# Patient Record
Sex: FEMALE | Race: WHITE | NOT HISPANIC OR LATINO
[De-identification: names, ages, dates, MRNs, and addresses within clinical notes are randomized per-mention and may not be internally consistent; named-entity substitution may affect disease eponyms.]

---

## 2020-12-13 ENCOUNTER — TRANSCRIPTION ENCOUNTER (OUTPATIENT)
Age: 65
End: 2020-12-13

## 2020-12-14 ENCOUNTER — INPATIENT (INPATIENT)
Facility: HOSPITAL | Age: 65
LOS: 1 days | Discharge: HOME | End: 2020-12-16
Attending: FAMILY MEDICINE | Admitting: FAMILY MEDICINE
Payer: MEDICARE

## 2020-12-14 VITALS
RESPIRATION RATE: 17 BRPM | HEART RATE: 105 BPM | HEIGHT: 60 IN | OXYGEN SATURATION: 98 % | TEMPERATURE: 98 F | DIASTOLIC BLOOD PRESSURE: 76 MMHG | SYSTOLIC BLOOD PRESSURE: 137 MMHG | WEIGHT: 199.96 LBS

## 2020-12-14 DIAGNOSIS — S62.609B FRACTURE OF UNSPECIFIED PHALANX OF UNSPECIFIED FINGER, INITIAL ENCOUNTER FOR OPEN FRACTURE: ICD-10-CM

## 2020-12-14 DIAGNOSIS — Z98.89 OTHER SPECIFIED POSTPROCEDURAL STATES: Chronic | ICD-10-CM

## 2020-12-14 DIAGNOSIS — H26.9 UNSPECIFIED CATARACT: Chronic | ICD-10-CM
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
## 2020-12-14 DIAGNOSIS — E78.5 HYPERLIPIDEMIA, UNSPECIFIED: ICD-10-CM

## 2020-12-14 DIAGNOSIS — E03.9 HYPOTHYROIDISM, UNSPECIFIED: ICD-10-CM

## 2020-12-14 LAB
ALBUMIN SERPL ELPH-MCNC: 3.4 G/DL — SIGNIFICANT CHANGE UP (ref 3.3–5)
ALP SERPL-CCNC: 290 U/L — HIGH (ref 40–120)
ALT FLD-CCNC: 101 U/L — HIGH (ref 12–78)
ANION GAP SERPL CALC-SCNC: 8 MMOL/L — SIGNIFICANT CHANGE UP (ref 5–17)
APTT BLD: 29.9 SEC — SIGNIFICANT CHANGE UP (ref 27.5–35.5)
AST SERPL-CCNC: 26 U/L — SIGNIFICANT CHANGE UP (ref 15–37)
BASOPHILS # BLD AUTO: 0.05 K/UL — SIGNIFICANT CHANGE UP (ref 0–0.2)
BASOPHILS NFR BLD AUTO: 0.4 % — SIGNIFICANT CHANGE UP (ref 0–2)
BILIRUB SERPL-MCNC: 0.3 MG/DL — SIGNIFICANT CHANGE UP (ref 0.2–1.2)
BUN SERPL-MCNC: 23 MG/DL — SIGNIFICANT CHANGE UP (ref 7–23)
CALCIUM SERPL-MCNC: 9.3 MG/DL — SIGNIFICANT CHANGE UP (ref 8.5–10.1)
CHLORIDE SERPL-SCNC: 107 MMOL/L — SIGNIFICANT CHANGE UP (ref 96–108)
CO2 SERPL-SCNC: 26 MMOL/L — SIGNIFICANT CHANGE UP (ref 22–31)
CREAT SERPL-MCNC: 1.01 MG/DL — SIGNIFICANT CHANGE UP (ref 0.5–1.3)
EOSINOPHIL # BLD AUTO: 0.23 K/UL — SIGNIFICANT CHANGE UP (ref 0–0.5)
EOSINOPHIL NFR BLD AUTO: 2 % — SIGNIFICANT CHANGE UP (ref 0–6)
FLUAV AG NPH QL: SIGNIFICANT CHANGE UP
FLUBV AG NPH QL: SIGNIFICANT CHANGE UP
GLUCOSE SERPL-MCNC: 144 MG/DL — HIGH (ref 70–99)
HCT VFR BLD CALC: 39.4 % — SIGNIFICANT CHANGE UP (ref 34.5–45)
HGB BLD-MCNC: 12.2 G/DL — SIGNIFICANT CHANGE UP (ref 11.5–15.5)
IMM GRANULOCYTES NFR BLD AUTO: 0.4 % — SIGNIFICANT CHANGE UP (ref 0–1.5)
INR BLD: 0.97 RATIO — SIGNIFICANT CHANGE UP (ref 0.88–1.16)
LYMPHOCYTES # BLD AUTO: 18 % — SIGNIFICANT CHANGE UP (ref 13–44)
LYMPHOCYTES # BLD AUTO: 2.05 K/UL — SIGNIFICANT CHANGE UP (ref 1–3.3)
MCHC RBC-ENTMCNC: 28.4 PG — SIGNIFICANT CHANGE UP (ref 27–34)
MCHC RBC-ENTMCNC: 31 GM/DL — LOW (ref 32–36)
MCV RBC AUTO: 91.8 FL — SIGNIFICANT CHANGE UP (ref 80–100)
MONOCYTES # BLD AUTO: 0.81 K/UL — SIGNIFICANT CHANGE UP (ref 0–0.9)
MONOCYTES NFR BLD AUTO: 7.1 % — SIGNIFICANT CHANGE UP (ref 2–14)
NEUTROPHILS # BLD AUTO: 8.23 K/UL — HIGH (ref 1.8–7.4)
NEUTROPHILS NFR BLD AUTO: 72.1 % — SIGNIFICANT CHANGE UP (ref 43–77)
NRBC # BLD: 0 /100 WBCS — SIGNIFICANT CHANGE UP (ref 0–0)
PLATELET # BLD AUTO: 225 K/UL — SIGNIFICANT CHANGE UP (ref 150–400)
POTASSIUM SERPL-MCNC: 4.2 MMOL/L — SIGNIFICANT CHANGE UP (ref 3.5–5.3)
POTASSIUM SERPL-SCNC: 4.2 MMOL/L — SIGNIFICANT CHANGE UP (ref 3.5–5.3)
PROT SERPL-MCNC: 7.1 GM/DL — SIGNIFICANT CHANGE UP (ref 6–8.3)
PROTHROM AB SERPL-ACNC: 11.3 SEC — SIGNIFICANT CHANGE UP (ref 10.6–13.6)
RBC # BLD: 4.29 M/UL — SIGNIFICANT CHANGE UP (ref 3.8–5.2)
RBC # FLD: 14.5 % — SIGNIFICANT CHANGE UP (ref 10.3–14.5)
RSV RNA NPH QL NAA+NON-PROBE: SIGNIFICANT CHANGE UP
SARS-COV-2 RNA SPEC QL NAA+PROBE: SIGNIFICANT CHANGE UP
SODIUM SERPL-SCNC: 141 MMOL/L — SIGNIFICANT CHANGE UP (ref 135–145)
WBC # BLD: 11.41 K/UL — HIGH (ref 3.8–10.5)
WBC # FLD AUTO: 11.41 K/UL — HIGH (ref 3.8–10.5)

## 2020-12-14 PROCEDURE — 99223 1ST HOSP IP/OBS HIGH 75: CPT

## 2020-12-14 PROCEDURE — 99285 EMERGENCY DEPT VISIT HI MDM: CPT

## 2020-12-14 PROCEDURE — 99223 1ST HOSP IP/OBS HIGH 75: CPT | Mod: AI

## 2020-12-14 PROCEDURE — 73140 X-RAY EXAM OF FINGER(S): CPT | Mod: 26,RT

## 2020-12-14 RX ORDER — TRAMADOL HYDROCHLORIDE 50 MG/1
50 TABLET ORAL EVERY 6 HOURS
Refills: 0 | Status: DISCONTINUED | OUTPATIENT
Start: 2020-12-14 | End: 2020-12-16

## 2020-12-14 RX ORDER — TETANUS TOXOID, REDUCED DIPHTHERIA TOXOID AND ACELLULAR PERTUSSIS VACCINE, ADSORBED 5; 2.5; 8; 8; 2.5 [IU]/.5ML; [IU]/.5ML; UG/.5ML; UG/.5ML; UG/.5ML
0.5 SUSPENSION INTRAMUSCULAR ONCE
Refills: 0 | Status: COMPLETED | OUTPATIENT
Start: 2020-12-14 | End: 2020-12-14

## 2020-12-14 RX ORDER — OXYCODONE AND ACETAMINOPHEN 5; 325 MG/1; MG/1
1 TABLET ORAL ONCE
Refills: 0 | Status: DISCONTINUED | OUTPATIENT
Start: 2020-12-14 | End: 2020-12-14

## 2020-12-14 RX ORDER — CHOLECALCIFEROL (VITAMIN D3) 125 MCG
1000 CAPSULE ORAL DAILY
Refills: 0 | Status: DISCONTINUED | OUTPATIENT
Start: 2020-12-14 | End: 2020-12-16

## 2020-12-14 RX ORDER — ACETAMINOPHEN 500 MG
650 TABLET ORAL EVERY 6 HOURS
Refills: 0 | Status: DISCONTINUED | OUTPATIENT
Start: 2020-12-14 | End: 2020-12-16

## 2020-12-14 RX ORDER — POLYETHYLENE GLYCOL 3350 17 G/17G
17 POWDER, FOR SOLUTION ORAL DAILY
Refills: 0 | Status: DISCONTINUED | OUTPATIENT
Start: 2020-12-14 | End: 2020-12-16

## 2020-12-14 RX ORDER — ALBUTEROL 90 UG/1
2 AEROSOL, METERED ORAL EVERY 6 HOURS
Refills: 0 | Status: DISCONTINUED | OUTPATIENT
Start: 2020-12-14 | End: 2020-12-16

## 2020-12-14 RX ORDER — BUDESONIDE AND FORMOTEROL FUMARATE DIHYDRATE 160; 4.5 UG/1; UG/1
2 AEROSOL RESPIRATORY (INHALATION)
Refills: 0 | Status: DISCONTINUED | OUTPATIENT
Start: 2020-12-14 | End: 2020-12-16

## 2020-12-14 RX ORDER — ASPIRIN/CALCIUM CARB/MAGNESIUM 324 MG
81 TABLET ORAL DAILY
Refills: 0 | Status: DISCONTINUED | OUTPATIENT
Start: 2020-12-14 | End: 2020-12-16

## 2020-12-14 RX ORDER — PANTOPRAZOLE SODIUM 20 MG/1
40 TABLET, DELAYED RELEASE ORAL
Refills: 0 | Status: DISCONTINUED | OUTPATIENT
Start: 2020-12-14 | End: 2020-12-16

## 2020-12-14 RX ORDER — LEVOTHYROXINE SODIUM 125 MCG
175 TABLET ORAL DAILY
Refills: 0 | Status: DISCONTINUED | OUTPATIENT
Start: 2020-12-14 | End: 2020-12-16

## 2020-12-14 RX ORDER — AMPICILLIN SODIUM AND SULBACTAM SODIUM 250; 125 MG/ML; MG/ML
3 INJECTION, POWDER, FOR SUSPENSION INTRAMUSCULAR; INTRAVENOUS ONCE
Refills: 0 | Status: COMPLETED | OUTPATIENT
Start: 2020-12-14 | End: 2020-12-14

## 2020-12-14 RX ORDER — MULTIVIT-MIN/FERROUS GLUCONATE 9 MG/15 ML
1 LIQUID (ML) ORAL DAILY
Refills: 0 | Status: DISCONTINUED | OUTPATIENT
Start: 2020-12-14 | End: 2020-12-16

## 2020-12-14 RX ORDER — AMPICILLIN SODIUM AND SULBACTAM SODIUM 250; 125 MG/ML; MG/ML
3 INJECTION, POWDER, FOR SUSPENSION INTRAMUSCULAR; INTRAVENOUS EVERY 6 HOURS
Refills: 0 | Status: DISCONTINUED | OUTPATIENT
Start: 2020-12-14 | End: 2020-12-16

## 2020-12-14 RX ORDER — VANCOMYCIN HCL 1 G
1000 VIAL (EA) INTRAVENOUS ONCE
Refills: 0 | Status: COMPLETED | OUTPATIENT
Start: 2020-12-14 | End: 2020-12-14

## 2020-12-14 RX ORDER — SIMVASTATIN 20 MG/1
20 TABLET, FILM COATED ORAL AT BEDTIME
Refills: 0 | Status: DISCONTINUED | OUTPATIENT
Start: 2020-12-14 | End: 2020-12-16

## 2020-12-14 RX ADMIN — TETANUS TOXOID, REDUCED DIPHTHERIA TOXOID AND ACELLULAR PERTUSSIS VACCINE, ADSORBED 0.5 MILLILITER(S): 5; 2.5; 8; 8; 2.5 SUSPENSION INTRAMUSCULAR at 10:14

## 2020-12-14 RX ADMIN — AMPICILLIN SODIUM AND SULBACTAM SODIUM 200 GRAM(S): 250; 125 INJECTION, POWDER, FOR SUSPENSION INTRAMUSCULAR; INTRAVENOUS at 12:00

## 2020-12-14 RX ADMIN — OXYCODONE AND ACETAMINOPHEN 1 TABLET(S): 5; 325 TABLET ORAL at 10:21

## 2020-12-14 RX ADMIN — OXYCODONE AND ACETAMINOPHEN 1 TABLET(S): 5; 325 TABLET ORAL at 09:23

## 2020-12-14 RX ADMIN — Medication 250 MILLIGRAM(S): at 10:56

## 2020-12-14 RX ADMIN — TRAMADOL HYDROCHLORIDE 50 MILLIGRAM(S): 50 TABLET ORAL at 22:16

## 2020-12-14 RX ADMIN — AMPICILLIN SODIUM AND SULBACTAM SODIUM 3 GRAM(S): 250; 125 INJECTION, POWDER, FOR SUSPENSION INTRAMUSCULAR; INTRAVENOUS at 10:51

## 2020-12-14 RX ADMIN — SIMVASTATIN 20 MILLIGRAM(S): 20 TABLET, FILM COATED ORAL at 22:16

## 2020-12-14 RX ADMIN — TRAMADOL HYDROCHLORIDE 50 MILLIGRAM(S): 50 TABLET ORAL at 15:30

## 2020-12-14 RX ADMIN — Medication 1000 MILLIGRAM(S): at 11:56

## 2020-12-14 RX ADMIN — AMPICILLIN SODIUM AND SULBACTAM SODIUM 200 GRAM(S): 250; 125 INJECTION, POWDER, FOR SUSPENSION INTRAMUSCULAR; INTRAVENOUS at 10:21

## 2020-12-14 RX ADMIN — AMPICILLIN SODIUM AND SULBACTAM SODIUM 200 GRAM(S): 250; 125 INJECTION, POWDER, FOR SUSPENSION INTRAMUSCULAR; INTRAVENOUS at 23:14

## 2020-12-14 RX ADMIN — TRAMADOL HYDROCHLORIDE 50 MILLIGRAM(S): 50 TABLET ORAL at 22:58

## 2020-12-14 RX ADMIN — AMPICILLIN SODIUM AND SULBACTAM SODIUM 200 GRAM(S): 250; 125 INJECTION, POWDER, FOR SUSPENSION INTRAMUSCULAR; INTRAVENOUS at 17:59

## 2020-12-14 NOTE — ED ADULT TRIAGE NOTE - CHIEF COMPLAINT QUOTE
patient c/o her pitbull dog biting off her right 2 nd finger, bleeding moderately with partial amputation.

## 2020-12-14 NOTE — ED PROVIDER NOTE - PHYSICAL EXAMINATION
right hand: wound to distal 2nd digit, partial amputation just distal to DIP; sensation intact to distal tip  mild bleeding  no injury proximal to DIP  FROM proximal finger but unable to range at DIP

## 2020-12-14 NOTE — ED PROVIDER NOTE - PROGRESS NOTE DETAILS
d/w Dr Rollins who requests unasyn, vanco, and he will come to evaluate patient  -md tiburcio Dr Jones repairing wound; requests admission for IV abx  d/w Dr Lawson who will admit

## 2020-12-14 NOTE — ED PROVIDER NOTE - CARE PLAN
Principal Discharge DX:	Dog bite  Secondary Diagnosis:	Open finger fracture   Principal Discharge DX:	Dog bite  Secondary Diagnosis:	Finger fracture, right   Principal Discharge DX:	Dog bite  Assessment and plan of treatment:	open fracture, right distal phalanx  Secondary Diagnosis:	Finger fracture, right   Principal Discharge DX:	Dog bite  Assessment and plan of treatment:	open fracture, right 2nd digt, distal phalanx  Secondary Diagnosis:	Finger fracture, right

## 2020-12-14 NOTE — H&P ADULT - NSHPPHYSICALEXAM_GEN_ALL_CORE
ICU Vital Signs Last 24 Hrs  T(C): 36.5 (14 Dec 2020 11:51), Max: 36.9 (14 Dec 2020 08:57)  T(F): 97.7 (14 Dec 2020 11:51), Max: 98.5 (14 Dec 2020 08:57)  HR: 73 (14 Dec 2020 11:51) (73 - 105)  BP: 145/83 (14 Dec 2020 11:51) (137/76 - 145/83)  BP(mean): --  ABP: --  ABP(mean): --  RR: 18 (14 Dec 2020 11:51) (17 - 18)  SpO2: 99% (14 Dec 2020 11:51) (98% - 99%)  GENERAL: NAD well-developed  HEAD:  Atraumatic, Normocephalic  EYES: EOMI, PERRLA, conjunctiva and sclera clear  ENMT: No tonsillar erythema, exudates, or enlargement; Moist mucous membranes, Good dentition, No lesions  NECK: Supple, No JVD, Normal thyroid  NERVOUS SYSTEM:  Alert & Oriented X3, Good concentration; Motor Strength 5/5 B/L upper and lower extremities; DTRs 2+ intact and symmetric  CHEST/LUNG: Clear to percussion bilaterally; No rales, rhonchi, wheezing, or rubs  HEART: Regular rate and rhythm; No murmurs, rubs, or gallops  ABDOMEN: Soft, Nontender, Nondistended; Bowel sounds present  EXTREMITIES:  2+ Peripheral Pulses, No clubbing, cyanosis, or edema  LYMPH: No lymphadenopathy   SKIN: No rashes or lesions ICU Vital Signs Last 24 Hrs  T(C): 36.5 (14 Dec 2020 11:51), Max: 36.9 (14 Dec 2020 08:57)  T(F): 97.7 (14 Dec 2020 11:51), Max: 98.5 (14 Dec 2020 08:57)  HR: 73 (14 Dec 2020 11:51) (73 - 105)  BP: 145/83 (14 Dec 2020 11:51) (137/76 - 145/83)  BP(mean): --  ABP: --  ABP(mean): --  RR: 18 (14 Dec 2020 11:51) (17 - 18)  SpO2: 99% (14 Dec 2020 11:51) (98% - 99%)  GENERAL: NAD well-developed  HEAD:  Atraumatic, Normocephalic  EYES: EOMI, PERRLA, conjunctiva and sclera clear  ENMT: No tonsillar erythema, exudates, or enlargement; Moist mucous membranes, Good dentition, No lesions  NECK: Supple, No JVD, Normal thyroid  NERVOUS SYSTEM:  Alert & Oriented X3, Good concentration; Motor Strength 5/5 B/L upper and lower extremities; DTRs 2+ intact and symmetric  CHEST/LUNG: Clear to percussion bilaterally; No rales, rhonchi, wheezing, or rubs  HEART: Regular rate and rhythm; No murmurs, rubs, or gallops  ABDOMEN: Soft, Nontender, Nondistended; Bowel sounds present  EXTREMITIES:  2+ Peripheral Pulses, No clubbing, cyanosis, or edema POSITIVE finger dressing to finger   LYMPH: No lymphadenopathy   SKIN: No rashes or lesions

## 2020-12-14 NOTE — ED ADULT NURSE NOTE - OBJECTIVE STATEMENT
Pt with rt 2nd finger partial amputation s/p dog bit finger.  Pt reports she was giving her dog a pill covered in peanut butter when the dog accidently bit her finger along with the pill.  Bleeding is controlled, pt to xr.  MD to call hand surgeon.

## 2020-12-14 NOTE — CONSULT NOTE ADULT - ASSESSMENT
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 The pt presented to ED s/p dog bite, the pt owned the dog for 15 years. The pt was administering a medication to the dog and the dog bit her, dog's vaccines are up to date. The pt received a tetanus shot in ED. On admission, the pt is afebrile, WBC 11.41, Right hand Xray  revealed fracture of 2nd distal phalanx. The pt was seen by the plastic surgeon earlier, the pt tolerated repair of right index finger open fx from dog bite, splinted.  Antimicrobial therapy was initiated, received one time dose of Vancomycin IV, Unasyn IV is in progress.     1. Dog bite right 2nd phalanx  2. 2nd distal phalanx fracture    Plan  - continue Unasyn 3 grams IV q 6 hours  - monitor pt's temperatures  - monitor pt's WBC  - obtain blood cultures if spikes a fever  - pain control  - obtain Hgb A1c     64 years old female, from home, lives alone, retired, used to work in school, with PMH of borderline DM, Hypothyroidism, HLD, recent intentional 40 lbs weight loss, the pt is s/p left knee replacement in July, 2020, then right knee replacement one month ago, s/p shingles - treated, a couple weeks ago. The pt presented to ED s/p dog bite, the pt owned the dog for 15 years. The pt was administering a medication to the dog and the dog bit her, dog's vaccines are up to date. The pt received a tetanus shot in ED. On admission, the pt is afebrile, WBC 11.41, Right hand Xray  revealed fracture of 2nd distal phalanx (I personally reviewed). The pt was seen by the plastic surgeon earlier, the pt tolerated repair of right index finger open fx from dog bite, splinted.  Antimicrobial therapy was initiated, received one time dose of Vancomycin IV, Unasyn IV is in progress.     1. Dog bite right 2nd phalanx  2. 2nd distal phalanx fracture    Plan  - continue Unasyn 3 grams IV q 6 hours  - monitor pt's temperatures  - monitor pt's WBC  - obtain blood cultures if spikes a fever  - pain control  - obtain Hgb A1c  - received Tdap vaccine in ED

## 2020-12-14 NOTE — CONSULT NOTE ADULT - SUBJECTIVE AND OBJECTIVE BOX
See dictated note  tolerated repair of right index finger open fx from dog bite, splinted.  Rec: Admit/iv abx, eg Unasyn+Vanco/ID consult to determine the extent and route of abx         Discharge home on abx as per ID when cleared for discharge         Follow up in my office as out-pt next wk.  Prognosis: Guarded.  Cell [phone]

## 2020-12-14 NOTE — H&P ADULT - NSHPREVIEWOFSYSTEMS_GEN_ALL_CORE

## 2020-12-14 NOTE — CONSULT NOTE ADULT - SUBJECTIVE AND OBJECTIVE BOX
Patient is a 64y old  Female who presents with a chief complaint of     HPI:  63 yo female with hx dm, copd, c/o dog bite to right 2nd digit. Patient was giving her dog (smita) a pill this am, and dog bit finger. Denies other injury. dogs vaccines are utd. 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 The pt presented to ED s/p dog bite, the pt owned the dog for 15 years. The pt was administering a medication to the dog and the dog bit her, dog's vaccines are up to date. The pt received a tetanus shot in ED. On admission, the pt is afebrile, WBC 11.41, Right hand Xray  revealed fracture of 2nd distal phalanx. The pt was seen by the plastic surgeon earlier, the pt tolerated repair of right index finger open fx from dog bite, splinted.  Antimicrobial therapy was initiated, received one time dose of Vancomycin IV, Unasyn IV is in progress.   ID consulted for workup and antibiotic management     PAST MEDICAL & SURGICAL HISTORY:  Asthma    Dyslipidemia    Insomnia    Hypothyroid  history of recent shingles, treated     Cataract  RIGHT  EYE 2010    S/P lumbar laminectomy    S/P  section    Bilateral knees replacement, right 2020, left 2020        Allergies  No Known Allergies        ANTIMICROBIALS:  ampicillin/sulbactam  IVPB 3 every 6 hours      MEDICATIONS  (STANDING):  ampicillin/sulbactam  IVPB   200 mL/Hr IV Intermittent (20 @ 12:00)    ampicillin/sulbactam  IVPB   200 mL/Hr IV Intermittent (20 @ 10:21)    vancomycin  IVPB   250 mL/Hr IV Intermittent (20 @ 10:56)        OTHER MEDS: MEDICATIONS  (STANDING):  acetaminophen   Tablet .. 650 every 6 hours PRN  ALBUTerol    90 MICROgram(s) HFA Inhaler 2 every 6 hours PRN  aspirin enteric coated 81 daily  budesonide 160 MICROgram(s)/formoterol 4.5 MICROgram(s) Inhaler 2 two times a day  levothyroxine 175 daily  pantoprazole    Tablet 40 before breakfast  polyethylene glycol 3350 17 daily  pregabalin 100 two times a day  simvastatin 20 at bedtime  traMADol 50 every 6 hours PRN      SOCIAL HISTORY:     Non- smoker  denies ETOH abuse  denies drug abuse    FAMILY HISTORY:  Father - emphysema, used to be a smoker,  at age 82  Mother -  of old age, 86 yo        REVIEW OF SYSTEMS  [  ] ROS unobtainable because:    [  ] All other systems negative except as noted below:	    Constitutional:  [ ] fever [ ] chills  [ x] weight loss - intentional  [ ] weakness  Skin:  [ ] rash [ ] phlebitis	  Eyes: [ ] icterus [ ] pain  [ ] discharge	  ENMT: [ ] sore throat  [ ] thrush [ ] ulcers [ ] exudates  Respiratory: [ ] dyspnea [ ] hemoptysis [ ] cough [ ] sputum	  Cardiovascular:  [ ] chest pain [ ] palpitations [ ] edema	  Gastrointestinal:  [ ] nausea [ ] vomiting [ ] diarrhea [ ] constipation [ ] pain	  Genitourinary:  [ ] dysuria [ ] frequency [ ] hematuria [ ] discharge [ ] flank pain  [ ] incontinence  Musculoskeletal:  [ ] myalgias [ ] arthralgias [ ] arthritis  [ ] back pain  Neurological:  [ ] headache [ ] seizures  [ ] confusion/altered mental status  Psychiatric:  [ ] anxiety [ ] depression	  Hematology/Lymphatics:  [ ] lymphadenopathy  Endocrine:  [ ] adrenal [ ] thyroid  Allergic/Immunologic:	 [ ] transplant [ ] seasonal    Vital Signs Last 24 Hrs  T(F): 97.7 (20 @ 11:51), Max: 98.5 (20 @ 08:57)    Vital Signs Last 24 Hrs  HR: 73 (20 @ 11:51) (73 - 105)  BP: 145/83 (20 @ 11:51) (137/76 - 145/83)  RR: 18 (20 @ 11:51)  SpO2: 99% (20 @ 11:51) (98% - 99%)  Wt(kg): --    PHYSICAL EXAM:  Constitutional: non-toxic, no distress, overweight   HEAD/EYES: anicteric, no conjunctival injection  ENT:  supple, no thrush  Cardiovascular:   normal S1, S2, faint audible murmur, no edema  Respiratory:  clear BS bilaterally, no wheezes, no rales  GI:  soft, non-tender, normal bowel sounds  :  no baez, no CVA tenderness  Musculoskeletal:  no synovitis, right hand s/p dog bite wrapped - tender at 2nd phalanx, the rest of the fingers - no ROM impairment   Neurologic: awake and alert, normal strength, no focal findings  Skin:  no rash, no erythema, no phlebitis  Heme/Onc: no lymphadenopathy   Psychiatric:  awake, alert, appropriate mood          WBC Count: 11.41 K/uL ( @ 09:56)                            12.2   11.41 )-----------( 225      ( 14 Dec 2020 09:56 )             39.4           141  |  107  |  23  ----------------------------<  144<H>  4.2   |  26  |  1.01    Ca    9.3      14 Dec 2020 09:56    TPro  7.1  /  Alb  3.4  /  TBili  0.3  /  DBili  x   /  AST  26  /  ALT  101<H>  /  AlkPhos  290<H>        Creatinine Trend: 1.01<--        MICROBIOLOGY:      v            RADIOLOGY:  < from: Xray Finger, Right Hand (20 @ 09:59) >    EXAM:  FINGER(S) RT                            PROCEDURE DATE:  2020          INTERPRETATION:  CLINICAL STATEMENT: Pain.    TECHNIQUE: AP, oblique and lateral views of right third digit    COMPARISON: None.    FINDINGS:  There is displaced fracture at the tip of second distal phalanx. Adjacent soft tissue laceration. No dislocation. Degenerative changes noted.    IMPRESSION:  Fracture second distal phalanx            MISSY MORAN MD; Attending Radiologist  This document has been electronically signed. Dec 14 2020 10:28AM    < end of copied text >                 Patient is a 64y old  Female who presents with a chief complaint of     HPI:  65 yo female with hx dm, copd, c/o dog bite to right 2nd digit. Patient was giving her dog (smita) a pill this am, and dog bit finger. Denies other injury. dogs vaccines are utd. 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 The pt presented to ED s/p dog bite, the pt owned the dog for 15 years. The pt was administering a medication to the dog and the dog bit her, dog's vaccines are up to date. The pt received a tetanus shot in ED. On admission, the pt is afebrile, WBC 11.41, Right hand Xray  revealed fracture of 2nd distal phalanx. The pt was seen by the plastic surgeon earlier, the pt tolerated repair of right index finger open fx from dog bite, splinted.  Antimicrobial therapy was initiated, received one time dose of Vancomycin IV, Unasyn IV is in progress.   ID consulted for workup and antibiotic management     PAST MEDICAL & SURGICAL HISTORY:  Asthma    Dyslipidemia    Insomnia    Hypothyroid  history of recent shingles, treated     Cataract  RIGHT  EYE 2010    S/P lumbar laminectomy    S/P  section    Bilateral knees replacement, right 2020, left 2020        Allergies  No Known Allergies        ANTIMICROBIALS:  ampicillin/sulbactam  IVPB 3 every 6 hours      MEDICATIONS  (STANDING):  ampicillin/sulbactam  IVPB   200 mL/Hr IV Intermittent (20 @ 12:00)    ampicillin/sulbactam  IVPB   200 mL/Hr IV Intermittent (20 @ 10:21)    vancomycin  IVPB   250 mL/Hr IV Intermittent (20 @ 10:56)        OTHER MEDS: MEDICATIONS  (STANDING):  acetaminophen   Tablet .. 650 every 6 hours PRN  ALBUTerol    90 MICROgram(s) HFA Inhaler 2 every 6 hours PRN  aspirin enteric coated 81 daily  budesonide 160 MICROgram(s)/formoterol 4.5 MICROgram(s) Inhaler 2 two times a day  levothyroxine 175 daily  pantoprazole    Tablet 40 before breakfast  polyethylene glycol 3350 17 daily  pregabalin 100 two times a day  simvastatin 20 at bedtime  traMADol 50 every 6 hours PRN      SOCIAL HISTORY:     Non- smoker  denies ETOH abuse  denies drug abuse    FAMILY HISTORY:  Father - emphysema, used to be a smoker,  at age 82  Mother -  of old age, 88 yo        REVIEW OF SYSTEMS  [  ] ROS unobtainable because:    [  ] All other systems negative except as noted below:	    Constitutional:  [ ] fever [ ] chills  [ x] weight loss - intentional  [ ] weakness  Skin:  [ ] rash [ ] phlebitis	  Eyes: [ ] icterus [ ] pain  [ ] discharge	  ENMT: [ ] sore throat  [ ] thrush [ ] ulcers [ ] exudates  Respiratory: [ ] dyspnea [ ] hemoptysis [ ] cough [ ] sputum	  Cardiovascular:  [ ] chest pain [ ] palpitations [ ] edema	  Gastrointestinal:  [ ] nausea [ ] vomiting [ ] diarrhea [ ] constipation [ ] pain	  Genitourinary:  [ ] dysuria [ ] frequency [ ] hematuria [ ] discharge [ ] flank pain  [ ] incontinence  Musculoskeletal:  [ ] myalgias [ ] arthralgias [ ] arthritis  [ ] back pain  Neurological:  [ ] headache [ ] seizures  [ ] confusion/altered mental status  Psychiatric:  [ ] anxiety [ ] depression	  Hematology/Lymphatics:  [ ] lymphadenopathy  Endocrine:  [ ] adrenal [ ] thyroid  Allergic/Immunologic:	 [ ] transplant [ ] seasonal    Vital Signs Last 24 Hrs  T(F): 97.7 (20 @ 11:51), Max: 98.5 (20 @ 08:57)    Vital Signs Last 24 Hrs  HR: 73 (20 @ 11:51) (73 - 105)  BP: 145/83 (20 @ 11:51) (137/76 - 145/83)  RR: 18 (20 @ 11:51)  SpO2: 99% (20 @ 11:51) (98% - 99%)  Wt(kg): --    PHYSICAL EXAM:  Constitutional: non-toxic, no distress, overweight   HEAD/EYES: anicteric, no conjunctival injection  ENT:  supple, no thrush  Cardiovascular:   normal S1, S2, faint audible murmur, + edema of bilateral lower extremities   Respiratory:  clear BS bilaterally, no wheezes, no rales  GI:  soft, non-tender, normal bowel sounds  :  no baez, no CVA tenderness  Musculoskeletal:  no synovitis, right hand s/p dog bite wrapped - tender at 2nd phalanx, the rest of the fingers - no ROM impairment   Neurologic: awake and alert, normal strength, no focal findings  Skin:  no rash, no erythema, no phlebitis, + noted marks on left chest, shoulder and back post shingles - healed   Heme/Onc: no lymphadenopathy   Psychiatric:  awake, alert, appropriate mood          WBC Count: 11.41 K/uL ( @ 09:56)                            12.2   11.41 )-----------( 225      ( 14 Dec 2020 09:56 )             39.4           141  |  107  |  23  ----------------------------<  144<H>  4.2   |  26  |  1.01    Ca    9.3      14 Dec 2020 09:56    TPro  7.1  /  Alb  3.4  /  TBili  0.3  /  DBili  x   /  AST  26  /  ALT  101<H>  /  AlkPhos  290<H>        Creatinine Trend: 1.01<--        MICROBIOLOGY:      v            RADIOLOGY:  < from: Xray Finger, Right Hand (.20 @ 09:59) >    EXAM:  FINGER(S) RT                            PROCEDURE DATE:  2020          INTERPRETATION:  CLINICAL STATEMENT: Pain.    TECHNIQUE: AP, oblique and lateral views of right third digit    COMPARISON: None.    FINDINGS:  There is displaced fracture at the tip of second distal phalanx. Adjacent soft tissue laceration. No dislocation. Degenerative changes noted.    IMPRESSION:  Fracture second distal phalanx            MISSY MORAN MD; Attending Radiologist  This document has been electronically signed. Dec 14 2020 10:28AM    < end of copied text >                 Patient is a 64y old  Female who presents with a chief complaint of     HPI:  63 yo female with hx dm, copd, c/o dog bite to right 2nd digit. Patient was giving her dog (smita) a pill this am, and dog bit finger. Denies other injury. dogs vaccines are utd. 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 The pt presented to ED s/p dog bite, the pt owned the dog for 15 years. The pt was administering a medication to the dog and the dog bit her, dog's vaccines are up to date. The pt received a tetanus shot in ED. On admission, the pt is afebrile, WBC 11.41, Right hand Xray  revealed fracture of 2nd distal phalanx. The pt was seen by the plastic surgeon earlier, the pt tolerated repair of right index finger open fx from dog bite, splinted.  Antimicrobial therapy was initiated, received one time dose of Vancomycin IV, Unasyn IV is in progress.   ID consulted for workup and antibiotic management     PAST MEDICAL & SURGICAL HISTORY:  Asthma    Dyslipidemia    Insomnia    Hypothyroid  history of recent shingles, treated     Cataract  RIGHT  EYE 2010    S/P lumbar laminectomy    S/P  section    Bilateral knees replacement, right 2020, left 2020        Allergies  No Known Allergies        ANTIMICROBIALS:  ampicillin/sulbactam  IVPB 3 every 6 hours      MEDICATIONS  (STANDING):  ampicillin/sulbactam  IVPB   200 mL/Hr IV Intermittent (20 @ 12:00)    ampicillin/sulbactam  IVPB   200 mL/Hr IV Intermittent (20 @ 10:21)    vancomycin  IVPB   250 mL/Hr IV Intermittent (20 @ 10:56)        OTHER MEDS: MEDICATIONS  (STANDING):  acetaminophen   Tablet .. 650 every 6 hours PRN  ALBUTerol    90 MICROgram(s) HFA Inhaler 2 every 6 hours PRN  aspirin enteric coated 81 daily  budesonide 160 MICROgram(s)/formoterol 4.5 MICROgram(s) Inhaler 2 two times a day  levothyroxine 175 daily  pantoprazole    Tablet 40 before breakfast  polyethylene glycol 3350 17 daily  pregabalin 100 two times a day  simvastatin 20 at bedtime  traMADol 50 every 6 hours PRN      SOCIAL HISTORY:     Non- smoker  denies ETOH abuse  denies drug abuse    FAMILY HISTORY:  Father - emphysema, used to be a smoker,  at age 82  Mother -  of old age, 88 yo        REVIEW OF SYSTEMS  [  ] ROS unobtainable because:    [X  ] All other systems negative except as noted below:	    Constitutional:  [ ] fever [ ] chills  [ x] weight loss - intentional  [ ] weakness  Skin:  [ ] rash [ ] phlebitis	  Eyes: [ ] icterus [ ] pain  [ ] discharge	  ENMT: [ ] sore throat  [ ] thrush [ ] ulcers [ ] exudates  Respiratory: [ ] dyspnea [ ] hemoptysis [ ] cough [ ] sputum	  Cardiovascular:  [ ] chest pain [ ] palpitations [ ] edema	  Gastrointestinal:  [ ] nausea [ ] vomiting [ ] diarrhea [ ] constipation [ ] pain	  Genitourinary:  [ ] dysuria [ ] frequency [ ] hematuria [ ] discharge [ ] flank pain  [ ] incontinence  Musculoskeletal:  [ ] myalgias [ ] arthralgias [ ] arthritis  [ ] back pain  Neurological:  [ ] headache [ ] seizures  [ ] confusion/altered mental status  Psychiatric:  [ ] anxiety [ ] depression	  Hematology/Lymphatics:  [ ] lymphadenopathy  Endocrine:  [ ] adrenal [ ] thyroid  Allergic/Immunologic:	 [ ] transplant [ ] seasonal    Vital Signs Last 24 Hrs  T(F): 97.7 (20 @ 11:51), Max: 98.5 (20 @ 08:57)    Vital Signs Last 24 Hrs  HR: 73 (20 @ 11:51) (73 - 105)  BP: 145/83 (20 @ 11:51) (137/76 - 145/83)  RR: 18 (20 @ 11:51)  SpO2: 99% (20 @ 11:51) (98% - 99%)  Wt(kg): --    PHYSICAL EXAM:  Constitutional: non-toxic, no distress, overweight   HEAD/EYES: anicteric, no conjunctival injection  ENT:  supple, no thrush  Cardiovascular:   normal S1, S2, faint audible murmur, + edema of bilateral lower extremities   Respiratory:  clear BS bilaterally, no wheezes, no rales  GI:  soft, non-tender, normal bowel sounds  :  no baez, no CVA tenderness  Musculoskeletal:  no synovitis, right hand s/p dog bite wrapped - tender at 2nd phalanx, the rest of the fingers - no ROM impairment   Neurologic: awake and alert, normal strength, no focal findings  Skin:  no erythema, no phlebitis, + noted rash which is fully crusted on left chest, shoulder and back post shingles - healing   Heme/Onc: no lymphadenopathy   Psychiatric:  awake, alert, appropriate mood          WBC Count: 11.41 K/uL ( @ 09:56)                            12.2   11.41 )-----------( 225      ( 14 Dec 2020 09:56 )             39.4           141  |  107  |  23  ----------------------------<  144<H>  4.2   |  26  |  1.01    Ca    9.3      14 Dec 2020 09:56    TPro  7.1  /  Alb  3.4  /  TBili  0.3  /  DBili  x   /  AST  26  /  ALT  101<H>  /  AlkPhos  290<H>        Creatinine Trend: 1.01<--        MICROBIOLOGY:    RADIOLOGY:  < from: Xray Finger, Right Hand (.20 @ 09:59) >    EXAM:  FINGER(S) RT                            PROCEDURE DATE:  2020          INTERPRETATION:  CLINICAL STATEMENT: Pain.    TECHNIQUE: AP, oblique and lateral views of right third digit    COMPARISON: None.    FINDINGS:  There is displaced fracture at the tip of second distal phalanx. Adjacent soft tissue laceration. No dislocation. Degenerative changes noted.    IMPRESSION:  Fracture second distal phalanx            MISSY MORAN MD; Attending Radiologist  This document has been electronically signed. Dec 14 2020 10:28AM    < end of copied text >

## 2020-12-14 NOTE — H&P ADULT - HISTORY OF PRESENT ILLNESS
63 yo female with hx dm, copd, c/o dog bite to right 2nd digit. Patient was giving her dog (pitbull) a pill this am, and dog bit finger. Denies other injury. dogs vaccines are utd.   	unknown last tetanus   63 yo female with hx dm, copd, c/o dog bite to right 2nd digit. Patient was giving her dog (pitbull) a pill this am, and dog bit finger. Denies other injury. dogs vaccines are utd. patient denies any other medical problems

## 2020-12-14 NOTE — ED PROVIDER NOTE - CROS ED CARDIOVAS ALL NEG
3/6/2019    Jennifer Sue   [facility] [address]        Dear Jennifer Sue,    This is a reminder that it is time for you to schedule an appointment with Dale Perez M.D. for one of the following:     [] Colonoscopy   [x] EGD (Upper Endoscopy)   [] ERCP   [] Imaging Study   [] Lab Work   [] Office Visit    Please call the office listed below to schedule a date and time that is convenient for you.     Sincerely,    Dale Perez M.D.     Department of Gastroenterology    [address] [id number] [phone]      Dale Perez M.D.  
negative...

## 2020-12-14 NOTE — H&P ADULT - ASSESSMENT
64 years old male with history of diabetes status post dog bite         IMPROVE VTE Individual Risk Assessment          RISK                                                          Points  [  ] Previous VTE                                                3  [  ] Thrombophilia                                             2  [  ] Lower limb paralysis                                   2        (unable to hold up >15 seconds)    [  ] Current Cancer                                             2         (within 6 months)  [  ] Immobilization > 24 hrs                              1  [  ] ICU/CCU stay > 24 hours                             1  [ x ] Age > 60                                                         1    IMPROVE VTE Score: 1

## 2020-12-14 NOTE — CONSULT NOTE ADULT - ATTENDING COMMENTS
Discussed with Siria Gee NP. I have personally seen, examined and participated in the care of this patient. I have reviewed all pertinent clinical information, including history, physical exam, plan and the NP's note and agree. Note has been reviewed and made any necessary modifications.     given the site of the puncture wound it is imperative to give antibiotics with good coverage for mouth ro of a dog   high risk of bone infection, joint infection and/or bacteremia which would be quite dangerous for her given that she has a new knee prosthesis only one month old.  Will continue Unasyn and may need further irrigation by plastics   if spikes a fever please draw 2 sets of blood cultures drawn from 2 different sites   She received the Tdap vaccine today   hand elevation   pain control     Saturnino Padilla DO  Cell: [phone]  Pager [phone]  Infectious Disease Attending  After 5pm/weekends please call [phone] for all inquiries and new consults

## 2020-12-14 NOTE — ED PROVIDER NOTE - OBJECTIVE STATEMENT
63 yo female with hx dm, copd, c/o dog bite to right 2nd digit. Patient was giving her dog (pitbull) a pill this am, and dog bit finger. Denies other injury. dogs vaccines are utd.   unknown last tetanus  no allergies  c/o pain to finger

## 2020-12-15 LAB
ERYTHROCYTE [SEDIMENTATION RATE] IN BLOOD: 30 MM/HR — HIGH (ref 0–20)
HCV AB S/CO SERPL IA: 0.05 S/CO — SIGNIFICANT CHANGE UP (ref 0–0.99)
HCV AB SERPL-IMP: SIGNIFICANT CHANGE UP
SARS-COV-2 IGG SERPL QL IA: NEGATIVE — SIGNIFICANT CHANGE UP
SARS-COV-2 IGM SERPL IA-ACNC: <0.1 INDEX — SIGNIFICANT CHANGE UP

## 2020-12-15 PROCEDURE — 99232 SBSQ HOSP IP/OBS MODERATE 35: CPT

## 2020-12-15 PROCEDURE — 99233 SBSQ HOSP IP/OBS HIGH 50: CPT

## 2020-12-15 RX ADMIN — Medication 175 MICROGRAM(S): at 05:22

## 2020-12-15 RX ADMIN — AMPICILLIN SODIUM AND SULBACTAM SODIUM 200 GRAM(S): 250; 125 INJECTION, POWDER, FOR SUSPENSION INTRAMUSCULAR; INTRAVENOUS at 11:44

## 2020-12-15 RX ADMIN — AMPICILLIN SODIUM AND SULBACTAM SODIUM 200 GRAM(S): 250; 125 INJECTION, POWDER, FOR SUSPENSION INTRAMUSCULAR; INTRAVENOUS at 23:11

## 2020-12-15 RX ADMIN — PANTOPRAZOLE SODIUM 40 MILLIGRAM(S): 20 TABLET, DELAYED RELEASE ORAL at 08:24

## 2020-12-15 RX ADMIN — Medication 650 MILLIGRAM(S): at 14:05

## 2020-12-15 RX ADMIN — Medication 81 MILLIGRAM(S): at 11:42

## 2020-12-15 RX ADMIN — TRAMADOL HYDROCHLORIDE 50 MILLIGRAM(S): 50 TABLET ORAL at 18:32

## 2020-12-15 RX ADMIN — TRAMADOL HYDROCHLORIDE 50 MILLIGRAM(S): 50 TABLET ORAL at 10:05

## 2020-12-15 RX ADMIN — AMPICILLIN SODIUM AND SULBACTAM SODIUM 200 GRAM(S): 250; 125 INJECTION, POWDER, FOR SUSPENSION INTRAMUSCULAR; INTRAVENOUS at 05:21

## 2020-12-15 RX ADMIN — TRAMADOL HYDROCHLORIDE 50 MILLIGRAM(S): 50 TABLET ORAL at 17:32

## 2020-12-15 RX ADMIN — Medication 1 TABLET(S): at 11:42

## 2020-12-15 RX ADMIN — SIMVASTATIN 20 MILLIGRAM(S): 20 TABLET, FILM COATED ORAL at 23:11

## 2020-12-15 RX ADMIN — Medication 650 MILLIGRAM(S): at 13:05

## 2020-12-15 RX ADMIN — Medication 1000 UNIT(S): at 11:42

## 2020-12-15 RX ADMIN — TRAMADOL HYDROCHLORIDE 50 MILLIGRAM(S): 50 TABLET ORAL at 23:33

## 2020-12-15 RX ADMIN — AMPICILLIN SODIUM AND SULBACTAM SODIUM 200 GRAM(S): 250; 125 INJECTION, POWDER, FOR SUSPENSION INTRAMUSCULAR; INTRAVENOUS at 17:32

## 2020-12-15 RX ADMIN — TRAMADOL HYDROCHLORIDE 50 MILLIGRAM(S): 50 TABLET ORAL at 09:04

## 2020-12-15 NOTE — PROGRESS NOTE ADULT - SUBJECTIVE AND OBJECTIVE BOX
Patient is a 64y old  Female who presents with a chief complaint of   HPI:  65 yo female with hx dm, copd, c/o dog bite to right 2nd digit. Patient was giving her dog (pitbull) a pill this am, and dog bit finger. Denies other injury. dogs vaccines are utd. patient denies any other medical problems    	   (14 Dec 2020 12:45)    SUBJECTIVE & OBJECTIVE: Pt seen and examined at bedside.   PHYSICAL EXAM:  ICU Vital Signs Last 24 Hrs  T(C): 36.7 (15 Dec 2020 11:05), Max: 37.2 (14 Dec 2020 20:54)  T(F): 98 (15 Dec 2020 11:05), Max: 98.9 (14 Dec 2020 20:54)  HR: 75 (15 Dec 2020 11:05) (70 - 81)  BP: 130/62 (15 Dec 2020 11:05) (106/55 - 136/80)  BP(mean): --  ABP: --  ABP(mean): --  RR: 18 (15 Dec 2020 11:05) (16 - 18)  SpO2: 97% (15 Dec 2020 11:05) (95% - 97%)  Daily     Daily Weight in k.2 (15 Dec 2020 05:16)I&O's Detail    14 Dec 2020 07:01  -  15 Dec 2020 07:00  --------------------------------------------------------  IN:    Oral Fluid: 50 mL  Total IN: 50 mL    OUT:  Total OUT: 0 mL    Total NET: 50 mL      15 Dec 2020 07:01  -  15 Dec 2020 17:39  --------------------------------------------------------  IN:    Oral Fluid: 720 mL  Total IN: 720 mL    OUT:  Total OUT: 0 mL    Total NET: 720 mL        GENERAL: NAD, well-groomed, well-developed  HEAD:  Atraumatic, Normocephalic  EYES: EOMI, PERRLA, conjunctiva and sclera clear  ENMT: Moist mucous membranes  NECK: Supple, No JVD  NERVOUS SYSTEM:  Alert & Oriented X3, Motor Strength 5/5 B/L upper and lower extremities; DTRs 2+ intact and symmetric  CHEST/LUNG: Clear to auscultation bilaterally; No rales, rhonchi, wheezing, or rubs  HEART: Regular rate and rhythm; No murmurs, rubs, or gallops  ABDOMEN: Soft, Nontender, Nondistended; Bowel sounds present  EXTREMITIES:  2+ Peripheral Pulses, No clubbing, cyanosis, or edema  LABS:                        12.2   11.41 )-----------( 225      ( 14 Dec 2020 09:56 )             39.4   PT/INR - ( 14 Dec 2020 09:56 )   PT: 11.3 sec;   INR: 0.97 ratio         PTT - ( 14 Dec 2020 09:56 )  PTT:29.9 secCAPILLARY BLOOD GLUCOSE        RECENT CULTURES:  RADIOLOGY & ADDITIONAL TESTS:

## 2020-12-15 NOTE — PROGRESS NOTE ADULT - ASSESSMENT
64 years old female with history of diabetes status post dog bite     Problem/Plan - 1:  ·  Problem: Dog bite, initial encounter.  Plan: will need addtional day of abx  - follow up with Dr. Cuevas in 1 week      Problem/Plan - 2:  ·  Problem: Open finger fracture.  Plan: follow up in 1 week with [facility]  Problem/Plan - 3:  ·  Problem: Dyslipidemia.  Plan: continue home meds.     Problem/Plan - 4:  ·  Problem: Acquired hypothyroidism.  Plan: continue home meds.

## 2020-12-15 NOTE — PROGRESS NOTE ADULT - SUBJECTIVE AND OBJECTIVE BOX
DOMITILA SHAHID  MRN-[id number]    Follow Up:  dog bite    Interval History: The pt is in bed, no distress, in a good mood. The pt is afebrile, no new cbc, yesterday's WBC 11.41, complains of mild pain at the bite site. Pt's left arm IV infiltrated, infusion stopped, RN notified.     ROS:    [ ] Unobtainable because:  [ ] All other systems negative    Constitutional: no fever, no chills  Head: no trauma  Eyes: no vision changes, no eye pain  ENT:  no sore throat, no rhinorrhea  Cardiovascular:  no chest pain, no palpitation  Respiratory:  no SOB, no cough  GI:  no abd pain, no vomiting, no diarrhea  urinary: no dysuria, no hematuria, no flank pain  musculoskeletal:  right 2nd finger mild pain   skin:  no rash  neurology:  no headache, no seizure, no change in mental status  psych: no anxiety, no depression         Allergies  No Known Allergies        ANTIMICROBIALS:  ampicillin/sulbactam  IVPB 3 every 6 hours      OTHER MEDS:  acetaminophen   Tablet .. 650 milliGRAM(s) Oral every 6 hours PRN  ALBUTerol    90 MICROgram(s) HFA Inhaler 2 Puff(s) Inhalation every 6 hours PRN  aspirin enteric coated 81 milliGRAM(s) Oral daily  budesonide 160 MICROgram(s)/formoterol 4.5 MICROgram(s) Inhaler 2 Puff(s) Inhalation two times a day  cholecalciferol 1000 Unit(s) Oral daily  levothyroxine 175 MICROGram(s) Oral daily  multivitamin/minerals 1 Tablet(s) Oral daily  pantoprazole    Tablet 40 milliGRAM(s) Oral before breakfast  polyethylene glycol 3350 17 Gram(s) Oral daily  pregabalin 100 milliGRAM(s) Oral two times a day  simvastatin 20 milliGRAM(s) Oral at bedtime  traMADol 50 milliGRAM(s) Oral every 6 hours PRN      Vital Signs Last 24 Hrs  T(C): 36.7 (15 Dec 2020 11:05), Max: 37.2 (14 Dec 2020 20:54)  T(F): 98 (15 Dec 2020 11:05), Max: 98.9 (14 Dec 2020 20:54)  HR: 75 (15 Dec 2020 11:05) (70 - 81)  BP: 130/62 (15 Dec 2020 11:05) (91/58 - 136/80)  BP(mean): --  RR: 18 (15 Dec 2020 11:05) (16 - 19)  SpO2: 97% (15 Dec 2020 11:05) (95% - 98%)    Physical Exam:    Constitutional: non-toxic, no distress, overweight   HEAD/EYES: anicteric, no conjunctival injection  ENT:  supple, no thrush  Cardiovascular:   normal S1, S2, faint audible murmur, 2 + edema of bilateral lower extremities   Respiratory:  clear BS bilaterally, no wheezes, no rales  GI:  soft, non-tender, normal bowel sounds  :  no baez, no CVA tenderness  Musculoskeletal:  no synovitis, right hand s/p dog bite wrapped - tender at 2nd phalanx, the rest of the fingers - no ROM impairment   Neurologic: awake and alert, normal strength, no focal findings  Skin:  no erythema, + noted rash which is fully crusted on left chest, shoulder and back post shingles - healing; left arm IV infiltrated - no phlebitis   Heme/Onc: no lymphadenopathy   Psychiatric:  awake, alert, appropriate mood, affect    WBC Count: 11.41 K/uL (12-14 @ 09:56)                            12.2   11.41 )-----------( 225      ( 14 Dec 2020 09:56 )             39.4       12-14    141  |  107  |  23  ----------------------------<  144<H>  4.2   |  26  |  1.01    Ca    9.3      14 Dec 2020 09:56    TPro  7.1  /  Alb  3.4  /  TBili  0.3  /  DBili  x   /  AST  26  /  ALT  101<H>  /  AlkPhos  290<H>  12-14          Creatinine Trend: 1.01<--      MICROBIOLOGY:  v    Flu With COVID-19 By LYNDSAY (12.14.20 @ 14:46)    SARS-CoV-2 Result: NotDetec    Influenza A Result: NotDetec    Influenza B Result: NotDetec    Resp Syn Virus Result: NotDetec        RADIOLOGY:     DOMITILA SHAHID  MRN-[id number]    Follow Up:  dog bite    Interval History: The pt is in bed, no distress, in a good mood. The pt is afebrile, no new cbc, yesterday's WBC 11.41, complains of mild pain at the bite site. Pt's left arm IV infiltrated, infusion stopped, RN notified.     ROS:    [ ] Unobtainable because:  [ X] All other systems negative    Constitutional: no fever, no chills  Head: no trauma  Eyes: no vision changes, no eye pain  ENT:  no sore throat, no rhinorrhea  Cardiovascular:  no chest pain, no palpitation  Respiratory:  no SOB, no cough  GI:  no abd pain, no vomiting, no diarrhea  urinary: no dysuria, no hematuria, no flank pain  musculoskeletal:  right 2nd finger mild pain   skin:  no rash  neurology:  no headache, no seizure, no change in mental status  psych: no anxiety, no depression         Allergies  No Known Allergies        ANTIMICROBIALS:  ampicillin/sulbactam  IVPB 3 every 6 hours      OTHER MEDS:  acetaminophen   Tablet .. 650 milliGRAM(s) Oral every 6 hours PRN  ALBUTerol    90 MICROgram(s) HFA Inhaler 2 Puff(s) Inhalation every 6 hours PRN  aspirin enteric coated 81 milliGRAM(s) Oral daily  budesonide 160 MICROgram(s)/formoterol 4.5 MICROgram(s) Inhaler 2 Puff(s) Inhalation two times a day  cholecalciferol 1000 Unit(s) Oral daily  levothyroxine 175 MICROGram(s) Oral daily  multivitamin/minerals 1 Tablet(s) Oral daily  pantoprazole    Tablet 40 milliGRAM(s) Oral before breakfast  polyethylene glycol 3350 17 Gram(s) Oral daily  pregabalin 100 milliGRAM(s) Oral two times a day  simvastatin 20 milliGRAM(s) Oral at bedtime  traMADol 50 milliGRAM(s) Oral every 6 hours PRN      Vital Signs Last 24 Hrs  T(C): 36.7 (15 Dec 2020 11:05), Max: 37.2 (14 Dec 2020 20:54)  T(F): 98 (15 Dec 2020 11:05), Max: 98.9 (14 Dec 2020 20:54)  HR: 75 (15 Dec 2020 11:05) (70 - 81)  BP: 130/62 (15 Dec 2020 11:05) (91/58 - 136/80)  BP(mean): --  RR: 18 (15 Dec 2020 11:05) (16 - 19)  SpO2: 97% (15 Dec 2020 11:05) (95% - 98%)    Physical Exam:    Constitutional: non-toxic, no distress, overweight   HEAD/EYES: anicteric, no conjunctival injection  ENT:  supple, no thrush  Cardiovascular:   normal S1, S2, faint audible murmur, 2 + edema of bilateral lower extremities   Respiratory:  clear BS bilaterally, no wheezes, no rales  GI:  soft, non-tender, normal bowel sounds  :  no baez, no CVA tenderness  Musculoskeletal:  no synovitis, right hand s/p dog bite wrapped - tender at 2nd phalanx, the rest of the fingers - no ROM impairment   Neurologic: awake and alert, normal strength, no focal findings  Skin:  no erythema, + noted rash which is fully crusted on left chest, shoulder and back post shingles - healing; left arm IV infiltrated - no phlebitis   Heme/Onc: no lymphadenopathy   Psychiatric:  awake, alert, appropriate mood, affect    WBC Count: 11.41 K/uL (12-14 @ 09:56)                            12.2   11.41 )-----------( 225      ( 14 Dec 2020 09:56 )             39.4       12-14    141  |  107  |  23  ----------------------------<  144<H>  4.2   |  26  |  1.01    Ca    9.3      14 Dec 2020 09:56    TPro  7.1  /  Alb  3.4  /  TBili  0.3  /  DBili  x   /  AST  26  /  ALT  101<H>  /  AlkPhos  290<H>  12-14          Creatinine Trend: 1.01<--      MICROBIOLOGY:  Flu With COVID-19 By LYNDSAY (12.14.20 @ 14:46)    SARS-CoV-2 Result: NotDetec    Influenza A Result: NotDetec    Influenza B Result: NotDetec    Resp Syn Virus Result: NotDetec        RADIOLOGY:

## 2020-12-15 NOTE — PROGRESS NOTE ADULT - ASSESSMENT
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 The pt presented to ED s/p dog bite, the pt owned the dog for 15 years. The pt was administering a medication to the dog and the dog bit her, dog's vaccines are up to date. The pt received a tetanus shot in ED. On admission, the pt is afebrile, WBC 11.41, Right hand Xray  revealed fracture of 2nd distal phalanx (I personally reviewed). The pt was seen by the plastic surgeon earlier, the pt tolerated repair of right index finger open fx from dog bite, splinted.  Antimicrobial therapy was initiated, received one time dose of Vancomycin IV, Unasyn IV is in progress.     12/15: afebrile, no new lab work, left arm IV infiltrated - no phlebitis, RN notified     1. Dog bite right 2nd phalanx  2. 2nd distal phalanx fracture    Plan  - continue Unasyn 3 grams IV q 6 hours, given the site of the puncture wound it is imperative to give antibiotics with good coverage for mouth ro of a dog, high risk of bone infection, joint infection and/or bacteremia which would be quite dangerous for her given that she has a new knee prosthesis only one month old.  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 The pt presented to ED s/p dog bite, the pt owned the dog for 15 years. The pt was administering a medication to the dog and the dog bit her, dog's vaccines are up to date. The pt received a tetanus shot in ED. On admission, the pt is afebrile, WBC 11.41, Right hand Xray  revealed fracture of 2nd distal phalanx (I personally reviewed). The pt was seen by the plastic surgeon earlier, the pt tolerated repair of right index finger open fx from dog bite, splinted.  Antimicrobial therapy was initiated, received one time dose of Vancomycin IV, Unasyn IV is in progress.     12/15: afebrile, no new lab work, left arm IV infiltrated - no phlebitis, RN notified     1. Dog bite right 2nd phalanx  2. 2nd distal phalanx fracture    Plan  - continue Unasyn 3 grams IV q 6 hours, given the site of the puncture wound it is imperative to give antibiotics with good coverage for mouth ro of a dog, high risk of bone infection, joint infection and/or bacteremia which would be quite dangerous for her given that she has a mg BID new knee prosthesis only one month old.  - would like to keep on IV antibiotics for another day and if everything is stable (VSS, no fever, no leukocytosis, no worsening arm swelling or erythema) can likely be discharged on PO Augmentin 875mg BID and follow up with plastic surgery next week   - monitor pt's temperatures  - monitor pt's WBC  - obtain blood cultures if spikes a fever  - pain control  - obtain Hgb A1c  - received Tdap vaccine in ED  - plastic surgery f/up

## 2020-12-15 NOTE — PROGRESS NOTE ADULT - ATTENDING COMMENTS
Discussed with Siria Gee NP. I have personally seen, examined and participated in the care of this patient. I have reviewed all pertinent clinical information, including history, physical exam, plan and the NP's note and agree. Note has been reviewed and made any necessary modifications.     Saturnino Padilla DO  Cell: [phone]  Pager [phone]  Infectious Disease Attending  After 5pm/weekends please call [phone] for all inquiries and new consults

## 2020-12-16 ENCOUNTER — TRANSCRIPTION ENCOUNTER (OUTPATIENT)
Age: 65
End: 2020-12-16

## 2020-12-16 VITALS
HEART RATE: 69 BPM | SYSTOLIC BLOOD PRESSURE: 118 MMHG | DIASTOLIC BLOOD PRESSURE: 70 MMHG | OXYGEN SATURATION: 95 % | RESPIRATION RATE: 18 BRPM | TEMPERATURE: 98 F

## 2020-12-16 LAB — CRP SERPL-MCNC: 1.23 MG/DL — HIGH (ref 0–0.4)

## 2020-12-16 PROCEDURE — 99232 SBSQ HOSP IP/OBS MODERATE 35: CPT

## 2020-12-16 PROCEDURE — 99239 HOSP IP/OBS DSCHRG MGMT >30: CPT

## 2020-12-16 RX ADMIN — Medication 650 MILLIGRAM(S): at 04:06

## 2020-12-16 RX ADMIN — AMPICILLIN SODIUM AND SULBACTAM SODIUM 200 GRAM(S): 250; 125 INJECTION, POWDER, FOR SUSPENSION INTRAMUSCULAR; INTRAVENOUS at 11:59

## 2020-12-16 RX ADMIN — Medication 1000 UNIT(S): at 11:56

## 2020-12-16 RX ADMIN — Medication 1 TABLET(S): at 11:56

## 2020-12-16 RX ADMIN — AMPICILLIN SODIUM AND SULBACTAM SODIUM 200 GRAM(S): 250; 125 INJECTION, POWDER, FOR SUSPENSION INTRAMUSCULAR; INTRAVENOUS at 06:43

## 2020-12-16 RX ADMIN — TRAMADOL HYDROCHLORIDE 50 MILLIGRAM(S): 50 TABLET ORAL at 08:47

## 2020-12-16 RX ADMIN — Medication 175 MICROGRAM(S): at 06:43

## 2020-12-16 RX ADMIN — PANTOPRAZOLE SODIUM 40 MILLIGRAM(S): 20 TABLET, DELAYED RELEASE ORAL at 08:00

## 2020-12-16 RX ADMIN — TRAMADOL HYDROCHLORIDE 50 MILLIGRAM(S): 50 TABLET ORAL at 09:30

## 2020-12-16 RX ADMIN — TRAMADOL HYDROCHLORIDE 50 MILLIGRAM(S): 50 TABLET ORAL at 00:09

## 2020-12-16 RX ADMIN — Medication 81 MILLIGRAM(S): at 11:56

## 2020-12-16 RX ADMIN — Medication 650 MILLIGRAM(S): at 04:41

## 2020-12-16 RX ADMIN — POLYETHYLENE GLYCOL 3350 17 GRAM(S): 17 POWDER, FOR SOLUTION ORAL at 11:56

## 2020-12-16 NOTE — PROGRESS NOTE ADULT - SUBJECTIVE AND OBJECTIVE BOX
Patient is a 65y old  Female who presents with a chief complaint of   HPI:  65 yo female with hx dm, copd, c/o dog bite to right 2nd digit. Patient was giving her dog (pitbull) a pill this am, and dog bit finger. Denies other injury. dogs vaccines are utd. patient denies any other medical problems .	   (14 Dec 2020 12:45)    SUBJECTIVE & OBJECTIVE: Pt seen and examined at bedside. Doing well.  Pain well controlled  PHYSICAL EXAM:  Vital Signs Last 24 Hrs  T(C): 36.6 (16 Dec 2020 05:23), Max: 37.1 (15 Dec 2020 17:24)  T(F): 97.8 (16 Dec 2020 05:23), Max: 98.7 (15 Dec 2020 17:24)  HR: 66 (16 Dec 2020 05:23) (66 - 88)  BP: 124/59 (16 Dec 2020 05:23) (108/65 - 139/68)  RR: 18 (16 Dec 2020 05:23) (16 - 18)  SpO2: 97% (16 Dec 2020 05:23) (95% - 97%)  Daily     Daily Weight in k.2 (16 Dec 2020 05:23)I&O's Detail    15 Dec 2020 07:01  -  16 Dec 2020 07:00  --------------------------------------------------------  IN:  IV PiggyBack: 400 mL   Oral Fluid: 720 mL  Total IN: 1120 mL    OUT:Total OUT: 0 mL  Total NET: 1120 mL        GENERAL: NAD, well-groomed, well-developed  HEAD:  Atraumatic, Normocephalic  EYES: EOMI, PERRLA, conjunctiva and sclera clear  ENMT: Moist mucous membranes  NECK: Supple, No JVD  NERVOUS SYSTEM:  Alert & Oriented X3, Motor Strength 5/5 B/L upper and lower extremities; DTRs 2+ intact and symmetric  CHEST/LUNG: Clear to auscultation bilaterally; No rales, rhonchi, wheezing, or rubs  HEART: Regular rate and rhythm; No murmurs, rubs, or gallops  ABDOMEN: Soft, Nontender, Nondistended; Bowel sounds present  EXTREMITIES:  2+ Peripheral Pulses, No clubbing, cyanosis, or edema  right hand with surgical splint in place.   LABS:                        12.2   11.41 )-----------( 225      ( 14 Dec 2020 09:56 )             39.4   PT/INR - ( 14 Dec 2020 09:56 )   PT: 11.3 sec;   INR: 0.97 ratio         PTT - ( 14 Dec 2020 09:56 )  PTT:29.9 sec  CAPILLARY BLOOD GLUCOSE        RECENT CULTURES:  RADIOLOGY & ADDITIONAL TESTS:

## 2020-12-16 NOTE — DISCHARGE NOTE PROVIDER - PROVIDER TOKENS
PROVIDER:[TOKEN:[[id number]],FOLLOWUP:[2 weeks]],PROVIDER:[TOKEN:[[id number]],SCHEDULEDAPPT:[12/31/2020]]

## 2020-12-16 NOTE — PROGRESS NOTE ADULT - SUBJECTIVE AND OBJECTIVE BOX
DOMITILA SHAHID  MRN-[id number]    Follow Up:  dog bite    Interval History: The pt is in bed, no distress, in a good mood. The pt is afebrile. Today [birth year] her birthday    ROS:    [ ] Unobtainable because:  [ X] All other systems negative    Constitutional: no fever, no chills  Head: no trauma  Eyes: no vision changes, no eye pain  ENT:  no sore throat, no rhinorrhea  Cardiovascular:  no chest pain, no palpitation  Respiratory:  no SOB, no cough  GI:  no abd pain, no vomiting, no diarrhea  urinary: no dysuria, no hematuria, no flank pain  musculoskeletal:  right 2nd finger mild pain   skin:  no rash  neurology:  no headache, no seizure, no change in mental status  psych: no anxiety, no depression         Allergies  No Known Allergies        ANTIMICROBIALS:    ampicillin/sulbactam  IVPB 3 every 6 hours      MEDICATIONS  (STANDING):  aspirin enteric coated 81 daily  budesonide 160 MICROgram(s)/formoterol 4.5 MICROgram(s) Inhaler 2 two times a day  levothyroxine 175 daily  pantoprazole    Tablet 40 before breakfast  polyethylene glycol 3350 17 daily  simvastatin 20 at bedtime      PRN  acetaminophen   Tablet .. 650 milliGRAM(s) Oral every 6 hours PRN  ALBUTerol    90 MICROgram(s) HFA Inhaler 2 Puff(s) Inhalation every 6 hours PRN  traMADol 50 milliGRAM(s) Oral every 6 hours PRN      Physical Exam:  Vital Signs Last 24 Hrs  T(F): 98 (12-16-20 @ 11:19), Max: 98.7 (12-15-20 @ 17:24)  HR: 69 (12-16-20 @ 11:19)  BP: 118/70 (12-16-20 @ 11:19)  RR: 18 (12-16-20 @ 11:19)  SpO2: 95% (12-16-20 @ 11:19) (95% - 97%)  Wt(kg): --    Physical Exam:    Constitutional: non-toxic, no distress, overweight   HEAD/EYES: anicteric, no conjunctival injection  ENT:  supple, no thrush  Cardiovascular:   normal S1, S2, faint audible murmur, 1 + edema of bilateral lower extremities   Respiratory:  clear BS bilaterally, no wheezes, no rales  GI:  soft, non-tender, normal bowel sounds  :  no baez, no CVA tenderness  Musculoskeletal:  no synovitis, right hand s/p dog bite wrapped - tender at 2nd phalanx, the rest of the fingers - no ROM impairment   Neurologic: awake and alert, normal strength, no focal findings  Skin:  no erythema, + noted rash which is fully crusted on left chest, shoulder and back post shingles - healing  Heme/Onc: no lymphadenopathy   Psychiatric:  awake, alert, appropriate mood, affect  WBC Count: 11.41 K/uL (12-14 @ 09:56)      Creatinine Trend: 1.01<--  Sedimentation Rate, Erythrocyte (12.15.20 @ 20:39)    Sedimentation Rate, Erythrocyte: 30 mm/hr    C-Reactive Protein, Serum (12.16.20 @ 01:21)    C-Reactive Protein, Serum: 1.23 mg/dL      MICROBIOLOGY:  Flu With COVID-19 By LYNDSAY (12.14.20 @ 14:46)    SARS-CoV-2 Result: NotDetec    Influenza A Result: NotDetec    Influenza B Result: NotDetec    Resp Syn Virus Result: NotDetec        RADIOLOGY:

## 2020-12-16 NOTE — DISCHARGE NOTE NURSING/CASE MANAGEMENT/SOCIAL WORK - PATIENT PORTAL LINK FT
You can access the FollowMyHealth Patient Portal offered by [facility] by registering at the following website: [email or web address]. By joining [facility]’s FollowMyHealth portal, you will also be able to view your health information using other applications (apps) compatible with our system.

## 2020-12-16 NOTE — PROGRESS NOTE ADULT - ASSESSMENT
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 The pt presented to ED s/p dog bite, the pt owned the dog for 15 years. The pt was administering a medication to the dog and the dog bit her, dog's vaccines are up to date. The pt received a tetanus shot in ED. On admission, the pt is afebrile, WBC 11.41, Right hand Xray  revealed fracture of 2nd distal phalanx (I personally reviewed). The pt was seen by the plastic surgeon earlier, the pt tolerated repair of right index finger open fx from dog bite, splinted.  Antimicrobial therapy was initiated, received one time dose of Vancomycin IV, Unasyn IV is in progress.     12/15: afebrile, no new lab work, left arm IV infiltrated - no phlebitis, RN notified   12/16: doing well, ready for d/c discussed in detail with patient varius providers option for treatment where choose long course of IV abx and others give a short course. She presented immediately after the event and had the wound irrigated and cleaned by plastic surgery and was placed on antibiotics     1. Dog bite right 2nd phalanx  2. 2nd distal phalanx fracture    Plan  - continue Unasyn 3 grams IV q 6 hours, while inpatient  - OK to be discharged on PO Augmentin 875mg BID x14 more days and follow up with plastic surgery next week as well as with me in 2-3 weeks  - pain control  - received Tdap vaccine in ED  - plastic surgery f/up outpatient     d/w Dr. Evgeny Padilla, DO  Cell: [phone]  Pager [phone]  Infectious Disease Attending  After 5pm/weekends please call [phone] for all inquiries and new consults

## 2020-12-16 NOTE — DISCHARGE NOTE PROVIDER - CARE PROVIDER_API CALL
Nav Cuevas  PLASTIC SURGERY  [address] [facility], [address]  [place], [place] [id number]  Phone: [phone]  Fax: [phone]  Follow Up Time: 2 weeks    Saturnino Padilla (DO)  Internal Medicine  [address] [place] [id number]  Phone: [phone]  Fax: [phone]  Scheduled Appointment: 12/31/2020

## 2020-12-16 NOTE — DISCHARGE NOTE PROVIDER - NSDCCPCAREPLAN_GEN_ALL_CORE_FT
PRINCIPAL DISCHARGE DIAGNOSIS  Diagnosis: Dog bite  Assessment and Plan of Treatment: - keep wound clean and dry  - follow up with Dr. Padilla as outpatinet on 12/31      SECONDARY DISCHARGE DIAGNOSES  Diagnosis: Acquired hypothyroidism  Assessment and Plan of Treatment: - take all home medications as prescribed    Diagnosis: Dyslipidemia  Assessment and Plan of Treatment: - take all home medications as prescribed    Diagnosis: Finger fracture, right  Assessment and Plan of Treatment: - follow up with Dr. Cuevas on 01/07 2020     PRINCIPAL DISCHARGE DIAGNOSIS  Diagnosis: Dog bite  Assessment and Plan of Treatment: - keep wound clean and dry  - follow up with Dr. Padilla in office on 12/31/2020      SECONDARY DISCHARGE DIAGNOSES  Diagnosis: Acquired hypothyroidism  Assessment and Plan of Treatment: - take all home medications as prescribed    Diagnosis: Dyslipidemia  Assessment and Plan of Treatment: - take all home medications as prescribed    Diagnosis: Finger fracture, right  Assessment and Plan of Treatment: - follow up with Dr. Cuevas on 01/07 2020

## 2020-12-16 NOTE — DISCHARGE NOTE PROVIDER - NSDCMRMEDTOKEN_GEN_ALL_CORE_FT
Advair Diskus 100 mcg-50 mcg inhalation powder: 1 puff(s) inhaled 2 times a day  albuterol 90 mcg/inh inhalation aerosol: 1 puff(s) inhaled every 6 hours  amoxicillin-clavulanate 875 mg-125 mg oral tablet: 1 tab(s) orally 2 times a day   aspirin 81 mg oral delayed release tablet: 1 tab(s) orally once a day  Centrum Women&#x27;s oral tablet: 1 tab(s) orally once a day  meloxicam 7.5 mg oral tablet: 1 tab(s) orally once a day  MiraLax oral powder for reconstitution:   Protonix 40 mg oral delayed release tablet: 1 tab(s) orally once a day  simvastatin 20 mg oral tablet: 1 tab(s) orally once a day (at bedtime)  Synthroid 175 mcg (0.175 mg) oral tablet: 1 tab(s) orally once a day  traMADol 50 mg oral tablet: 1 tab(s) orally every 6 hours  Valtrex 1 g oral tablet: 1 tab(s) orally 2 times a day  Vitamin D3 1000 intl units (25 mcg) oral tablet: 1 tab(s) orally once a day

## 2020-12-16 NOTE — PROGRESS NOTE ADULT - ASSESSMENT
64 years old female with history of diabetes status post dog bite     Problem/Plan - 1:  ·  Problem: Dog bite, initial encounter.  Plan: plan to transition to PO  - outpatient follow up with ID  - follow up with Dr. Cuevas in 1 week      Problem/Plan - 2:  ·  Problem: Open finger fracture.  Plan: follow up in 1 week with [facility]    Problem/Plan - 3:  ·  Problem: Dyslipidemia.  Plan: continue home meds.     Problem/Plan - 4:  ·  Problem: Acquired hypothyroidism.  Plan: continue home meds.

## 2020-12-16 NOTE — DISCHARGE NOTE PROVIDER - HOSPITAL COURSE
Patient is a 65y old  Female who presents with a chief complaint of   HPI:  65 yo female with hx dm, copd, c/o dog bite to right 2nd digit. Patient was giving her dog (pitbull) a pill this am, and dog bit finger. Denies other injury. dogs vaccines are utd. patient denies any other medical problems    	   (14 Dec 2020 12:45)    SUBJECTIVE & OBJECTIVE: Pt seen and examined at bedside. She is doing well.  Seen and evaluated by ID and Plastic Surgery,  Started on abx and will follow up with each discipline as an outpatient.  Doing well at time of discharge   PHYSICAL EXAM:  Vital Signs Last 24 Hrs  T(C): 36.7 (16 Dec 2020 11:19), Max: 37.1 (15 Dec 2020 17:24)  T(F): 98 (16 Dec 2020 11:19), Max: 98.7 (15 Dec 2020 17:24)  HR: 69 (16 Dec 2020 11:19) (66 - 88)  BP: 118/70 (16 Dec 2020 11:19) (108/65 - 139/68)  RR: 18 (16 Dec 2020 11:19) (16 - 18)  SpO2: 95% (16 Dec 2020 11:19) (95% - 97%)  Daily     Daily Weight in k.2 (16 Dec 2020 05:23)I&O's Detail    15 Dec 2020 07:01  -  16 Dec 2020 07:00  --------------------------------------------------------  IN:    IV PiggyBack: 400 mL    Oral Fluid: 720 mL  Total IN: 1120 mL    OUT:  Total OUT: 0 mL    Total NET: 1120 mL      16 Dec 2020 07:01  -  16 Dec 2020 12:02  --------------------------------------------------------  IN:    IV PiggyBack: 100 mL    Oral Fluid: 360 mL  Total IN: 460 mL    OUT:  Total OUT: 0 mL    Total NET: 460 mL        GENERAL: NAD, well-groomed, well-developed  HEAD:  Atraumatic, Normocephalic  EYES: EOMI, PERRLA, conjunctiva and sclera clear  ENMT: Moist mucous membranes  NECK: Supple, No JVD  NERVOUS SYSTEM:  Alert & Oriented X3, Motor Strength 5/5 B/L upper and lower extremities; DTRs 2+ intact and symmetric  CHEST/LUNG: Clear to auscultation bilaterally; No rales, rhonchi, wheezing, or rubs  HEART: Regular rate and rhythm; No murmurs, rubs, or gallops  ABDOMEN: Soft, Nontender, Nondistended; Bowel sounds present  EXTREMITIES:  2+ Peripheral Pulses, No clubbing, cyanosis, or edema  LABS:  CAPILLARY BLOOD GLUCOSE        RECENT CULTURES:  RADIOLOGY & ADDITIONAL TESTS: < from: Xray Finger, Right Hand (20 @ 09:59) >    IMPRESSION:  Fracture second distal phalanx    < end of copied text >

## 2020-12-22 DIAGNOSIS — S67.190A CRUSHING INJURY OF RIGHT INDEX FINGER, INITIAL ENCOUNTER: ICD-10-CM

## 2020-12-22 DIAGNOSIS — S62.630B DISPLACED FRACTURE OF DISTAL PHALANX OF RIGHT INDEX FINGER, INITIAL ENCOUNTER FOR OPEN FRACTURE: ICD-10-CM

## 2020-12-22 DIAGNOSIS — E03.9 HYPOTHYROIDISM, UNSPECIFIED: ICD-10-CM

## 2020-12-22 DIAGNOSIS — G47.00 INSOMNIA, UNSPECIFIED: ICD-10-CM

## 2020-12-22 DIAGNOSIS — Z87.891 PERSONAL HISTORY OF NICOTINE DEPENDENCE: ICD-10-CM
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
## 2020-12-22 DIAGNOSIS — E11.9 TYPE 2 DIABETES MELLITUS WITHOUT COMPLICATIONS: ICD-10-CM

## 2020-12-22 DIAGNOSIS — J45.909 UNSPECIFIED ASTHMA, UNCOMPLICATED: ICD-10-CM
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
## 2020-12-22 DIAGNOSIS — E78.5 HYPERLIPIDEMIA, UNSPECIFIED: ICD-10-CM

## 2020-12-22 DIAGNOSIS — S68.120A PARTIAL TRAUMATIC METACARPOPHALANGEAL AMPUTATION OF RIGHT INDEX FINGER, INITIAL ENCOUNTER: ICD-10-CM

## 2021-01-07 ENCOUNTER — APPOINTMENT (OUTPATIENT)
Dept: INFECTIOUS DISEASE | Facility: CLINIC | Age: 66
End: 2021-01-07
Payer: MEDICARE

## 2021-01-07 VITALS
RESPIRATION RATE: 16 BRPM | SYSTOLIC BLOOD PRESSURE: 156 MMHG | BODY MASS INDEX: 40.25 KG/M2 | DIASTOLIC BLOOD PRESSURE: 84 MMHG | OXYGEN SATURATION: 94 % | HEIGHT: 60 IN | TEMPERATURE: 98.3 F | HEART RATE: 86 BPM | WEIGHT: 205 LBS

## 2021-01-07 DIAGNOSIS — R60.0 LOCALIZED EDEMA: ICD-10-CM

## 2021-01-07 DIAGNOSIS — S61.451D OPEN BITE OF RIGHT HAND, SUBSEQUENT ENCOUNTER: ICD-10-CM
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
## 2021-01-07 DIAGNOSIS — Z82.5 FAMILY HISTORY OF ASTHMA AND OTHER CHRONIC LOWER RESPIRATORY DISEASES: ICD-10-CM

## 2021-01-07 PROCEDURE — 99214 OFFICE O/P EST MOD 30 MIN: CPT

## 2021-01-09 ENCOUNTER — APPOINTMENT (OUTPATIENT)
Dept: ULTRASOUND IMAGING | Facility: CLINIC | Age: 66
End: 2021-01-09
Payer: MEDICARE

## 2021-01-09 ENCOUNTER — OUTPATIENT (OUTPATIENT)
Dept: OUTPATIENT SERVICES | Facility: HOSPITAL | Age: 66
LOS: 1 days | End: 2021-01-09
Payer: MEDICARE

## 2021-01-09 DIAGNOSIS — H26.9 UNSPECIFIED CATARACT: Chronic | ICD-10-CM

## 2021-01-09 DIAGNOSIS — Z98.89 OTHER SPECIFIED POSTPROCEDURAL STATES: Chronic | ICD-10-CM

## 2021-01-09 DIAGNOSIS — Z00.8 ENCOUNTER FOR OTHER GENERAL EXAMINATION: ICD-10-CM

## 2021-01-09 PROCEDURE — 93970 EXTREMITY STUDY: CPT | Mod: 26

## 2021-01-09 PROCEDURE — 93970 EXTREMITY STUDY: CPT

## 2021-01-11 LAB
ALBUMIN SERPL ELPH-MCNC: 3.9 G/DL
ALP BLD-CCNC: 145 U/L
ALT SERPL-CCNC: 12 U/L
ANION GAP SERPL CALC-SCNC: 11 MMOL/L
AST SERPL-CCNC: 16 U/L
BASOPHILS # BLD AUTO: 0.04 K/UL
BASOPHILS NFR BLD AUTO: 0.4 %
BILIRUB SERPL-MCNC: 0.2 MG/DL
BUN SERPL-MCNC: 23 MG/DL
CALCIUM SERPL-MCNC: 9.3 MG/DL
CHLORIDE SERPL-SCNC: 105 MMOL/L
CO2 SERPL-SCNC: 22 MMOL/L
CREAT SERPL-MCNC: 0.98 MG/DL
CRP SERPL-MCNC: 0.52 MG/DL
EOSINOPHIL # BLD AUTO: 0.22 K/UL
EOSINOPHIL NFR BLD AUTO: 2.1 %
ERYTHROCYTE [SEDIMENTATION RATE] IN BLOOD BY WESTERGREN METHOD: 37 MM/HR
GLUCOSE SERPL-MCNC: 116 MG/DL
HCT VFR BLD CALC: 37.7 %
HGB BLD-MCNC: 11.5 G/DL
IMM GRANULOCYTES NFR BLD AUTO: 0.4 %
LYMPHOCYTES # BLD AUTO: 2.36 K/UL
LYMPHOCYTES NFR BLD AUTO: 22.7 %
MAN DIFF?: NORMAL
MCHC RBC-ENTMCNC: 28.8 PG
MCHC RBC-ENTMCNC: 30.5 GM/DL
MCV RBC AUTO: 94.5 FL
MONOCYTES # BLD AUTO: 0.83 K/UL
MONOCYTES NFR BLD AUTO: 8 %
NEUTROPHILS # BLD AUTO: 6.92 K/UL
NEUTROPHILS NFR BLD AUTO: 66.4 %
PLATELET # BLD AUTO: 193 K/UL
POTASSIUM SERPL-SCNC: 4.6 MMOL/L
PROT SERPL-MCNC: 6.2 G/DL
RBC # BLD: 3.99 M/UL
RBC # FLD: 14 %
SODIUM SERPL-SCNC: 138 MMOL/L
WBC # FLD AUTO: 10.41 K/UL

## 2021-01-12 PROBLEM — Z82.5 FAMILY HISTORY OF EMPHYSEMA: Status: ACTIVE | Noted: 2021-01-12

## 2021-01-12 RX ORDER — LEVOTHYROXINE SODIUM 175 UG/1
175 TABLET ORAL
Refills: 0 | Status: ACTIVE | COMMUNITY

## 2021-01-12 RX ORDER — SIMVASTATIN 80 MG/1
TABLET, FILM COATED ORAL
Refills: 0 | Status: ACTIVE | COMMUNITY

## 2021-01-26 NOTE — PHYSICAL EXAM
[General Appearance - Alert] : alert [Sclera] : the sclera and conjunctiva were normal [General Appearance - In No Acute Distress] : in no acute distress [PERRL With Normal Accommodation] : pupils were equal in size, round, reactive to light [Extraocular Movements] : extraocular movements were intact [Outer Ear] : the ears and nose were normal in appearance [Oropharynx] : the oropharynx was normal with no thrush [Neck Appearance] : the appearance of the neck was normal [Neck Cervical Mass (___cm)] : no neck mass was observed [Jugular Venous Distention Increased] : there was no jugular-venous distention [Thyroid Diffuse Enlargement] : the thyroid was not enlarged [Heart Rate And Rhythm] : heart rate was normal and rhythm regular [Auscultation Breath Sounds / Voice Sounds] : lungs were clear to auscultation bilaterally [Heart Sounds] : normal S1 and S2 [Heart Sounds Gallop] : no gallops [Murmurs] : no murmurs [Heart Sounds Pericardial Friction Rub] : no pericardial rub [Abdomen Soft] : soft [Bowel Sounds] : normal bowel sounds [Abdomen Tenderness] : non-tender [] : no hepato-splenomegaly [Abdomen Mass (___ Cm)] : no abdominal mass palpated [Costovertebral Angle Tenderness] : no CVA tenderness [FreeTextEntry1] : well healed bite kraig of the right hand  [No Focal Deficits] : no focal deficits [Oriented To Time, Place, And Person] : oriented to person, place, and time

## 2021-01-26 NOTE — ASSESSMENT
[FreeTextEntry1] : 65F s/p dog bite by her own dog. completed abx. tdap during ER visit \par doing well, no need for more abx  \par labs \par follow up with plastic surgery \par pt to see her PMD about the edema in her legs \par Follow up PRN

## 2021-01-26 NOTE — HISTORY OF PRESENT ILLNESS
[FreeTextEntry1] : 65F thyroidectomy on synthroid who was seen in [facility] for dog bite to hand. Sent home on 14 days of Augmentin and completed. She denies any complaints and reports healed well. \par Father emphysema\par Mother  of old age 88  \par \par synthroid 175\par simvastatin \par Aspirin 81mg \par multivitamin \par vitamin C 500mg\par vitamin D 1000mcg

## 2022-09-26 ENCOUNTER — INPATIENT (INPATIENT)
Facility: HOSPITAL | Age: 67
LOS: 2 days | Discharge: HOME | End: 2022-09-29
Attending: GENERAL ACUTE CARE HOSPITAL | Admitting: GENERAL ACUTE CARE HOSPITAL

## 2022-09-26 VITALS
SYSTOLIC BLOOD PRESSURE: 91 MMHG | HEART RATE: 92 BPM | OXYGEN SATURATION: 99 % | RESPIRATION RATE: 20 BRPM | TEMPERATURE: 99 F | DIASTOLIC BLOOD PRESSURE: 58 MMHG | HEIGHT: 60 IN | WEIGHT: 210.1 LBS

## 2022-09-26 DIAGNOSIS — N17.9 ACUTE KIDNEY FAILURE, UNSPECIFIED: ICD-10-CM

## 2022-09-26 DIAGNOSIS — E66.01 MORBID (SEVERE) OBESITY DUE TO EXCESS CALORIES: ICD-10-CM

## 2022-09-26 DIAGNOSIS — N13.30 UNSPECIFIED HYDRONEPHROSIS: ICD-10-CM

## 2022-09-26 DIAGNOSIS — Z98.89 OTHER SPECIFIED POSTPROCEDURAL STATES: Chronic | ICD-10-CM

## 2022-09-26 DIAGNOSIS — E78.5 HYPERLIPIDEMIA, UNSPECIFIED: ICD-10-CM

## 2022-09-26 DIAGNOSIS — H26.9 UNSPECIFIED CATARACT: Chronic | ICD-10-CM

## 2022-09-26 DIAGNOSIS — N10 ACUTE PYELONEPHRITIS: ICD-10-CM

## 2022-09-26 DIAGNOSIS — E03.9 HYPOTHYROIDISM, UNSPECIFIED: ICD-10-CM

## 2022-09-26 LAB
ALBUMIN SERPL ELPH-MCNC: 2.6 G/DL — LOW (ref 3.3–5)
ALP SERPL-CCNC: 92 U/L — SIGNIFICANT CHANGE UP (ref 40–120)
ALT FLD-CCNC: 16 U/L — SIGNIFICANT CHANGE UP (ref 12–78)
ANION GAP SERPL CALC-SCNC: 8 MMOL/L — SIGNIFICANT CHANGE UP (ref 5–17)
APPEARANCE UR: ABNORMAL
AST SERPL-CCNC: 14 U/L — LOW (ref 15–37)
BACTERIA # UR AUTO: ABNORMAL
BASOPHILS # BLD AUTO: 0.05 K/UL — SIGNIFICANT CHANGE UP (ref 0–0.2)
BASOPHILS NFR BLD AUTO: 0.4 % — SIGNIFICANT CHANGE UP (ref 0–2)
BILIRUB SERPL-MCNC: 0.3 MG/DL — SIGNIFICANT CHANGE UP (ref 0.2–1.2)
BILIRUB UR-MCNC: NEGATIVE — SIGNIFICANT CHANGE UP
BUN SERPL-MCNC: 30 MG/DL — HIGH (ref 7–23)
CALCIUM SERPL-MCNC: 8.9 MG/DL — SIGNIFICANT CHANGE UP (ref 8.5–10.1)
CHLORIDE SERPL-SCNC: 105 MMOL/L — SIGNIFICANT CHANGE UP (ref 96–108)
CO2 SERPL-SCNC: 26 MMOL/L — SIGNIFICANT CHANGE UP (ref 22–31)
COLOR SPEC: YELLOW — SIGNIFICANT CHANGE UP
COMMENT - URINE: SIGNIFICANT CHANGE UP
CREAT SERPL-MCNC: 1.2 MG/DL — SIGNIFICANT CHANGE UP (ref 0.5–1.3)
DIFF PNL FLD: ABNORMAL
EGFR: 49 ML/MIN/1.73M2 — LOW
EOSINOPHIL # BLD AUTO: 0.07 K/UL — SIGNIFICANT CHANGE UP (ref 0–0.5)
EOSINOPHIL NFR BLD AUTO: 0.5 % — SIGNIFICANT CHANGE UP (ref 0–6)
EPI CELLS # UR: SIGNIFICANT CHANGE UP
FLUAV AG NPH QL: SIGNIFICANT CHANGE UP
FLUBV AG NPH QL: SIGNIFICANT CHANGE UP
GLUCOSE SERPL-MCNC: 154 MG/DL — HIGH (ref 70–99)
GLUCOSE UR QL: NEGATIVE MG/DL — SIGNIFICANT CHANGE UP
GRAN CASTS # UR COMP ASSIST: ABNORMAL /LPF
HCT VFR BLD CALC: 40.6 % — SIGNIFICANT CHANGE UP (ref 34.5–45)
HGB BLD-MCNC: 12.7 G/DL — SIGNIFICANT CHANGE UP (ref 11.5–15.5)
IMM GRANULOCYTES NFR BLD AUTO: 0.4 % — SIGNIFICANT CHANGE UP (ref 0–0.9)
KETONES UR-MCNC: NEGATIVE — SIGNIFICANT CHANGE UP
LACTATE SERPL-SCNC: 1.4 MMOL/L — SIGNIFICANT CHANGE UP (ref 0.7–2)
LEUKOCYTE ESTERASE UR-ACNC: ABNORMAL
LIDOCAIN IGE QN: 73 U/L — SIGNIFICANT CHANGE UP (ref 73–393)
LYMPHOCYTES # BLD AUTO: 1.33 K/UL — SIGNIFICANT CHANGE UP (ref 1–3.3)
LYMPHOCYTES # BLD AUTO: 10.4 % — LOW (ref 13–44)
MCHC RBC-ENTMCNC: 28.8 PG — SIGNIFICANT CHANGE UP (ref 27–34)
MCHC RBC-ENTMCNC: 31.3 G/DL — LOW (ref 32–36)
MCV RBC AUTO: 92.1 FL — SIGNIFICANT CHANGE UP (ref 80–100)
MONOCYTES # BLD AUTO: 1.25 K/UL — HIGH (ref 0–0.9)
MONOCYTES NFR BLD AUTO: 9.8 % — SIGNIFICANT CHANGE UP (ref 2–14)
NEUTROPHILS # BLD AUTO: 9.99 K/UL — HIGH (ref 1.8–7.4)
NEUTROPHILS NFR BLD AUTO: 78.5 % — HIGH (ref 43–77)
NITRITE UR-MCNC: POSITIVE
NRBC # BLD: 0 /100 WBCS — SIGNIFICANT CHANGE UP (ref 0–0)
PH UR: 7 — SIGNIFICANT CHANGE UP (ref 5–8)
PLATELET # BLD AUTO: 195 K/UL — SIGNIFICANT CHANGE UP (ref 150–400)
POTASSIUM SERPL-MCNC: 4 MMOL/L — SIGNIFICANT CHANGE UP (ref 3.5–5.3)
POTASSIUM SERPL-SCNC: 4 MMOL/L — SIGNIFICANT CHANGE UP (ref 3.5–5.3)
PROT SERPL-MCNC: 6.6 GM/DL — SIGNIFICANT CHANGE UP (ref 6–8.3)
PROT UR-MCNC: 500 MG/DL
RBC # BLD: 4.41 M/UL — SIGNIFICANT CHANGE UP (ref 3.8–5.2)
RBC # FLD: 12.8 % — SIGNIFICANT CHANGE UP (ref 10.3–14.5)
RBC CASTS # UR COMP ASSIST: >50 /HPF (ref 0–4)
SARS-COV-2 RNA SPEC QL NAA+PROBE: SIGNIFICANT CHANGE UP
SODIUM SERPL-SCNC: 139 MMOL/L — SIGNIFICANT CHANGE UP (ref 135–145)
SP GR SPEC: 1.01 — SIGNIFICANT CHANGE UP (ref 1.01–1.02)
UROBILINOGEN FLD QL: NEGATIVE MG/DL — SIGNIFICANT CHANGE UP
WBC # BLD: 12.74 K/UL — HIGH (ref 3.8–10.5)
WBC # FLD AUTO: 12.74 K/UL — HIGH (ref 3.8–10.5)
WBC UR QL: >50

## 2022-09-26 PROCEDURE — 99285 EMERGENCY DEPT VISIT HI MDM: CPT

## 2022-09-26 PROCEDURE — 71045 X-RAY EXAM CHEST 1 VIEW: CPT | Mod: 26

## 2022-09-26 PROCEDURE — 99223 1ST HOSP IP/OBS HIGH 75: CPT

## 2022-09-26 PROCEDURE — 74177 CT ABD & PELVIS W/CONTRAST: CPT | Mod: 26,MC

## 2022-09-26 RX ORDER — KETOROLAC TROMETHAMINE 30 MG/ML
15 SYRINGE (ML) INJECTION ONCE
Refills: 0 | Status: DISCONTINUED | OUTPATIENT
Start: 2022-09-26 | End: 2022-09-26

## 2022-09-26 RX ORDER — MULTIVIT-MIN/FERROUS GLUCONATE 9 MG/15 ML
1 LIQUID (ML) ORAL
Qty: 0 | Refills: 0 | DISCHARGE

## 2022-09-26 RX ORDER — LEVOTHYROXINE SODIUM 125 MCG
175 TABLET ORAL DAILY
Refills: 0 | Status: DISCONTINUED | OUTPATIENT
Start: 2022-09-26 | End: 2022-09-29

## 2022-09-26 RX ORDER — ONDANSETRON 8 MG/1
4 TABLET, FILM COATED ORAL EVERY 8 HOURS
Refills: 0 | Status: DISCONTINUED | OUTPATIENT
Start: 2022-09-26 | End: 2022-09-29

## 2022-09-26 RX ORDER — ENOXAPARIN SODIUM 100 MG/ML
40 INJECTION SUBCUTANEOUS EVERY 12 HOURS
Refills: 0 | Status: DISCONTINUED | OUTPATIENT
Start: 2022-09-26 | End: 2022-09-29

## 2022-09-26 RX ORDER — CEFTRIAXONE 500 MG/1
2000 INJECTION, POWDER, FOR SOLUTION INTRAMUSCULAR; INTRAVENOUS EVERY 24 HOURS
Refills: 0 | Status: DISCONTINUED | OUTPATIENT
Start: 2022-09-27 | End: 2022-09-29

## 2022-09-26 RX ORDER — MORPHINE SULFATE 50 MG/1
4 CAPSULE, EXTENDED RELEASE ORAL ONCE
Refills: 0 | Status: DISCONTINUED | OUTPATIENT
Start: 2022-09-26 | End: 2022-09-26

## 2022-09-26 RX ORDER — PANTOPRAZOLE SODIUM 20 MG/1
40 TABLET, DELAYED RELEASE ORAL
Refills: 0 | Status: DISCONTINUED | OUTPATIENT
Start: 2022-09-26 | End: 2022-09-29

## 2022-09-26 RX ORDER — LANOLIN ALCOHOL/MO/W.PET/CERES
3 CREAM (GRAM) TOPICAL AT BEDTIME
Refills: 0 | Status: DISCONTINUED | OUTPATIENT
Start: 2022-09-26 | End: 2022-09-29

## 2022-09-26 RX ORDER — MELOXICAM 15 MG/1
1 TABLET ORAL
Qty: 0 | Refills: 0 | DISCHARGE

## 2022-09-26 RX ORDER — SIMVASTATIN 20 MG/1
20 TABLET, FILM COATED ORAL AT BEDTIME
Refills: 0 | Status: DISCONTINUED | OUTPATIENT
Start: 2022-09-26 | End: 2022-09-28

## 2022-09-26 RX ORDER — CEFTRIAXONE 500 MG/1
1000 INJECTION, POWDER, FOR SOLUTION INTRAMUSCULAR; INTRAVENOUS ONCE
Refills: 0 | Status: COMPLETED | OUTPATIENT
Start: 2022-09-26 | End: 2022-09-26

## 2022-09-26 RX ORDER — TRAMADOL HYDROCHLORIDE 50 MG/1
1 TABLET ORAL
Qty: 0 | Refills: 0 | DISCHARGE

## 2022-09-26 RX ORDER — VALACYCLOVIR 500 MG/1
1 TABLET, FILM COATED ORAL
Qty: 0 | Refills: 0 | DISCHARGE

## 2022-09-26 RX ORDER — ACETAMINOPHEN 500 MG
650 TABLET ORAL EVERY 6 HOURS
Refills: 0 | Status: DISCONTINUED | OUTPATIENT
Start: 2022-09-26 | End: 2022-09-29

## 2022-09-26 RX ORDER — POLYETHYLENE GLYCOL 3350 17 G/17G
0 POWDER, FOR SOLUTION ORAL
Qty: 0 | Refills: 0 | DISCHARGE

## 2022-09-26 RX ORDER — CHOLECALCIFEROL (VITAMIN D3) 125 MCG
1 CAPSULE ORAL
Qty: 0 | Refills: 0 | DISCHARGE

## 2022-09-26 RX ADMIN — Medication 175 MICROGRAM(S): at 09:43

## 2022-09-26 RX ADMIN — PANTOPRAZOLE SODIUM 40 MILLIGRAM(S): 20 TABLET, DELAYED RELEASE ORAL at 09:43

## 2022-09-26 RX ADMIN — ENOXAPARIN SODIUM 40 MILLIGRAM(S): 100 INJECTION SUBCUTANEOUS at 09:44

## 2022-09-26 RX ADMIN — SIMVASTATIN 20 MILLIGRAM(S): 20 TABLET, FILM COATED ORAL at 21:21

## 2022-09-26 RX ADMIN — CEFTRIAXONE 1000 MILLIGRAM(S): 500 INJECTION, POWDER, FOR SOLUTION INTRAMUSCULAR; INTRAVENOUS at 06:30

## 2022-09-26 RX ADMIN — Medication 15 MILLIGRAM(S): at 03:29

## 2022-09-26 RX ADMIN — CEFTRIAXONE 100 MILLIGRAM(S): 500 INJECTION, POWDER, FOR SOLUTION INTRAMUSCULAR; INTRAVENOUS at 09:43

## 2022-09-26 RX ADMIN — MORPHINE SULFATE 4 MILLIGRAM(S): 50 CAPSULE, EXTENDED RELEASE ORAL at 05:45

## 2022-09-26 RX ADMIN — CEFTRIAXONE 100 MILLIGRAM(S): 500 INJECTION, POWDER, FOR SOLUTION INTRAMUSCULAR; INTRAVENOUS at 05:46

## 2022-09-26 RX ADMIN — ENOXAPARIN SODIUM 40 MILLIGRAM(S): 100 INJECTION SUBCUTANEOUS at 21:21

## 2022-09-26 RX ADMIN — Medication 650 MILLIGRAM(S): at 09:43

## 2022-09-26 RX ADMIN — MORPHINE SULFATE 4 MILLIGRAM(S): 50 CAPSULE, EXTENDED RELEASE ORAL at 07:00

## 2022-09-26 RX ADMIN — Medication 15 MILLIGRAM(S): at 04:00

## 2022-09-26 RX ADMIN — Medication 650 MILLIGRAM(S): at 10:00

## 2022-09-26 NOTE — H&P ADULT - ASSESSMENT
66 years old female with h/o hypothyroidism, HLD, morbid obesity present to ED with complain of abd pain radiating to right flank. Pain started on Thur, intermittent, 10/10 " severe pain", associated with chills and sweating. No dysuria, frequency or hematuria. No h/o kidney stones or frequent UTI  Hemodynamically stable, afebrile, sat well at RA. CXR image reviewed, no focal consolidation. WBC 12.74,Plt 195, K 4, Cr 1.2, lactate 1.4. UA appear dirty. CT abd/pelvis image reviewed, severe right hydronephrosis and marked  parenchymal thinning likely due to chronic UPJ obstruction. Cystitis with bilateral ascending ureteritis and possible pyelonephritis in the right kidney      Admitted with acute pyelonephritis, severe right hydronephrosis

## 2022-09-26 NOTE — H&P ADULT - HISTORY OF PRESENT ILLNESS
66 years old female with h/o hypothyroidism, HLD, morbid obesity present to ED with complain of abd pain radiating to right flank. Pain started on Thur, intermittent, 10/10 " severe pain", associated with chills and sweating. No dysuria, frequency or hematuria. No h/o kidney stones or frequent UTI  Hemodynamically stable, afebrile, sat well at RA. CXR image reviewed, no focal consolidation. WBC 12.74,Plt 195, K 4, Cr 1.2, lactate 1.4. UA appear dirty. CT abd/pelvis image reviewed, severe right hydronephrosis and marked  parenchymal thinning likely due to chronic UPJ obstruction. Cystitis with bilateral ascending ureteritis and possible pyelonephritis in the right kidney    SH: no toxic habits  FH: no family h/o HTN, DM

## 2022-09-26 NOTE — H&P ADULT - NSHPPHYSICALEXAM_GEN_ALL_CORE
CONSTITUTIONAL: Well developed, large body habitus, alert and cooperative, no acute distress.   EYES: PERRL,  no scleral icterus  ENT: Mucosa moist, tongue normal.   NECK: Neck supple, trachea midline, non-tender  CARDIAC: Normal S1 and S2. Regular rate and rhythms. No murmurs. No Pedal edema. Peripheral pulses intact  LUNGS: Equal air entry both lungs. No rales, rhonchi, wheezing. Normal respiratory effort.   ABDOMEN: Slight suprapubic tenderness +. No guarding or rebound tenderness. No hepatomegaly or splenomegaly. Bowel sound normal.  MUSCULOSKELETAL: Normocephalic, atraumatic. Right CVA tenderness +. No significant deformity or joint abnormality. No varicosities.  NEUROLOGICAL: No gross motor or sensory deficits  SKIN: no lesions or eruptions. Normal turgor  PSYCHIATRIC: A&O x 3, appropriate mood and affect

## 2022-09-26 NOTE — ED PROVIDER NOTE - PHYSICAL EXAMINATION
GEN: Awake, alert, interactive, NAD.  HEAD AND NECK: NC/AT. Airway patent. Neck supple.   EYES:  Clear b/l. EOMI. PERRL.   ENT: Moist mucus membranes.   CARDIAC: Regular rate, regular rhythm. No evident pedal edema.    RESP/CHEST: Normal respiratory effort with no use of accessory muscles or retractions. Clear throughout on auscultation.  ABD: Soft, non-distended, + TTP R lateral abd. No rebound, no guarding.   BACK: No midline spinal TTP. No CVAT.   EXTREMITIES: Moving all extremities with no apparent deformities.   SKIN: Warm, dry, intact normal color. No rash.   NEURO: AOx3, CN II-XII grossly intact, no focal deficits.   PSYCH: Appropriate mood and affect.

## 2022-09-26 NOTE — H&P ADULT - PROBLEM SELECTOR PLAN 3
Cr 1.2 ( no recent Cr in system, last in 2020 was 1)  ? due to infectious etiology vs UPJ obstruction of right kidney  Monitor renal function, avoid nephrotoxic substances  Nephrology consulted

## 2022-09-26 NOTE — ED ADULT NURSE NOTE - NSIMPLEMENTINTERV_GEN_ALL_ED
Implemented All Fall with Harm Risk Interventions:  [place] to call system. Call bell, personal items and telephone within reach. Instruct patient to call for assistance. Room bathroom lighting operational. Non-slip footwear when patient is off stretcher. Physically safe environment: no spills, clutter or unnecessary equipment. Stretcher in lowest position, wheels locked, appropriate side rails in place. Provide visual cue, wrist band, yellow gown, etc. Monitor gait and stability. Monitor for mental status changes and reorient to person, place, and time. Review medications for side effects contributing to fall risk. Reinforce activity limits and safety measures with patient and family. Provide visual clues: red socks.

## 2022-09-26 NOTE — ED PROVIDER NOTE - OBJECTIVE STATEMENT
66F w/ PMH hypothyroid, HLD pw R sided abd pain x 4 days. Pt reports onset s/p eating leftovers on Thursday, that night w/ feeling hot / cold, R sided abd pain, however symptoms resolved w/ Pepto Bismol. Pt felt well next day, w/ few hrs of R sided abd pain in evening, but passed. Pt went to bed 10P last night, w/ constant severe R sided pain since that time. Denies F/C, h/a, dizziness, CP, SOB, cough, N/V/D/C, UTI sx, LE pain / swelling.     PMH as above, PSH BL knees, nakia, , NKDA, meds as listed.

## 2022-09-26 NOTE — ED ADULT NURSE NOTE - ED STAT RN HANDOFF DETAILS
Handoff report given to AARON Martinez. Pt AOx4, respirations appear equal and unlabored, NAD noted. Safety measures maintained.

## 2022-09-26 NOTE — ED PROVIDER NOTE - CLINICAL SUMMARY MEDICAL DECISION MAKING FREE TEXT BOX
66F w/ PMH hypothyroid, HLD pw 4 days of intermittent R sided abd pain. AF, VSS. Appears uncomfortable, but non toxic appearing. Plan: CBC, CMP, lipase, lactate, UA/C, CT AP, CXR. Give Toradol. Re-eval.

## 2022-09-26 NOTE — ED ADULT NURSE NOTE - OBJECTIVE STATEMENT
Pt AOx4 and ambulatory with can use. Pt c/o intermittent abdominal pain since Thursday that began after meal. Pt reports taking Pepto Bismol Thursday night which she states was effective but when she took it tonight it did not change pain. Increased pain noted on palpation of right abdomen. Pt denies SOB, fever/chills, sneezing, coughing, dizziness, headache, blurred vision, chest pain, N/V/D, or dysuria.

## 2022-09-26 NOTE — H&P ADULT - PROBLEM SELECTOR PLAN 1
right flank pain, CVA tenderness +, suprapubic tenderness+  UA dirty  CT with Cystitis with bilateral ascending ureteritis and possible pyelonephritis in the right kidney  Received 1G ceftriaxone in ED  will given additional 1g and continue ceftriaxone 2g daily  Follow up urine culture  Check blood culture ( received antibiotics)

## 2022-09-26 NOTE — H&P ADULT - PROBLEM SELECTOR PLAN 2
CT abd/pelvis image reviewed, severe right hydronephrosis and marked  parenchymal thinning likely due to chronic UPJ obstruction  Ultrasound in 08/2015 with normal kidney  Patient is not aware of this  ED consulted urology  Monitor renal function DC instructions

## 2022-09-26 NOTE — ED PROVIDER NOTE - PROGRESS NOTE DETAILS
W/u significant for: + mild leukocytosis to 12, UA w/ nitrite (+) infection, CT w/ + severe R hydronephrosis 2/2 chronic UPJ obstruction, cystitis w/ BL ascending urethritis, R pyelonephritis. D/w Uro-Surg, w/o stone or apparent mass, request admit to medicine, will follow. Pt admitted to medicine (d/w Dr Jimenez) Pt updated to results, admission. She understands / agrees w/ this plan.

## 2022-09-26 NOTE — ED ADULT TRIAGE NOTE - CHIEF COMPLAINT QUOTE
BIBA for RLQ pain started around 12 midnight, patient states had similar episode on thursday and was fine friday and saturday. patient states she feels like passing out, feels nauseous  hx of hypothyroid and hyperlipidemia

## 2022-09-27 LAB
ALBUMIN SERPL ELPH-MCNC: 2.4 G/DL — LOW (ref 3.3–5)
ALP SERPL-CCNC: 217 U/L — HIGH (ref 40–120)
ALT FLD-CCNC: 297 U/L — HIGH (ref 12–78)
ANION GAP SERPL CALC-SCNC: 3 MMOL/L — LOW (ref 5–17)
AST SERPL-CCNC: 185 U/L — HIGH (ref 15–37)
BILIRUB SERPL-MCNC: 0.4 MG/DL — SIGNIFICANT CHANGE UP (ref 0.2–1.2)
BUN SERPL-MCNC: 25 MG/DL — HIGH (ref 7–23)
CALCIUM SERPL-MCNC: 8.9 MG/DL — SIGNIFICANT CHANGE UP (ref 8.5–10.1)
CHLORIDE SERPL-SCNC: 108 MMOL/L — SIGNIFICANT CHANGE UP (ref 96–108)
CO2 SERPL-SCNC: 30 MMOL/L — SIGNIFICANT CHANGE UP (ref 22–31)
CREAT SERPL-MCNC: 0.99 MG/DL — SIGNIFICANT CHANGE UP (ref 0.5–1.3)
EGFR: 62 ML/MIN/1.73M2 — SIGNIFICANT CHANGE UP
GLUCOSE SERPL-MCNC: 109 MG/DL — HIGH (ref 70–99)
HCT VFR BLD CALC: 35.3 % — SIGNIFICANT CHANGE UP (ref 34.5–45)
HGB BLD-MCNC: 11.3 G/DL — LOW (ref 11.5–15.5)
MAGNESIUM SERPL-MCNC: 2.6 MG/DL — SIGNIFICANT CHANGE UP (ref 1.6–2.6)
MCHC RBC-ENTMCNC: 29.6 PG — SIGNIFICANT CHANGE UP (ref 27–34)
MCHC RBC-ENTMCNC: 32 G/DL — SIGNIFICANT CHANGE UP (ref 32–36)
MCV RBC AUTO: 92.4 FL — SIGNIFICANT CHANGE UP (ref 80–100)
NRBC # BLD: 0 /100 WBCS — SIGNIFICANT CHANGE UP (ref 0–0)
PHOSPHATE SERPL-MCNC: 3 MG/DL — SIGNIFICANT CHANGE UP (ref 2.5–4.5)
PLATELET # BLD AUTO: 162 K/UL — SIGNIFICANT CHANGE UP (ref 150–400)
POTASSIUM SERPL-MCNC: 4.5 MMOL/L — SIGNIFICANT CHANGE UP (ref 3.5–5.3)
POTASSIUM SERPL-SCNC: 4.5 MMOL/L — SIGNIFICANT CHANGE UP (ref 3.5–5.3)
PROT SERPL-MCNC: 6.1 GM/DL — SIGNIFICANT CHANGE UP (ref 6–8.3)
RBC # BLD: 3.82 M/UL — SIGNIFICANT CHANGE UP (ref 3.8–5.2)
RBC # FLD: 12.8 % — SIGNIFICANT CHANGE UP (ref 10.3–14.5)
SODIUM SERPL-SCNC: 141 MMOL/L — SIGNIFICANT CHANGE UP (ref 135–145)
WBC # BLD: 8.03 K/UL — SIGNIFICANT CHANGE UP (ref 3.8–10.5)
WBC # FLD AUTO: 8.03 K/UL — SIGNIFICANT CHANGE UP (ref 3.8–10.5)

## 2022-09-27 PROCEDURE — 99233 SBSQ HOSP IP/OBS HIGH 50: CPT

## 2022-09-27 RX ORDER — INFLUENZA VIRUS VACCINE 15; 15; 15; 15 UG/.5ML; UG/.5ML; UG/.5ML; UG/.5ML
0.7 SUSPENSION INTRAMUSCULAR ONCE
Refills: 0 | Status: DISCONTINUED | OUTPATIENT
Start: 2022-09-27 | End: 2022-09-29

## 2022-09-27 RX ADMIN — CEFTRIAXONE 100 MILLIGRAM(S): 500 INJECTION, POWDER, FOR SOLUTION INTRAMUSCULAR; INTRAVENOUS at 11:01

## 2022-09-27 RX ADMIN — SIMVASTATIN 20 MILLIGRAM(S): 20 TABLET, FILM COATED ORAL at 21:03

## 2022-09-27 RX ADMIN — Medication 175 MICROGRAM(S): at 05:23

## 2022-09-27 RX ADMIN — PANTOPRAZOLE SODIUM 40 MILLIGRAM(S): 20 TABLET, DELAYED RELEASE ORAL at 05:23

## 2022-09-27 RX ADMIN — ENOXAPARIN SODIUM 40 MILLIGRAM(S): 100 INJECTION SUBCUTANEOUS at 10:20

## 2022-09-27 RX ADMIN — ENOXAPARIN SODIUM 40 MILLIGRAM(S): 100 INJECTION SUBCUTANEOUS at 21:04

## 2022-09-27 NOTE — PATIENT PROFILE ADULT - DO YOU NEED ADDITIONAL SERVICES TO MANAGE ANY OF THESE MEDICAL CONDITIONS AT HOME?
Pt called in to f/u on her refill request.  Pt stated that she only has 1 pill left.   Please advise no

## 2022-09-27 NOTE — PROGRESS NOTE ADULT - SUBJECTIVE AND OBJECTIVE BOX
[facility] NEPHROLOGY SERVICES, [facility]  NEPHROLOGY AND HYPERTENSION  [address]  [address]  [place] [address]  [phone]    MD WALDO BAXTER, MD AMBIKA RIVERA, MD JANUSZ SINCLAIR, MD PARMINDER MELENDREZ, MD ZIA HARRIS MD          Patient events noted  no distress    MEDICATIONS  (STANDING):  cefTRIAXone   IVPB 2000 milliGRAM(s) IV Intermittent every 24 hours  enoxaparin Injectable 40 milliGRAM(s) SubCutaneous every 12 hours  influenza  Vaccine (HIGH DOSE) 0.7 milliLiter(s) IntraMuscular once  levothyroxine 175 MICROGram(s) Oral daily  pantoprazole    Tablet 40 milliGRAM(s) Oral before breakfast  simvastatin 20 milliGRAM(s) Oral at bedtime    MEDICATIONS  (PRN):  acetaminophen     Tablet .. 650 milliGRAM(s) Oral every 6 hours PRN Temp greater or equal to 38C (100.4F), Mild Pain (1 - 3), Moderate Pain (4 - 6)  melatonin 3 milliGRAM(s) Oral at bedtime PRN Insomnia  ondansetron Injectable 4 milliGRAM(s) IV Push every 8 hours PRN Nausea and/or Vomiting      09-26-22 @ 07:01  -  09-27-22 @ 07:00  --------------------------------------------------------  IN: 0 mL / OUT: 300 mL / NET: -300 mL    09-27-22 @ 07:01  -  09-27-22 @ 22:01  --------------------------------------------------------  IN: 300 mL / OUT: 0 mL / NET: 300 mL      PHYSICAL EXAM:      T(C): 36.1 (09-27-22 @ 16:35), Max: 36.8 (09-27-22 @ 00:03)  HR: 80 (09-27-22 @ 16:35) (68 - 80)  BP: 144/80 (09-27-22 @ 16:35) (117/70 - 144/80)  RR: 18 (09-27-22 @ 16:35) (17 - 18)  SpO2: 95% (09-27-22 @ 16:35) (95% - 97%)  Wt(kg): --  Lungs clear  Heart S1S2  Abd soft NT ND  Extremities:   tr edema                                    11.3   8.03  )-----------( 162      ( 27 Sep 2022 07:38 )             35.3     09-27    141  |  108  |  25<H>  ----------------------------<  109<H>  4.5   |  30  |  0.99    Ca    8.9      27 Sep 2022 07:38  Phos  3.0     09-27  Mg     2.6     09-27    TPro  6.1  /  Alb  2.4<L>  /  TBili  0.4  /  DBili  x   /  AST  185<H>  /  ALT  297<H>  /  AlkPhos  217<H>  09-27      LIVER FUNCTIONS - ( 27 Sep 2022 07:38 )  Alb: 2.4 g/dL / Pro: 6.1 gm/dL / ALK PHOS: 217 U/L / ALT: 297 U/L / AST: 185 U/L / GGT: x           Creatinine Trend: 0.99<--, 1.20<--        Assessment   ELLI post renal; risk for infectious AIN;   Stable     Plan    Empiric IV abx  Maintenance IVF          Victorino Stein MD

## 2022-09-27 NOTE — PROGRESS NOTE ADULT - ASSESSMENT
66 years old female with h/o hypothyroidism, HLD, morbid obesity present to ED with complain of abd pain radiating to right flank. Pain started on Thur, intermittent, 10/10 " severe pain", associated with chills and sweating. No dysuria, frequency or hematuria. No h/o kidney stones or frequent UTI  Hemodynamically stable, afebrile, sat well at RA. CXR image reviewed, no focal consolidation. WBC 12.74,Plt 195, K 4, Cr 1.2, lactate 1.4. UA appear dirty. CT abd/pelvis image reviewed, severe right hydronephrosis and marked  parenchymal thinning likely due to chronic UPJ obstruction. Cystitis with bilateral ascending ureteritis and possible pyelonephritis in the right kidney      Admitted with acute pyelonephritis, severe right hydronephrosis    Problem/Plan - 1:  ·  Problem: Acute pyelonephritis.   ·  Plan: right flank pain, CVA tenderness +, suprapubic tenderness+  UA dirty  CT with Cystitis with bilateral ascending ureteritis and possible pyelonephritis in the right kidney  Received 1G ceftriaxone in ED  will given additional 1g and continue ceftriaxone 2g daily  Follow up urine culture  Check blood culture ( received antibiotics).    Problem/Plan - 2:  ·  Problem: Hydronephrosis, right 2/2 pyelonephritis  ·  Plan: CT abd/pelvis image reviewed, severe right hydronephrosis and marked  parenchymal thinning likely due to chronic UPJ obstruction  Ultrasound in 08/2015 with normal kidney  Patient is not aware of this  ED consulted urology  Monitor renal function.    Problem/Plan - 3:  ·  Problem: ELLI (acute kidney injury).   ·  Plan: Cr 1.2 ( no recent Cr in system, last in 2020 was 1)  ? due to infectious etiology vs UPJ obstruction of right kidney  Monitor renal function, avoid nephrotoxic substances  Nephrology consulted.    Problem/Plan - 4:  ·  Problem: Hypothyroidism, unspecified.   ·  Plan: continue synthroid 175mcg.    Problem/Plan - 5:  ·  Problem: Hyperlipidemia, unspecified.   ·  Plan: continue simvastatin 20mg hs.    Problem/Plan - 6:  ·  Problem: Morbid obesity.   ·  Plan: weight loss and lifestyle modification.   66 years old female with h/o hypothyroidism, HLD, morbid obesity present to ED with complain of abd pain radiating to right flank. Pain started on Thur, intermittent, 10/10 " severe pain", associated with chills and sweating. No dysuria, frequency or hematuria. No h/o kidney stones or frequent UTI  Hemodynamically stable, afebrile, sat well at RA. CXR image reviewed, no focal consolidation. WBC 12.74,Plt 195, K 4, Cr 1.2, lactate 1.4. UA appear dirty. CT abd/pelvis image reviewed, severe right hydronephrosis and marked  parenchymal thinning likely due to chronic UPJ obstruction. Cystitis with bilateral ascending ureteritis and possible pyelonephritis in the right kidney      Admitted with acute pyelonephritis, severe right hydronephrosis    Problem/Plan - 1:  ·  Problem: Acute pyelonephritis.   ·  Plan: right flank pain, CVA tenderness +, suprapubic tenderness+  CT with Cystitis with bilateral ascending ureteritis and possible pyelonephritis in the right kidney  (9/27) Continue ceftriaxone 2g daily  Follow up urine culture  F/U blood cultures     Problem/Plan - 2:  ·  Problem: Hydronephrosis, right 2/2 pyelonephritis  ·  Plan: CT abd/pelvis image reviewed, severe right hydronephrosis and marked  parenchymal thinning likely due to chronic UPJ obstruction  Ultrasound in 08/2015 with normal kidney  F/U urology  Monitor renal function.    Problem/Plan - 3:  ·  Problem: ELLI (acute kidney injury).   ·  Plan: Cr 1.2 ( no recent Cr in system, last in 2020 was 1)  ? due to infectious etiology vs UPJ obstruction of right kidney  Monitor renal function, avoid nephrotoxic substances  Nephrology consulted.    Problem/Plan - 4:  ·  Problem: Hypothyroidism, unspecified.   ·  Plan: continue synthroid 175mcg.    Problem/Plan - 5:  ·  Problem: Hyperlipidemia, unspecified.   ·  Plan: continue simvastatin 20mg hs.    Problem/Plan - 6:  ·  Problem: Morbid obesity.   ·  Plan: weight loss and lifestyle modification.

## 2022-09-27 NOTE — PROGRESS NOTE ADULT - SUBJECTIVE AND OBJECTIVE BOX
Patient is a 66y old  Female who presents with a chief complaint of right pyelonephritis, severe right hydronephrosis (26 Sep 2022 10:44)    INTERVAL HPI/OVERNIGHT EVENTS: Patients seen and examined at bedside this morning. No acute events overnight. Pt reports her urine is becoming light yellow. Denies low back pain. Denies dysuria/frequency.    MEDICATIONS  (STANDING):  cefTRIAXone   IVPB 2000 milliGRAM(s) IV Intermittent every 24 hours  enoxaparin Injectable 40 milliGRAM(s) SubCutaneous every 12 hours  levothyroxine 175 MICROGram(s) Oral daily  pantoprazole    Tablet 40 milliGRAM(s) Oral before breakfast  simvastatin 20 milliGRAM(s) Oral at bedtime    MEDICATIONS  (PRN):  acetaminophen     Tablet .. 650 milliGRAM(s) Oral every 6 hours PRN Temp greater or equal to 38C (100.4F), Mild Pain (1 - 3), Moderate Pain (4 - 6)  melatonin 3 milliGRAM(s) Oral at bedtime PRN Insomnia  ondansetron Injectable 4 milliGRAM(s) IV Push every 8 hours PRN Nausea and/or Vomiting    Allergies    No Known Allergies    Intolerances      REVIEW OF SYSTEMS:  All other systems reviewed and are negative    Vital Signs Last 24 Hrs  T(C): 36.7 (27 Sep 2022 10:56), Max: 37 (26 Sep 2022 17:12)  T(F): 98 (27 Sep 2022 10:56), Max: 98.6 (26 Sep 2022 17:12)  HR: 68 (27 Sep 2022 10:56) (68 - 78)  BP: 118/66 (27 Sep 2022 10:56) (103/71 - 133/75)  BP(mean): --  RR: 17 (27 Sep 2022 10:56) (14 - 18)  SpO2: 97% (27 Sep 2022 10:56) (93% - 99%)    Parameters below as of 27 Sep 2022 10:56  Patient On (Oxygen Delivery Method): room air      Daily     Daily Weight in k.6 (27 Sep 2022 05:27)  I&O's Summary    26 Sep 2022 07:01  -  27 Sep 2022 07:00  --------------------------------------------------------  IN: 0 mL / OUT: 300 mL / NET: -300 mL      CAPILLARY BLOOD GLUCOSE        PHYSICAL EXAM:  GENERAL: NAD, well-groomed, well-developed  HEAD:  Atraumatic, Normocephalic  EYES: EOMI, PERRLA, conjunctiva and sclera clear  ENMT: No tonsillar erythema, exudates, or enlargement; Moist mucous membranes, Good dentition, No lesions  NECK: Supple, No JVD, Normal thyroid  NERVOUS SYSTEM:  Alert & Oriented X3, Good concentration; Motor Strength 5/5 B/L upper and lower extremities; DTRs 2+ intact and symmetric  CHEST/LUNG: Clear to percussion bilaterally; No rales, rhonchi, wheezing, or rubs  HEART: Regular rate and rhythm; No murmurs, rubs, or gallops  ABDOMEN: Soft, Nontender, Nondistended; Bowel sounds present  EXTREMITIES:  2+ Peripheral Pulses, No clubbing, cyanosis, or edema  LYMPH: No lymphadenopathy noted  SKIN: No rashes or lesions    Labs                          11.3   8.03  )-----------( 162      ( 27 Sep 2022 07:38 )             35.3         141  |  108  |  25<H>  ----------------------------<  109<H>  4.5   |  30  |  0.99    Ca    8.9      27 Sep 2022 07:38  Phos  3.0       Mg     2.6         TPro  6.1  /  Alb  2.4<L>  /  TBili  0.4  /  DBili  x   /  AST  185<H>  /  ALT  297<H>  /  AlkPhos  217<H>            Urinalysis Basic - ( 26 Sep 2022 05:26 )    Color: Yellow / Appearance: very cloudy / S.010 / pH: x  Gluc: x / Ketone: Negative  / Bili: Negative / Urobili: Negative mg/dL   Blood: x / Protein: 500 mg/dL / Nitrite: Positive   Leuk Esterase: Moderate / RBC: >50 /HPF / WBC >50   Sq Epi: x / Non Sq Epi: Occasional / Bacteria: Many

## 2022-09-27 NOTE — PATIENT PROFILE ADULT - FALL HARM RISK - UNIVERSAL INTERVENTIONS
Bed in lowest position, wheels locked, appropriate side rails in place/Call bell, personal items and telephone in reach/Instruct patient to call for assistance before getting out of bed or chair/Non-slip footwear when patient is out of bed/Friendly to call system/Physically safe environment - no spills, clutter or unnecessary equipment/Purposeful Proactive Rounding/Room/bathroom lighting operational, light cord in reach

## 2022-09-28 LAB
-  AMIKACIN: SIGNIFICANT CHANGE UP
-  AMOXICILLIN/CLAVULANIC ACID: SIGNIFICANT CHANGE UP
-  AMPICILLIN/SULBACTAM: SIGNIFICANT CHANGE UP
-  AMPICILLIN: SIGNIFICANT CHANGE UP
-  AZTREONAM: SIGNIFICANT CHANGE UP
-  CEFAZOLIN: SIGNIFICANT CHANGE UP
-  CEFEPIME: SIGNIFICANT CHANGE UP
-  CEFOXITIN: SIGNIFICANT CHANGE UP
-  CEFTRIAXONE: SIGNIFICANT CHANGE UP
-  CIPROFLOXACIN: SIGNIFICANT CHANGE UP
-  ERTAPENEM: SIGNIFICANT CHANGE UP
-  GENTAMICIN: SIGNIFICANT CHANGE UP
-  IMIPENEM: SIGNIFICANT CHANGE UP
-  LEVOFLOXACIN: SIGNIFICANT CHANGE UP
-  MEROPENEM: SIGNIFICANT CHANGE UP
-  NITROFURANTOIN: SIGNIFICANT CHANGE UP
-  PIPERACILLIN/TAZOBACTAM: SIGNIFICANT CHANGE UP
-  TIGECYCLINE: SIGNIFICANT CHANGE UP
-  TOBRAMYCIN: SIGNIFICANT CHANGE UP
-  TRIMETHOPRIM/SULFAMETHOXAZOLE: SIGNIFICANT CHANGE UP
ALBUMIN SERPL ELPH-MCNC: 2.6 G/DL — LOW (ref 3.3–5)
ALP SERPL-CCNC: 202 U/L — HIGH (ref 40–120)
ALT FLD-CCNC: 195 U/L — HIGH (ref 12–78)
ANION GAP SERPL CALC-SCNC: 5 MMOL/L — SIGNIFICANT CHANGE UP (ref 5–17)
AST SERPL-CCNC: 57 U/L — HIGH (ref 15–37)
BILIRUB DIRECT SERPL-MCNC: 0.1 MG/DL — SIGNIFICANT CHANGE UP (ref 0–0.3)
BILIRUB INDIRECT FLD-MCNC: 0.1 MG/DL — LOW (ref 0.2–1)
BILIRUB SERPL-MCNC: 0.2 MG/DL — SIGNIFICANT CHANGE UP (ref 0.2–1.2)
BUN SERPL-MCNC: 23 MG/DL — SIGNIFICANT CHANGE UP (ref 7–23)
CALCIUM SERPL-MCNC: 9.2 MG/DL — SIGNIFICANT CHANGE UP (ref 8.5–10.1)
CHLORIDE SERPL-SCNC: 108 MMOL/L — SIGNIFICANT CHANGE UP (ref 96–108)
CO2 SERPL-SCNC: 29 MMOL/L — SIGNIFICANT CHANGE UP (ref 22–31)
CREAT SERPL-MCNC: 0.97 MG/DL — SIGNIFICANT CHANGE UP (ref 0.5–1.3)
CULTURE RESULTS: SIGNIFICANT CHANGE UP
EGFR: 64 ML/MIN/1.73M2 — SIGNIFICANT CHANGE UP
GLUCOSE SERPL-MCNC: 117 MG/DL — HIGH (ref 70–99)
HCT VFR BLD CALC: 36.6 % — SIGNIFICANT CHANGE UP (ref 34.5–45)
HGB BLD-MCNC: 11.3 G/DL — LOW (ref 11.5–15.5)
LIDOCAIN IGE QN: 140 U/L — SIGNIFICANT CHANGE UP (ref 73–393)
MCHC RBC-ENTMCNC: 28.9 PG — SIGNIFICANT CHANGE UP (ref 27–34)
MCHC RBC-ENTMCNC: 30.9 G/DL — LOW (ref 32–36)
MCV RBC AUTO: 93.6 FL — SIGNIFICANT CHANGE UP (ref 80–100)
METHOD TYPE: SIGNIFICANT CHANGE UP
NRBC # BLD: 0 /100 WBCS — SIGNIFICANT CHANGE UP (ref 0–0)
ORGANISM # SPEC MICROSCOPIC CNT: SIGNIFICANT CHANGE UP
ORGANISM # SPEC MICROSCOPIC CNT: SIGNIFICANT CHANGE UP
PLATELET # BLD AUTO: 200 K/UL — SIGNIFICANT CHANGE UP (ref 150–400)
POTASSIUM SERPL-MCNC: 4.7 MMOL/L — SIGNIFICANT CHANGE UP (ref 3.5–5.3)
POTASSIUM SERPL-SCNC: 4.7 MMOL/L — SIGNIFICANT CHANGE UP (ref 3.5–5.3)
PROT SERPL-MCNC: 6.4 GM/DL — SIGNIFICANT CHANGE UP (ref 6–8.3)
RBC # BLD: 3.91 M/UL — SIGNIFICANT CHANGE UP (ref 3.8–5.2)
RBC # FLD: 12.7 % — SIGNIFICANT CHANGE UP (ref 10.3–14.5)
SODIUM SERPL-SCNC: 142 MMOL/L — SIGNIFICANT CHANGE UP (ref 135–145)
SPECIMEN SOURCE: SIGNIFICANT CHANGE UP
WBC # BLD: 9.73 K/UL — SIGNIFICANT CHANGE UP (ref 3.8–10.5)
WBC # FLD AUTO: 9.73 K/UL — SIGNIFICANT CHANGE UP (ref 3.8–10.5)

## 2022-09-28 PROCEDURE — 76700 US EXAM ABDOM COMPLETE: CPT | Mod: 26

## 2022-09-28 PROCEDURE — 99233 SBSQ HOSP IP/OBS HIGH 50: CPT | Mod: FS

## 2022-09-28 PROCEDURE — 99233 SBSQ HOSP IP/OBS HIGH 50: CPT

## 2022-09-28 PROCEDURE — 99222 1ST HOSP IP/OBS MODERATE 55: CPT

## 2022-09-28 PROCEDURE — 93010 ELECTROCARDIOGRAM REPORT: CPT

## 2022-09-28 RX ORDER — MAGNESIUM HYDROXIDE 400 MG/1
30 TABLET, CHEWABLE ORAL DAILY
Refills: 0 | Status: DISCONTINUED | OUTPATIENT
Start: 2022-09-28 | End: 2022-09-29

## 2022-09-28 RX ORDER — SENNA PLUS 8.6 MG/1
2 TABLET ORAL AT BEDTIME
Refills: 0 | Status: DISCONTINUED | OUTPATIENT
Start: 2022-09-28 | End: 2022-09-29

## 2022-09-28 RX ADMIN — ENOXAPARIN SODIUM 40 MILLIGRAM(S): 100 INJECTION SUBCUTANEOUS at 21:02

## 2022-09-28 RX ADMIN — MAGNESIUM HYDROXIDE 30 MILLILITER(S): 400 TABLET, CHEWABLE ORAL at 12:55

## 2022-09-28 RX ADMIN — CEFTRIAXONE 100 MILLIGRAM(S): 500 INJECTION, POWDER, FOR SOLUTION INTRAMUSCULAR; INTRAVENOUS at 12:54

## 2022-09-28 RX ADMIN — ENOXAPARIN SODIUM 40 MILLIGRAM(S): 100 INJECTION SUBCUTANEOUS at 11:58

## 2022-09-28 RX ADMIN — Medication 3 MILLIGRAM(S): at 21:29

## 2022-09-28 RX ADMIN — SENNA PLUS 2 TABLET(S): 8.6 TABLET ORAL at 21:02

## 2022-09-28 RX ADMIN — Medication 175 MICROGRAM(S): at 05:11

## 2022-09-28 RX ADMIN — PANTOPRAZOLE SODIUM 40 MILLIGRAM(S): 20 TABLET, DELAYED RELEASE ORAL at 05:12

## 2022-09-28 NOTE — CONSULT NOTE ADULT - NS ATTEND AMEND GEN_ALL_CORE FT
per above    no h/o pain referable to right kidney    currently asymptomatic    c/s positive-rx po atb    normal creat    abnl LFT's      urologically can be d/c'd for outpt f/u with lasix renogram-?L kidney with early hydro    Right kidney unlikely to have significant function    reviewed with pt at length

## 2022-09-28 NOTE — CONSULT NOTE ADULT - ASSESSMENT
66F presenting with pyleo, found to have chronic R UPJ obstruction on CT    - cont abx  - trend Cr  - no acute  intervention necessary at this time  - medical mgmt for elevated LFTs  - pt will need outpatient renal lasix scan  seen and examined with urology attending, JEANMARIE Musa MD  
66 years old female with h/o hypothyroidism, HLD, morbid obesity present to ED with complain of abd pain radiating to right flank.   In the ED, Hemodynamically stable, afebrile, sat well at [facility].   CXR (I personally reviewed) , no focal consolidation  WBC 12.74,Plt 195, K 4, Cr 1.2, lactate 1.4.   UA positive with symptoms   CT abd/pelvis (I personally reviewed the images of this CT) , severe right hydronephrosis and marked  parenchymal thinning likely due to chronic UPJ obstruction. Cystitis with bilateral ascending ureteritis and possible pyelonephritis in the right kidney  Admitted with acute pyelonephritis, severe right hydronephrosis    pyelonephritis  transaminitis   Hydroureteronephrosis   Morbid obesity     Plan:  continue ceftriaxone  monitor LFTs  if tomorrow the LFTs are trending up will stop ceftriaxone and change to a different class of antibiotics   would also get GI if LFTs continue to rise   follow all cultures   follow urology recommendations   diet and exercise counselling   weight loss counselling very important for long term health benefits   consider referal to Bariatric surgery program here at [facility]    Discussed with Dr. Jarad Padilla, DO  Infectious Disease Attending  Reachable via Microsoft Teams or [place] office: [phone]  After 5pm/weekends please call [phone] for all inquiries and new consults

## 2022-09-28 NOTE — DIETITIAN INITIAL EVALUATION ADULT - PROBLEM/PLAN-6
Include Z78.9 (Other Specified Conditions Influencing Health Status) As An Associated Diagnosis?: No DISPLAY PLAN FREE TEXT

## 2022-09-28 NOTE — PROGRESS NOTE ADULT - SUBJECTIVE AND OBJECTIVE BOX
Patient is a 66y old  Female who presents with a chief complaint of right pyelonephritis, severe right hydronephrosis (26 Sep 2022 10:44)    INTERVAL HPI/OVERNIGHT EVENTS:   Patient seen and examined bedside.   no overnight events   denies pain  complaining of constipation     MEDICATIONS  (STANDING):  cefTRIAXone   IVPB 2000 milliGRAM(s) IV Intermittent every 24 hours  enoxaparin Injectable 40 milliGRAM(s) SubCutaneous every 12 hours  levothyroxine 175 MICROGram(s) Oral daily  pantoprazole    Tablet 40 milliGRAM(s) Oral before breakfast  simvastatin 20 milliGRAM(s) Oral at bedtime    MEDICATIONS  (PRN):  acetaminophen     Tablet .. 650 milliGRAM(s) Oral every 6 hours PRN Temp greater or equal to 38C (100.4F), Mild Pain (1 - 3), Moderate Pain (4 - 6)  melatonin 3 milliGRAM(s) Oral at bedtime PRN Insomnia  ondansetron Injectable 4 milliGRAM(s) IV Push every 8 hours PRN Nausea and/or Vomiting    Allergies    No Known Allergies    Intolerances      REVIEW OF SYSTEMS:  All other systems reviewed and are negative    Vital Signs Last 24 Hrs  T(C): 36.7 (27 Sep 2022 10:56), Max: 37 (26 Sep 2022 17:12)  T(F): 98 (27 Sep 2022 10:56), Max: 98.6 (26 Sep 2022 17:12)  HR: 68 (27 Sep 2022 10:56) (68 - 78)  BP: 118/66 (27 Sep 2022 10:56) (103/71 - 133/75)  BP(mean): --  RR: 17 (27 Sep 2022 10:56) (14 - 18)  SpO2: 97% (27 Sep 2022 10:56) (93% - 99%)    Parameters below as of 27 Sep 2022 10:56  Patient On (Oxygen Delivery Method): room air      Daily     Daily Weight in k.6 (27 Sep 2022 05:27)  I&O's Summary    26 Sep 2022 07:01  -  27 Sep 2022 07:00  --------------------------------------------------------  IN: 0 mL / OUT: 300 mL / NET: -300 mL      CAPILLARY BLOOD GLUCOSE        PHYSICAL EXAM:  GENERAL: NAD, well-groomed, well-developed  HEAD:  Atraumatic, Normocephalic  EYES: EOMI, PERRLA, conjunctiva and sclera clear  ENMT: No tonsillar erythema, exudates, or enlargement; Moist mucous membranes, Good dentition, No lesions  NECK: Supple, No JVD, Normal thyroid  NERVOUS SYSTEM:  Alert & Oriented X3, Good concentration; Motor Strength 5/5 B/L upper and lower extremities; DTRs 2+ intact and symmetric  CHEST/LUNG: Clear to percussion bilaterally; No rales, rhonchi, wheezing, or rubs  HEART: Regular rate and rhythm; No murmurs, rubs, or gallops  ABDOMEN: Soft, Nontender, Nondistended; Bowel sounds present  EXTREMITIES:  2+ Peripheral Pulses, No clubbing, cyanosis, or edema  LYMPH: No lymphadenopathy noted  SKIN: No rashes or lesions    Labs                          11.3   8.03  )-----------( 162      ( 27 Sep 2022 07:38 )             35.3         141  |  108  |  25<H>  ----------------------------<  109<H>  4.5   |  30  |  0.99    Ca    8.9      27 Sep 2022 07:38  Phos  3.0       Mg     2.6         TPro  6.1  /  Alb  2.4<L>  /  TBili  0.4  /  DBili  x   /  AST  185<H>  /  ALT  297<H>  /  AlkPhos  217<H>            Urinalysis Basic - ( 26 Sep 2022 05:26 )    Color: Yellow / Appearance: very cloudy / S.010 / pH: x  Gluc: x / Ketone: Negative  / Bili: Negative / Urobili: Negative mg/dL   Blood: x / Protein: 500 mg/dL / Nitrite: Positive   Leuk Esterase: Moderate / RBC: >50 /HPF / WBC >50   Sq Epi: x / Non Sq Epi: Occasional / Bacteria: Many                   Patient is a 66y old  Female who presents with a chief complaint of right pyelonephritis, severe right hydronephrosis (26 Sep 2022 10:44)    INTERVAL HPI/OVERNIGHT EVENTS:   Patient seen and examined bedside.   no overnight events   complaining of epigastric and RUQ pain   complaining of constipation   complaining of belching     REVIEW OF SYSTEMS: remaining ROS negative       MEDICATIONS  (STANDING):  cefTRIAXone   IVPB 2000 milliGRAM(s) IV Intermittent every 24 hours  enoxaparin Injectable 40 milliGRAM(s) SubCutaneous every 12 hours  levothyroxine 175 MICROGram(s) Oral daily  pantoprazole    Tablet 40 milliGRAM(s) Oral before breakfast  simvastatin 20 milliGRAM(s) Oral at bedtime    MEDICATIONS  (PRN):  acetaminophen     Tablet .. 650 milliGRAM(s) Oral every 6 hours PRN Temp greater or equal to 38C (100.4F), Mild Pain (1 - 3), Moderate Pain (4 - 6)  melatonin 3 milliGRAM(s) Oral at bedtime PRN Insomnia  ondansetron Injectable 4 milliGRAM(s) IV Push every 8 hours PRN Nausea and/or Vomiting    Allergies    No Known Allergies    Intolerances      REVIEW OF SYSTEMS:  All other systems reviewed and are negative    Vital Signs Last 24 Hrs  T(C): 36.5 (28 Sep 2022 05:05), Max: 36.6 (27 Sep 2022 23:56)  T(F): 97.7 (28 Sep 2022 05:05), Max: 97.8 (27 Sep 2022 23:56)  HR: 61 (28 Sep 2022 05:05) (61 - 80)  BP: 133/67 (28 Sep 2022 05:05) (120/69 - 144/80)  BP(mean): --  RR: 18 (28 Sep 2022 05:05) (18 - 18)  SpO2: 96% (28 Sep 2022 05:05) (95% - 97%)    Parameters below as of 27 Sep 2022 16:35  Patient On (Oxygen Delivery Method): room air              PHYSICAL EXAM:  GENERAL: NAD, obese, no increased WOB   HEAD:  Atraumatic, Normocephalic  EYES: EOMI, PERRLA, conjunctiva and sclera clear  ENMT: No tonsillar erythema, exudates, or enlargement; Moist mucous membranes   NECK: Supple, No JVD   NERVOUS SYSTEM:  Alert & Oriented X3, Good concentration; nonfocal   CHEST/LUNG: CTAB  HEART: Regular rate and rhythm; No murmurs, rubs, or gallops  ABDOMEN: Soft, epigastric and RUQ tenderness,  Nondistended; Bowel sounds present  EXTREMITIES:  2+ Peripheral Pulses b/l, No clubbing, cyanosis, calf tenderness or edema b/l       Labs                          11.3   9.73  )-----------( 200      ( 28 Sep 2022 06:10 )             36.6       142  |  108  |  23  ----------------------------<  117<H>  4.7   |  29  |  0.97    Ca    9.2      28 Sep 2022 06:10  Phos  3.0       Mg     2.6         TPro  6.1  /  Alb  2.4<L>  /  TBili  0.4  /  DBili  x   /  AST  185<H>  /  ALT  297<H>  /  AlkPhos  217<H>          Urinalysis Basic - ( 26 Sep 2022 05:26 )    Color: Yellow / Appearance: very cloudy / S.010 / pH: x  Gluc: x / Ketone: Negative  / Bili: Negative / Urobili: Negative mg/dL   Blood: x / Protein: 500 mg/dL / Nitrite: Positive   Leuk Esterase: Moderate / RBC: >50 /HPF / WBC >50   Sq Epi: x / Non Sq Epi: Occasional / Bacteria: Many            Consultant(s) Notes Reveiwed [x] Yes     Care Discussed with [x ] Consultants  [ x] Patient  [ ] Family  [x ] /   [ x] Other; RN  Care plan and all findings were discussed in detail with patient.  All questions and concerns addressed

## 2022-09-28 NOTE — DIETITIAN INITIAL EVALUATION ADULT - PERTINENT LABORATORY DATA
09-28    142  |  108  |  23  ----------------------------<  117<H>  4.7   |  29  |  0.97    Ca    9.2      28 Sep 2022 06:10  Phos  3.0     09-27  Mg     2.6     09-27    TPro  6.4  /  Alb  2.6<L>  /  TBili  0.2  /  DBili  0.1  /  AST  57<H>  /  ALT  195<H>  /  AlkPhos  202<H>  09-28

## 2022-09-28 NOTE — PROGRESS NOTE ADULT - TIME BILLING
min spent reviewing chart, examining patient, discussing plan with patient and family and staff, writing progress note and placing orders.

## 2022-09-28 NOTE — DIETITIAN INITIAL EVALUATION ADULT - PERTINENT MEDS FT
MEDICATIONS  (STANDING):  cefTRIAXone   IVPB 2000 milliGRAM(s) IV Intermittent every 24 hours  enoxaparin Injectable 40 milliGRAM(s) SubCutaneous every 12 hours  influenza  Vaccine (HIGH DOSE) 0.7 milliLiter(s) IntraMuscular once  levothyroxine 175 MICROGram(s) Oral daily  pantoprazole    Tablet 40 milliGRAM(s) Oral before breakfast  senna 2 Tablet(s) Oral at bedtime    MEDICATIONS  (PRN):  acetaminophen     Tablet .. 650 milliGRAM(s) Oral every 6 hours PRN Temp greater or equal to 38C (100.4F), Mild Pain (1 - 3), Moderate Pain (4 - 6)  aluminum hydroxide/magnesium hydroxide/simethicone Suspension 30 milliLiter(s) Oral every 4 hours PRN Dyspepsia  magnesium hydroxide Suspension 30 milliLiter(s) Oral daily PRN Constipation  melatonin 3 milliGRAM(s) Oral at bedtime PRN Insomnia  ondansetron Injectable 4 milliGRAM(s) IV Push every 8 hours PRN Nausea and/or Vomiting

## 2022-09-28 NOTE — CONSULT NOTE ADULT - SUBJECTIVE AND OBJECTIVE BOX
[facility]  INFECTIOUS DISEASES   [address] [place] [address]  Tel: [phone]     Fax: [phone]  ======================================================  Martinez MD Jonathan Garellek, DO Siria Gee, MEGHA   ======================================================    DOMITILA SHAHID  MRN-[id number]        Patient is a 66y old  Female who presents with a chief complaint of right pyelonephritis, severe right hydronephrosis (28 Sep 2022 16:09)      HPI:  66 years old female with h/o hypothyroidism, HLD, morbid obesity present to ED with complain of abd pain radiating to right flank. Pain started on Thur, intermittent, 10/10 " severe pain", associated with chills and sweating. No dysuria, frequency or hematuria. No h/o kidney stones or frequent UTI  Hemodynamically stable, afebrile, sat well at RA. CXR image reviewed, no focal consolidation. WBC 12.74,Plt 195, K 4, Cr 1.2, lactate 1.4. UA appear dirty. CT abd/pelvis image reviewed, severe right hydronephrosis and marked  parenchymal thinning likely due to chronic UPJ obstruction. Cystitis with bilateral ascending ureteritis and possible pyelonephritis in the right kidney    SH: no toxic habits  FH: no family h/o HTN, DM (26 Sep 2022 09:37)      ID consulted for workup and antibiotic management     PAST MEDICAL & SURGICAL HISTORY:  Hypothyroid      Insomnia      Dyslipidemia      Asthma      S/P  section      S/P lumbar laminectomy      Cataract  RIGHT  EYE 2010          Allergies  No Known Allergies        ANTIMICROBIALS:  cefTRIAXone   IVPB 2000 every 24 hours      MEDICATIONS  (STANDING):  cefTRIAXone   IVPB   100 mL/Hr IV Intermittent (22 @ 05:46)    cefTRIAXone   IVPB   100 mL/Hr IV Intermittent (22 @ 12:54)   100 mL/Hr IV Intermittent (22 @ 11:01)    cefTRIAXone   IVPB   100 mL/Hr IV Intermittent (22 @ 09:43)        OTHER MEDS: MEDICATIONS  (STANDING):  acetaminophen     Tablet .. 650 every 6 hours PRN  aluminum hydroxide/magnesium hydroxide/simethicone Suspension 30 every 4 hours PRN  enoxaparin Injectable 40 every 12 hours  influenza  Vaccine (HIGH DOSE) 0.7 once  levothyroxine 175 daily  magnesium hydroxide Suspension 30 daily PRN  melatonin 3 at bedtime PRN  ondansetron Injectable 4 every 8 hours PRN  pantoprazole    Tablet 40 before breakfast  senna 2 at bedtime      SOCIAL HISTORY:       FAMILY HISTORY:  No pertinent family history in first degree relatives        REVIEW OF SYSTEMS  [  ] ROS unobtainable because:    [  ] All other systems negative except as noted below:	    Constitutional:  [ ] fever [ ] chills  [ ] weight loss  [ ] weakness  Skin:  [ ] rash [ ] phlebitis	  Eyes: [ ] icterus [ ] pain  [ ] discharge	  ENMT: [ ] sore throat  [ ] thrush [ ] ulcers [ ] exudates  Respiratory: [ ] dyspnea [ ] hemoptysis [ ] cough [ ] sputum	  Cardiovascular:  [ ] chest pain [ ] palpitations [ ] edema	  Gastrointestinal:  [ ] nausea [ ] vomiting [ ] diarrhea [ ] constipation [ ] pain	  Genitourinary:  [ ] dysuria [ ] frequency [ ] hematuria [ ] discharge [ ] flank pain  [ ] incontinence  Musculoskeletal:  [ ] myalgias [ ] arthralgias [ ] arthritis  [ ] back pain  Neurological:  [ ] headache [ ] seizures  [ ] confusion/altered mental status  Psychiatric:  [ ] anxiety [ ] depression	  Hematology/Lymphatics:  [ ] lymphadenopathy  Endocrine:  [ ] adrenal [ ] thyroid  Allergic/Immunologic:	 [ ] transplant [ ] seasonal    Vital Signs Last 24 Hrs  T(F): 97.7 (22 @ 05:05), Max: 98.9 (22 @ 02:16)    Vital Signs Last 24 Hrs  HR: 61 (22 @ 05:05) (61 - 70)  BP: 133/67 (22 @ 05:05) (120/69 - 133/67)  RR: 18 (22 @ 05:05)  SpO2: 96% (22 @ 05:05) (96% - 97%)  Wt(kg): --    PHYSICAL EXAM:  Constitutional: non-toxic, no distress  HEAD/EYES: anicteric, no conjunctival injection  ENT:  supple, no thrush  Cardiovascular:   normal S1, S2, no murmur, no edema  Respiratory:  clear BS bilaterally, no wheezes, no rales  GI:  soft, non-tender, normal bowel sounds  :  no baez, no CVA tenderness  Musculoskeletal:  no synovitis, normal ROM  Neurologic: awake and alert, normal strength, no focal findings  Skin:  no rash, no erythema, no phlebitis  Heme/Onc: no lymphadenopathy   Psychiatric:  awake, alert, appropriate mood          WBC Count: 9.73 K/uL ( @ 06:10)  WBC Count: 8.03 K/uL ( @ 07:38)  WBC Count: 12.74 K/uL ( @ 03:26)      Auto Neutrophil %: 78.5 % *H* (22 @ 03:26)  Auto Neutrophil #: 9.99 K/uL *H* (22 @ 03:26)                            11.3   9.73  )-----------( 200      ( 28 Sep 2022 06:10 )             36.6           142  |  108  |  23  ----------------------------<  117<H>  4.7   |  29  |  0.97    Ca    9.2      28 Sep 2022 06:10  Phos  3.0       Mg     2.6         TPro  6.4  /  Alb  2.6<L>  /  TBili  0.2  /  DBili  0.1  /  AST  57<H>  /  ALT  195<H>  /  AlkPhos  202<H>        Creatinine Trend: 0.97<--, 0.99<--, 1.20<--            MICROBIOLOGY:  .Blood Blood-Peripheral  22   No growth to date.  --  --      .Blood Blood-Peripheral  22   No growth to date.  --  --      Clean Catch Clean Catch (Midstream)  22   50,000 - 99,000 CFU/mL Escherichia coli  <10,000 CFU/ml Normal Urogenital ro present  --  --              v                      SARS-CoV-2 Result: NotDete (22 @ 10:00)      RADIOLOGY:  --  --  --    ACC: [id number] EXAM:  US ABDOMEN COMPLETE                          PROCEDURE DATE:  2022          INTERPRETATION:  CLINICAL INFORMATION: Elevated LFTs.    COMPARISON: CT abdomen/pelvis 2022.    TECHNIQUE: Sonography of the abdomen.    FINDINGS:  Liver: Within normal limits. No focal hepatic masses identified.  Bile ducts: Normal caliber. Common bile duct measures 4 mm.  Gallbladder: Cholecystectomy.  Pancreas: Visualized portions are within normal limits.  Spleen: 12.5 cm. Within normal limits.  Right kidney: 16.0 cm. Marked hydronephrosis with right renal parenchymal   thinning. There is a 1 cm calculus identified within the lower pole   aspect of the right kidney. No space-occupying lesions identified.  Left kidney: 11.9 cm. Mild left-sided hydronephrosis. No space-occupying   lesions. No renal calculi.  Ascites: None.  Aorta and IVC: Poorly visualized.    IMPRESSION:  No focal hepatic masses. No evidence for intrahepatic or extrahepatic   biliary ductal dilatation. Cholecystectomy. Marked right-sided   hydronephrosis with right renal parenchymal thinning. Right renal   calculus.        --- End of Report ---            BRITTANY SMITH MD; Attending Radiologist  This document has been electronically signed. Sep 28 2022 12:07PM  --  --  --  --   [facility]  INFECTIOUS DISEASES   [address] [place] [address]  Tel: [phone]     Fax: [phone]  ======================================================  Rayray Shankar,MD [place], DO Siria Gee, MEGHA   ======================================================    DOMITILA SHAHID  MRN-[id number]        Patient is a 66y old  Female who presents with a chief complaint of right pyelonephritis, severe right hydronephrosis (28 Sep 2022 16:09)      HPI:  66 years old female with h/o hypothyroidism, HLD, morbid obesity present to ED with complain of abd pain radiating to right flank. Pain started on Thur, intermittent, 10/10 " severe pain", associated with chills and sweating. No dysuria, frequency or hematuria. No h/o kidney stones or frequent UTI  Hemodynamically stable, afebrile, sat well at RA. CXR image reviewed, no focal consolidation. WBC 12.74,Plt 195, K 4, Cr 1.2, lactate 1.4. UA appear dirty. CT abd/pelvis image reviewed, severe right hydronephrosis and marked  parenchymal thinning likely due to chronic UPJ obstruction. Cystitis with bilateral ascending ureteritis and possible pyelonephritis in the right kidney    SH: no toxic habits  FH: no family h/o HTN, DM (26 Sep 2022 09:37)    agree with above   ID consulted for workup and antibiotic management     PAST MEDICAL & SURGICAL HISTORY:  Hypothyroid      Insomnia      Dyslipidemia      Asthma      S/P  section      S/P lumbar laminectomy      Cataract  RIGHT  EYE 2010          Allergies  No Known Allergies        ANTIMICROBIALS:  cefTRIAXone   IVPB 2000 every 24 hours      MEDICATIONS  (STANDING):  cefTRIAXone   IVPB   100 mL/Hr IV Intermittent (22 @ 05:46)    cefTRIAXone   IVPB   100 mL/Hr IV Intermittent (22 @ 12:54)   100 mL/Hr IV Intermittent (22 @ 11:01)    cefTRIAXone   IVPB   100 mL/Hr IV Intermittent (22 @ 09:43)        OTHER MEDS: MEDICATIONS  (STANDING):  acetaminophen     Tablet .. 650 every 6 hours PRN  aluminum hydroxide/magnesium hydroxide/simethicone Suspension 30 every 4 hours PRN  enoxaparin Injectable 40 every 12 hours  influenza  Vaccine (HIGH DOSE) 0.7 once  levothyroxine 175 daily  magnesium hydroxide Suspension 30 daily PRN  melatonin 3 at bedtime PRN  ondansetron Injectable 4 every 8 hours PRN  pantoprazole    Tablet 40 before breakfast  senna 2 at bedtime      SOCIAL HISTORY:     denies smoking etoh or drugs   FAMILY HISTORY:  Mother had HTN        REVIEW OF SYSTEMS  [  ] ROS unobtainable because:    [ X ] All other systems negative except as noted below:	    Constitutional:  [ ] fever [ ] chills  [ ] weight loss  [ X] weakness  Skin:  [ ] rash [ ] phlebitis	  Eyes: [ ] icterus [ ] pain  [ ] discharge	  ENMT: [ ] sore throat  [ ] thrush [ ] ulcers [ ] exudates  Respiratory: [ ] dyspnea [ ] hemoptysis [ ] cough [ ] sputum	  Cardiovascular:  [ ] chest pain [ ] palpitations [ ] edema	  Gastrointestinal:  [ ] nausea [ ] vomiting [ ] diarrhea [ ] constipation [ ] pain	  Genitourinary:  [X ] dysuria [ ] frequency [ ] hematuria [ ] discharge [X ] flank pain  [ ] incontinence  Musculoskeletal:  [ ] myalgias [ ] arthralgias [ ] arthritis  [ ] back pain  Neurological:  [ ] headache [ ] seizures  [ ] confusion/altered mental status  Psychiatric:  [ ] anxiety [ ] depression	  Hematology/Lymphatics:  [ ] lymphadenopathy  Endocrine:  [ ] adrenal [ ] thyroid  Allergic/Immunologic:	 [ ] transplant [ ] seasonal    Vital Signs Last 24 Hrs  T(F): 97.7 (22 @ 05:05), Max: 98.9 (22 @ 02:16)    Vital Signs Last 24 Hrs  HR: 61 (22 @ 05:05) (61 - 70)  BP: 133/67 (22 @ 05:05) (120/69 - 133/67)  RR: 18 (22 @ 05:05)  SpO2: 96% (22 @ 05:05) (96% - 97%)  Wt(kg): --    PHYSICAL EXAM:  Constitutional: non-toxic, no distress  HEAD/EYES: anicteric, no conjunctival injection  ENT:  supple, no thrush  Cardiovascular:   normal S1, S2, no murmur, no edema  Respiratory:  clear BS bilaterally, no wheezes, no rales  GI:  soft, non-tender, normal bowel sounds, obese abdomen   :  no baez, no CVA tenderness  Musculoskeletal:  no synovitis, normal ROM  Neurologic: awake and alert, normal strength, no focal findings  Skin:  no rash, no erythema, no phlebitis  Heme/Onc: no lymphadenopathy   Psychiatric:  awake, alert, appropriate mood          WBC Count: 9.73 K/uL ( @ 06:10)  WBC Count: 8.03 K/uL ( @ 07:38)  WBC Count: 12.74 K/uL ( @ 03:26)      Auto Neutrophil %: 78.5 % *H* (22 @ 03:26)  Auto Neutrophil #: 9.99 K/uL *H* (22 @ 03:26)                            11.3   9.73  )-----------( 200      ( 28 Sep 2022 06:10 )             36.6           142  |  108  |  23  ----------------------------<  117<H>  4.7   |  29  |  0.97    Ca    9.2      28 Sep 2022 06:10  Phos  3.0       Mg     2.6         TPro  6.4  /  Alb  2.6<L>  /  TBili  0.2  /  DBili  0.1  /  AST  57<H>  /  ALT  195<H>  /  AlkPhos  202<H>        Creatinine Trend: 0.97<--, 0.99<--, 1.20<--      MICROBIOLOGY:  .Blood Blood-Peripheral  22   No growth to date.  --  --      .Blood Blood-Peripheral  22   No growth to date.  --  --      Clean Catch Clean Catch (Midstream)  22   50,000 - 99,000 CFU/mL Escherichia coli  <10,000 CFU/ml Normal Urogenital ro present  --  --      SARS-CoV-2 Result: NotDetec (22 @ 10:00)      RADIOLOGY:  CT Abdomen and Pelvis w/ IV Cont (22 @ 04:55)   IMPRESSION:  Motion degraded exam.    Severe right hydronephrosis and marked parenchymal thinning likely due to   chronic UPJ obstruction. Cystitis with bilateral ascending ureteritis and   possible pyelonephritis in the right kidney. Clinical correlation is   recommended.    Diverticulosis without evidence ofdiverticulitis    ACC: [id number] EXAM:  US ABDOMEN COMPLETE                          PROCEDURE DATE:  2022          INTERPRETATION:  CLINICAL INFORMATION: Elevated LFTs.    COMPARISON: CT abdomen/pelvis 2022.    TECHNIQUE: Sonography of the abdomen.    FINDINGS:  Liver: Within normal limits. No focal hepatic masses identified.  Bile ducts: Normal caliber. Common bile duct measures 4 mm.  Gallbladder: Cholecystectomy.  Pancreas: Visualized portions are within normal limits.  Spleen: 12.5 cm. Within normal limits.  Right kidney: 16.0 cm. Marked hydronephrosis with right renal parenchymal   thinning. There is a 1 cm calculus identified within the lower pole   aspect of the right kidney. No space-occupying lesions identified.  Left kidney: 11.9 cm. Mild left-sided hydronephrosis. No space-occupying   lesions. No renal calculi.  Ascites: None.  Aorta and IVC: Poorly visualized.    IMPRESSION:  No focal hepatic masses. No evidence for intrahepatic or extrahepatic   biliary ductal dilatation. Cholecystectomy. Marked right-sided   hydronephrosis with right renal parenchymal thinning. Right renal   calculus.

## 2022-09-28 NOTE — DIETITIAN INITIAL EVALUATION ADULT - ENTER FROM (G/KG)
Pt discharged per MD orders. Pt AOx4 and ambulated out of the ER with steady gait. Pt had all discahrge paperwork, and belonging with her when she left. Pt was explained use of C-collar use will help with alignment and pain but she removed it prior to leaving. Medications for pain explained and pt left with prescriptions. Instructed to follow up with PCP if pain continues.    1

## 2022-09-28 NOTE — DIETITIAN INITIAL EVALUATION ADULT - OTHER INFO
Pt with good appetite and good po intake PTA; has been actively trying to lose weight for the past 2 years. Pt has been following general healthy diet; has been eating mostly yogurt, vegetables, fish, salads, & non-processed whole foods. Pt has also been walking 3x/week.  Pt continues with good appetite, po intake >75% during admission; no chewing/swallowing difficulty; reports constipation (on stool softener/laxative).  Pt reports 35 lb. intentional wt loss over 2 years;  lbs. x 2 years ago and current wt 210 lbs.

## 2022-09-28 NOTE — PROGRESS NOTE ADULT - ASSESSMENT
66 years old female with h/o hypothyroidism, HLD, morbid obesity present to ED with complain of abd pain radiating to right flank. Pain started on Thur, intermittent, 10/10 " severe pain", associated with chills and sweating. No dysuria, frequency or hematuria. No h/o kidney stones or frequent UTI  Hemodynamically stable, afebrile, sat well at RA. CXR image reviewed, no focal consolidation. WBC 12.74,Plt 195, K 4, Cr 1.2, lactate 1.4. UA appear dirty. CT abd/pelvis image reviewed, severe right hydronephrosis and marked  parenchymal thinning likely due to chronic UPJ obstruction. Cystitis with bilateral ascending ureteritis and possible pyelonephritis in the right kidney      Admitted with acute pyelonephritis, severe right hydronephrosis    Problem/Plan - 1:  ·  Problem: Acute pyelonephritis.   ·  Plan: right flank pain, CVA tenderness +, suprapubic tenderness+  CT with Cystitis with bilateral ascending ureteritis and possible pyelonephritis in the right kidney  (9/27) Continue ceftriaxone 2g daily  Follow up urine culture  F/U blood cultures     Problem/Plan - 2:  ·  Problem: Hydronephrosis, right 2/2 pyelonephritis  ·  Plan: CT abd/pelvis image reviewed, severe right hydronephrosis and marked  parenchymal thinning likely due to chronic UPJ obstruction  Ultrasound in 08/2015 with normal kidney  F/U urology  Monitor renal function.    Problem/Plan - 3:  ·  Problem: ELLI (acute kidney injury).   ·  Plan: Cr 1.2 ( no recent Cr in system, last in 2020 was 1)  ? due to infectious etiology vs UPJ obstruction of right kidney  Monitor renal function, avoid nephrotoxic substances  Nephrology consulted.    Problem/Plan - 4:  ·  Problem: Hypothyroidism, unspecified.   ·  Plan: continue synthroid 175mcg.    Problem/Plan - 5:  ·  Problem: Hyperlipidemia, unspecified.   ·  Plan: continue simvastatin 20mg hs.    Problem/Plan - 6:  ·  Problem: Morbid obesity.   ·  Plan: weight loss and lifestyle modification.   66 years old female with h/o hypothyroidism, HLD, morbid obesity present to ED with complain of abd pain radiating to right flank. Pain started on Thur, intermittent, 10/10 " severe pain", associated with chills and sweating. No dysuria, frequency or hematuria.    Hemodynamically stable, afebrile, sat well at RA. CXR image reviewed, no focal consolidation. WBC 12.74,Plt 195, K 4, Cr 1.2, lactate 1.4. UA appear dirty. CT abd/pelvis image reviewed, severe right hydronephrosis and marked  parenchymal thinning likely due to chronic UPJ obstruction. Cystitis with bilateral ascending ureteritis and possible pyelonephritis in the right kidney  Admitted with acute pyelonephritis, severe right hydronephrosis    Assessment and Plan:     Cystitis with bilateral ascending ureteritis and possible pyelonephritis in the right kidney  severe right hydronephrosis , likely  chronic UPJ obstruction.  Mild leukocytosis on admission , normalized   nephrolithiasis   -right flank pain, CVA tenderness +, suprapubic tenderness+  CT with Cystitis with bilateral ascending ureteritis and possible pyelonephritis in the right kidney  UCx : 50-99K E. coli   Blood Cx --NGTD   Continue ceftriaxone 2g daily  ID, urology consult     Presumed ELLI POA, most likely post renal   nephrology following   avoid nephrotoxic meds, dose all meds per eGFR   monitor Cr     Transaminitis, patient is s/p cholecystectomy   RUQ tenderness ?   Abd US: No evidence for intrahepatic or extrahepatic biliary ductal dilatation.  Marked hydronephrosis with right renal parenchymal   thinning. There is a 1 cm calculus identified within the lower pole   aspect of the right kidney. No space-occupying lesions identified.  Left kidney: 11.9 cm. Mild left-sided hydronephrosis. No space-occupying        Hypothyroidism, unspecified.   - continue synthroid 175mcg.     Hyperlipidemia, unspecified.   hold simvastatin 20mg hs. in view of transaminitis      Severe Morbid obesity BMI 41   pt counselled on diet and lifestyle modification, gradual weight loss, and activity.     GERD--PPI , maalox prn     constipation --MOM x 1 9/28  add senna qhs      3 mm right lower lobe juxtapleural pulmonary nodule  --outpatient follow-up, discussed with patient   denies history of smoking or second hand exposure     Preventive measures   -lovenox SQ-dvt ppx  fall precautions   patient is independent

## 2022-09-28 NOTE — CONSULT NOTE ADULT - SUBJECTIVE AND OBJECTIVE BOX
"66 years old female with h/o hypothyroidism, HLD, morbid obesity present to ED with complain of abd pain radiating to right flank. Pain started on Thur, intermittent, 10/10 " severe pain", associated with chills and sweating. No dysuria, frequency or hematuria. No h/o kidney stones or frequent UTI  Hemodynamically stable, afebrile, sat well at RA. CXR image reviewed, no focal consolidation. WBC 12.74,Plt 195, K 4, Cr 1.2, lactate 1.4. UA appear dirty. CT abd/pelvis image reviewed, severe right hydronephrosis and marked  parenchymal thinning likely due to chronic UPJ obstruction. Cystitis with bilateral ascending ureteritis and possible pyelonephritis in the right kidney"    Urology consulted for CT finding of chronic R UPJ obstruction    PAST MEDICAL & SURGICAL HISTORY:  Hypothyroid      Insomnia      Dyslipidemia      Asthma      S/P  section      S/P lumbar laminectomy      Cataract  RIGHT  EYE 2010        FAMILY HISTORY:  No pertinent family history in first degree relatives      SOCIAL HISTORY:   Tobacco hx:   MEDICATIONS  (STANDING):  cefTRIAXone   IVPB 2000 milliGRAM(s) IV Intermittent every 24 hours  enoxaparin Injectable 40 milliGRAM(s) SubCutaneous every 12 hours  influenza  Vaccine (HIGH DOSE) 0.7 milliLiter(s) IntraMuscular once  levothyroxine 175 MICROGram(s) Oral daily  pantoprazole    Tablet 40 milliGRAM(s) Oral before breakfast  senna 2 Tablet(s) Oral at bedtime    MEDICATIONS  (PRN):  acetaminophen     Tablet .. 650 milliGRAM(s) Oral every 6 hours PRN Temp greater or equal to 38C (100.4F), Mild Pain (1 - 3), Moderate Pain (4 - 6)  aluminum hydroxide/magnesium hydroxide/simethicone Suspension 30 milliLiter(s) Oral every 4 hours PRN Dyspepsia  magnesium hydroxide Suspension 30 milliLiter(s) Oral daily PRN Constipation  melatonin 3 milliGRAM(s) Oral at bedtime PRN Insomnia  ondansetron Injectable 4 milliGRAM(s) IV Push every 8 hours PRN Nausea and/or Vomiting    Allergies    No Known Allergies    Intolerances        REVIEW OF SYSTEMS: Pertinent positives and negatives as stated in HPI, otherwise negative    Vital signs  T(C): 36.5 (22 @ 05:05), Max: 36.6 (22 @ 23:56)  HR: 61 (22 @ 05:05)  BP: 133/67 (22 @ 05:05)  SpO2: 96% (22 @ 05:05)  Wt(kg): --    Physical Exam  Gen: NAD  Abd: Soft, NT, ND, no rebound tenderness or guarding  : WNL  Back: No CVAT       LABS:     @ 06:10    WBC 9.73  / Hct 36.6  / SCr 0.97      @ 07:38    WBC 8.03  / Hct 35.3  / SCr 0.99         142  |  108  |  23  ----------------------------<  117<H>  4.7   |  29  |  0.97    Ca    9.2      28 Sep 2022 06:10  Phos  3.0       Mg     2.6         TPro  6.4  /  Alb  2.6<L>  /  TBili  0.2  /  DBili  0.1  /  AST  57<H>  /  ALT  195<H>  /  AlkPhos  202<H>        Radiology:  ACC: [id number] EXAM:  CT ABDOMEN AND PELVIS IC                          PROCEDURE DATE:  2022          INTERPRETATION:  CLINICAL INFORMATION: Right-sided abdominal pain.    COMPARISON: Abdominal sonogram 2016    CONTRAST/COMPLICATIONS:  IV Contrast: Omnipaque 350  95 cc administered   5 cc discarded  Oral Contrast: NONE  Complications: None reported at time of study completion    PROCEDURE:  CT of the Abdomen and Pelvis was performed.  Sagittal and coronal reformats were performed.    FINDINGS:  There is motion artifact present which degrades image quality.    LOWER CHEST: 3 mm right lower lobe juxtapleural pulmonary nodule (2-33)    LIVER: Within normal limits.  BILE DUCTS: Normal caliber.  GALLBLADDER: Cholecystectomy.  SPLEEN: Within normal limits.  PANCREAS: Within normal limits.  ADRENALS: Within normal limits.  KIDNEYS/URETERS: Severe right hydroureteronephrosis to the level of the   UPJ which appears to be due to chronic UPJ obstruction. No stone or   apparent mass is identified. There is marked cortical thinning of the   right renal parenchyma suggesting chronicity. Area of hypoenhancement   suggested in the upper pole of the dens right renal parenchyma with   adjacent perinephric stranding. Nonobstructing lower stone in the lower   pole the right kidney. Left extrarenal pelvis with mild fullness.   Bilateral urothelial enhancement, greater on the right. No definite area   of hypoenhancement in the left kidney.    BLADDER: Circumferential wall thickening and perivesicular fat stranding.  REPRODUCTIVE ORGANS: Uterus and adnexal structures are within normal   limits.    BOWEL: No bowel obstruction. Appendix is normal. Descending and sigmoid   colon diverticulosis. No wall thickening or pericolonic inflammatory   change.  PERITONEUM: No ascites.  VESSELS: Within normal limits.  RETROPERITONEUM/LYMPH NODES: No lymphadenopathy.  ABDOMINAL WALL: Within normal limits.  BONES: Degenerative changes.    IMPRESSION:  Motion degraded exam.    Severe right hydronephrosis and marked parenchymal thinning likely due to   chronic UPJ obstruction. Cystitis with bilateral ascending ureteritis and   possible pyelonephritis in the right kidney. Clinical correlation is   recommended.    Diverticulosis without evidence of diverticulitis.    --- End of Report ---        KRISHNA AVERY MD; Attending Radiologist  This document has been electronically signed. Sep 26 2022  5:10AM

## 2022-09-28 NOTE — CONSULT NOTE ADULT - REASON FOR ADMISSION
right pyelonephritis, severe right hydronephrosis

## 2022-09-29 ENCOUNTER — TRANSCRIPTION ENCOUNTER (OUTPATIENT)
Age: 67
End: 2022-09-29

## 2022-09-29 VITALS
RESPIRATION RATE: 19 BRPM | OXYGEN SATURATION: 97 % | SYSTOLIC BLOOD PRESSURE: 135 MMHG | DIASTOLIC BLOOD PRESSURE: 79 MMHG | HEART RATE: 68 BPM | TEMPERATURE: 98 F

## 2022-09-29 LAB
ALBUMIN SERPL ELPH-MCNC: 2.3 G/DL — LOW (ref 3.3–5)
ALP SERPL-CCNC: 181 U/L — HIGH (ref 40–120)
ALT FLD-CCNC: 120 U/L — HIGH (ref 12–78)
ANION GAP SERPL CALC-SCNC: 4 MMOL/L — LOW (ref 5–17)
AST SERPL-CCNC: 24 U/L — SIGNIFICANT CHANGE UP (ref 15–37)
BILIRUB SERPL-MCNC: 0.2 MG/DL — SIGNIFICANT CHANGE UP (ref 0.2–1.2)
BUN SERPL-MCNC: 20 MG/DL — SIGNIFICANT CHANGE UP (ref 7–23)
CALCIUM SERPL-MCNC: 8.8 MG/DL — SIGNIFICANT CHANGE UP (ref 8.5–10.1)
CHLORIDE SERPL-SCNC: 107 MMOL/L — SIGNIFICANT CHANGE UP (ref 96–108)
CO2 SERPL-SCNC: 29 MMOL/L — SIGNIFICANT CHANGE UP (ref 22–31)
CREAT SERPL-MCNC: 0.95 MG/DL — SIGNIFICANT CHANGE UP (ref 0.5–1.3)
EGFR: 65 ML/MIN/1.73M2 — SIGNIFICANT CHANGE UP
GLUCOSE SERPL-MCNC: 108 MG/DL — HIGH (ref 70–99)
POTASSIUM SERPL-MCNC: 4.8 MMOL/L — SIGNIFICANT CHANGE UP (ref 3.5–5.3)
POTASSIUM SERPL-SCNC: 4.8 MMOL/L — SIGNIFICANT CHANGE UP (ref 3.5–5.3)
PROT SERPL-MCNC: 6 GM/DL — SIGNIFICANT CHANGE UP (ref 6–8.3)
SODIUM SERPL-SCNC: 140 MMOL/L — SIGNIFICANT CHANGE UP (ref 135–145)

## 2022-09-29 PROCEDURE — 99239 HOSP IP/OBS DSCHRG MGMT >30: CPT

## 2022-09-29 PROCEDURE — 99232 SBSQ HOSP IP/OBS MODERATE 35: CPT

## 2022-09-29 PROCEDURE — 99232 SBSQ HOSP IP/OBS MODERATE 35: CPT | Mod: FS

## 2022-09-29 RX ORDER — CEFPODOXIME PROXETIL 100 MG
1 TABLET ORAL
Qty: 12 | Refills: 0
Start: 2022-09-29 | End: 2022-10-04

## 2022-09-29 RX ORDER — PANTOPRAZOLE SODIUM 20 MG/1
1 TABLET, DELAYED RELEASE ORAL
Qty: 0 | Refills: 0 | DISCHARGE

## 2022-09-29 RX ORDER — SENNA PLUS 8.6 MG/1
2 TABLET ORAL
Qty: 60 | Refills: 0
Start: 2022-09-29 | End: 2022-10-28

## 2022-09-29 RX ORDER — ACETAMINOPHEN 500 MG
2 TABLET ORAL
Qty: 0 | Refills: 0 | DISCHARGE
Start: 2022-09-29

## 2022-09-29 RX ADMIN — Medication 175 MICROGRAM(S): at 05:11

## 2022-09-29 RX ADMIN — PANTOPRAZOLE SODIUM 40 MILLIGRAM(S): 20 TABLET, DELAYED RELEASE ORAL at 07:32

## 2022-09-29 RX ADMIN — MAGNESIUM HYDROXIDE 30 MILLILITER(S): 400 TABLET, CHEWABLE ORAL at 13:54

## 2022-09-29 RX ADMIN — CEFTRIAXONE 100 MILLIGRAM(S): 500 INJECTION, POWDER, FOR SOLUTION INTRAMUSCULAR; INTRAVENOUS at 11:14

## 2022-09-29 RX ADMIN — ENOXAPARIN SODIUM 40 MILLIGRAM(S): 100 INJECTION SUBCUTANEOUS at 11:13

## 2022-09-29 NOTE — PROGRESS NOTE ADULT - ASSESSMENT
66 years old female with h/o hypothyroidism, HLD, morbid obesity present to ED with complain of abd pain radiating to right flank.   In the ED, Hemodynamically stable, afebrile, sat well at [facility].   CXR (I personally reviewed) , no focal consolidation  WBC 12.74,Plt 195, K 4, Cr 1.2, lactate 1.4.   UA positive with symptoms   CT abd/pelvis (I personally reviewed the images of this CT) , severe right hydronephrosis and marked  parenchymal thinning likely due to chronic UPJ obstruction. Cystitis with bilateral ascending ureteritis and possible pyelonephritis in the right kidney  Admitted with acute pyelonephritis, severe right hydronephrosis    9/29: no fever, no new cbc, cr ok, LFTs better, BCs with  no growth to date, UC grew E. coli. US renal - right renal calculus, hydronephrosis. Today is day #4 of ceftriaxone, will need 10 days of abx total, ok to change to po Vantin if the pt is medically stable for discharge.     pyelonephritis  transaminitis   Hydroureteronephrosis   Morbid obesity     Plan:  continue ceftriaxone while in pt  ok to change abx to po Vantin to complete 10 days treatment total  if medically stable for discharge  f/up with urology as op on chronic UPJ obstruction, right renal calculus, hydronephrosis   monitor LFTs - improving   follow all cultures   follow urology recommendations   diet and exercise counselling   weight loss counselling very important for long term health benefits   consider referral to Bariatric surgery program here at [facility]    Discussed with Dr. Abraham  Discussed with Dr. Padilla

## 2022-09-29 NOTE — DISCHARGE NOTE PROVIDER - CARE PROVIDER_API CALL
Dwayne Musa)  Urology  [address], [address] [place] [address]  Phone: [phone]  Fax: [phone]  Follow Up Time: 1 week    your primary care doctor,   Phone: (   )    -  Fax: (   )    -  Follow Up Time: 1 week   your primary care doctor,   Phone: (   )    -  Fax: (   )    -  Follow Up Time: 1 week    Simon Josue; PhD)  Urology  Follow Up Time: 1 week

## 2022-09-29 NOTE — DISCHARGE NOTE PROVIDER - CARE PROVIDERS DIRECT ADDRESSES
[email or web address],DirectAddress_Unknown ,DirectAddress_Unknown,DirectAddress_Unknown

## 2022-09-29 NOTE — DISCHARGE NOTE NURSING/CASE MANAGEMENT/SOCIAL WORK - NSDCVIVACCINE_GEN_ALL_CORE_FT
Tdap; 14-Dec-2020 10:14; Kaylin Dumont (RN); [employer]; [id number] (Exp. Date: 21-Jul-2022); IntraMuscular; Deltoid Left.; 0.5 milliLiter(s); VIS (VIS Published: 09-May-2013, VIS Presented: 14-Dec-2020);

## 2022-09-29 NOTE — DISCHARGE NOTE PROVIDER - DETAILS OF MALNUTRITION DIAGNOSIS/DIAGNOSES
[FreeTextEntry1] : EKG-NSR, RBBB- no change
This patient has been assessed with a concern for Malnutrition and was treated during this hospitalization for the following Nutrition diagnosis/diagnoses:     -  09/28/2022: Morbid obesity (BMI > 40)

## 2022-09-29 NOTE — PROGRESS NOTE ADULT - NS ATTEND AMEND GEN_ALL_CORE FT
I agree with the statements above.  UPJ stricture. Discharged with order for Kidney nuclear scan. Will F/U in office to plan for surgery
agree with above plan  can d/c home on vantin to complete 10 days of antibiotics   follow up with urology outpatient     Discussed with Dr. Jarad Padilla, DO  Infectious Disease Attending  Reachable via Microsoft Teams or ID office: [phone]  After 5pm/weekends please call [phone] for all inquiries and new consults

## 2022-09-29 NOTE — PROGRESS NOTE ADULT - SUBJECTIVE AND OBJECTIVE BOX
Patient seen and examined bedside resting comfortably.  No complaints offered.   Voiding spontaneously without difficulty.      T(F): 97.8 (09-29-22 @ 11:13), Max: 98.9 (09-28-22 @ 17:01)  HR: 68 (09-29-22 @ 11:13) (66 - 69)  BP: 135/79 (09-29-22 @ 11:13) (109/65 - 135/79)  RR: 19 (09-29-22 @ 11:13) (18 - 19)  SpO2: 97% (09-29-22 @ 11:13) (95% - 98%)      PHYSICAL EXAM:    General: NAD, alert and awake  HEENT: NCAT, EOMI, conjunctiva clear  Chest: nonlabored respirations, CTA b/l.  Abdomen: soft, NT/ND.   Extremities: Calf soft, nontender b/l.   : No suprapubic tenderness or bladder distention. Voiding spontaneously without difficulty.       LABS:                        11.3   9.73  )-----------( 200      ( 28 Sep 2022 06:10 )             36.6   09-29    140  |  107  |  20  ----------------------------<  108<H>  4.8   |  29  |  0.95    Ca    8.8      29 Sep 2022 06:22    TPro  6.0  /  Alb  2.3<L>  /  TBili  0.2  /  DBili  x   /  AST  24  /  ALT  120<H>  /  AlkPhos  181<H>  09-29    I&O's Detail    28 Sep 2022 07:01  -  29 Sep 2022 07:00  --------------------------------------------------------  IN:    IV PiggyBack: 240 mL    Oral Fluid: 490 mL  Total IN: 730 mL    OUT:  Total OUT: 0 mL    Total NET: 730 mL

## 2022-09-29 NOTE — DISCHARGE NOTE PROVIDER - NSDCCPCAREPLAN_GEN_ALL_CORE_FT
PRINCIPAL DISCHARGE DIAGNOSIS  Diagnosis: Pyelonephritis of right kidney  Assessment and Plan of Treatment:       SECONDARY DISCHARGE DIAGNOSES  Diagnosis: Hypothyroidism, unspecified  Assessment and Plan of Treatment:     Diagnosis: Hydronephrosis, right  Assessment and Plan of Treatment:     Diagnosis: Pulmonary nodule  Assessment and Plan of Treatment: 3 mm right lower lobe juxtapleural pulmonary nodule  follow-up with your primary care doctor

## 2022-09-29 NOTE — DISCHARGE NOTE NURSING/CASE MANAGEMENT/SOCIAL WORK - NSDCPEFALRISK_GEN_ALL_CORE
For information on Fall & Injury Prevention, visit: [email or web address] OR  [email or web address] OR  https://www.cdc.gov/steadi/patient.html

## 2022-09-29 NOTE — DISCHARGE NOTE PROVIDER - NSDCFUADDAPPT_GEN_ALL_CORE_FT
It is important to see your primary physician as well as other necessary consultants within the next week to perform a comprehensive medical review.  Call their offices for an appointment as soon as you leave the hospital.  If you do not have a primary physician or cant reach him/her, contact the [facility] Physician Referral Service at [phone].  Your medical issues appear to be stable at this time, but if your symptoms recur or worsen, contact your physicians and/or return to the hospital if necessary.  If you encounter any issues or questions with your medication, call your physicians before stopping the medication.

## 2022-09-29 NOTE — DISCHARGE NOTE PROVIDER - PROVIDER TOKENS
PROVIDER:[TOKEN:[[id number]],FOLLOWUP:[1 week]],FREE:[LAST:[your primary care doctor],PHONE:[(   )    -],FAX:[(   )    -],FOLLOWUP:[1 week]] FREE:[LAST:[your primary care doctor],PHONE:[(   )    -],FAX:[(   )    -],FOLLOWUP:[1 week]],PROVIDER:[TOKEN:[[id number]],FOLLOWUP:[1 week]]

## 2022-09-29 NOTE — DISCHARGE NOTE NURSING/CASE MANAGEMENT/SOCIAL WORK - NSDCFUADDAPPT_GEN_ALL_CORE_FT
It is important to see your primary physician as well as other necessary consultants within the next week to perform a comprehensive medical review.  Call their offices for an appointment as soon as you leave the hospital.  If you do not have a primary physician or cant reach him/her, contact the [facility] Physician Referral Service at [phone].  Your medical issues appear to be stable at this time, but if your symptoms recur or worsen, contact your physicians and/or return to the hospital if necessary.  If you encounter any issues or questions with your medication, call your physicians before stopping the medication.

## 2022-09-29 NOTE — DISCHARGE NOTE PROVIDER - NSDCMRMEDTOKEN_GEN_ALL_CORE_FT
acetaminophen 325 mg oral tablet: 2 tab(s) orally every 6 hours, As needed,   senna leaf extract oral tablet: 2 tab(s) orally once a day (at bedtime)  simvastatin 20 mg oral tablet: 1 tab(s) orally once a day (at bedtime)  Synthroid 175 mcg (0.175 mg) oral tablet: 1 tab(s) orally once a day   acetaminophen 325 mg oral tablet: 2 tab(s) orally every 6 hours, As needed,   cefpodoxime 200 mg oral tablet: 1 tab(s) orally 2 times a day   senna leaf extract oral tablet: 2 tab(s) orally once a day (at bedtime)  simvastatin 20 mg oral tablet: 1 tab(s) orally once a day (at bedtime)  Synthroid 175 mcg (0.175 mg) oral tablet: 1 tab(s) orally once a day

## 2022-09-29 NOTE — PROGRESS NOTE ADULT - REASON FOR ADMISSION
right pyelonephritis, severe right hydronephrosis

## 2022-09-29 NOTE — DISCHARGE NOTE PROVIDER - HOSPITAL COURSE
66 years old female with h/o hypothyroidism, HLD, morbid obesity present to ED with complain of abd pain radiating to right flank. Pain started on Thur, intermittent, 10/10 " severe pain", associated with chills and sweating. No dysuria, frequency or hematuria.    Hemodynamically stable, afebrile, sat well at RA. CXR image reviewed, no focal consolidation. WBC 12.74,Plt 195, K 4, Cr 1.2, lactate 1.4. UA appear dirty. CT abd/pelvis image reviewed, severe right hydronephrosis and marked  parenchymal thinning likely due to chronic UPJ obstruction. Cystitis with bilateral ascending ureteritis and possible pyelonephritis in the right kidney  Admitted with acute pyelonephritis, severe right hydronephrosis    Vital Signs Last 24 Hrs  T(C): 36.6 (29 Sep 2022 11:13), Max: 37.2 (28 Sep 2022 17:01)  T(F): 97.8 (29 Sep 2022 11:13), Max: 98.9 (28 Sep 2022 17:01)  HR: 68 (29 Sep 2022 11:13) (66 - 69)  BP: 135/79 (29 Sep 2022 11:13) (109/65 - 135/79)  BP(mean): --  RR: 19 (29 Sep 2022 11:13) (18 - 19)  SpO2: 97% (29 Sep 2022 11:13) (95% - 98%)    Parameters below as of 29 Sep 2022 11:13  Patient On (Oxygen Delivery Method): room air    GENERAL: NAD, obese, no increased WOB   HEAD:  Atraumatic, Normocephalic  EYES: EOMI, PERRLA, conjunctiva and sclera clear  ENMT: No tonsillar erythema, exudates, or enlargement; Moist mucous membranes   NECK: Supple, No JVD   NERVOUS SYSTEM:  Alert & Oriented X3, Good concentration; nonfocal   CHEST/LUNG: CTAB  HEART: Regular rate and rhythm; No murmurs, rubs, or gallops  ABDOMEN: Soft, epigastric and RUQ tenderness,  Nondistended; Bowel sounds present  EXTREMITIES:  2+ Peripheral Pulses b/l, No clubbing, cyanosis, calf tenderness or edema b/l         DISCHARGE DIAGNOSES:     Cystitis with bilateral ascending ureteritis and possible pyelonephritis in the right kidney  severe right hydronephrosis , likely  chronic UPJ obstruction.  Mild leukocytosis on admission , normalized   nephrolithiasis   -right flank pain, CVA tenderness +, suprapubic tenderness+  CT with Cystitis with bilateral ascending ureteritis and possible pyelonephritis in the right kidney  UCx : 50-99K E. coli   Blood Cx --NGTD   Continue ceftriaxone 2g daily  ID, urology consult     Presumed ELLI POA, most likely post renal   nephrology following   avoid nephrotoxic meds, dose all meds per eGFR   monitor Cr     Transaminitis, patient is s/p cholecystectomy   RUQ tenderness ?   Abd US: No evidence for intrahepatic or extrahepatic biliary ductal dilatation.  Marked hydronephrosis with right renal parenchymal   thinning. There is a 1 cm calculus identified within the lower pole   aspect of the right kidney. No space-occupying lesions identified.  Left kidney: 11.9 cm. Mild left-sided hydronephrosis. No space-occupying        Hypothyroidism, unspecified.   - continue synthroid 175mcg.     Hyperlipidemia, unspecified.   hold simvastatin 20mg hs. in view of transaminitis   repeat      Severe Morbid obesity BMI 41   pt counselled on diet and lifestyle modification, gradual weight loss, and activity.     GERD--PPI , maalox prn     constipation --MOM x 1 9/28  continue with  senna qhs      3 mm right lower lobe juxtapleural pulmonary nodule  --outpatient follow-up, discussed with patient   denies history of smoking or second hand exposure    Discharge time : 40 min   RETURN PARAMETERS DISCUSSED WITH PATIENT, PATIENT EXPRESSED UNDERSTANDING AND IS AGREEABLE. DISCUSSED WITH PATIENT ON REFRAINING FROM DRIVING UNTIL FOLLOW-UP/ CLEARED BY PMD. PATIENT EXPRESSED UNDERSTANDING. 66 years old female with h/o hypothyroidism, HLD, morbid obesity present to ED with complain of abd pain radiating to right flank. Pain started on Thur, intermittent, 10/10 " severe pain", associated with chills and sweating. No dysuria, frequency or hematuria.    Hemodynamically stable, afebrile, sat well at RA. CXR image reviewed, no focal consolidation. WBC 12.74,Plt 195, K 4, Cr 1.2, lactate 1.4. UA appear dirty. CT abd/pelvis image reviewed, severe right hydronephrosis and marked  parenchymal thinning likely due to chronic UPJ obstruction. Cystitis with bilateral ascending ureteritis and possible pyelonephritis in the right kidney  Admitted with acute pyelonephritis, severe right hydronephrosis    Vital Signs Last 24 Hrs  T(C): 36.6 (29 Sep 2022 11:13), Max: 37.2 (28 Sep 2022 17:01)  T(F): 97.8 (29 Sep 2022 11:13), Max: 98.9 (28 Sep 2022 17:01)  HR: 68 (29 Sep 2022 11:13) (66 - 69)  BP: 135/79 (29 Sep 2022 11:13) (109/65 - 135/79)  BP(mean): --  RR: 19 (29 Sep 2022 11:13) (18 - 19)  SpO2: 97% (29 Sep 2022 11:13) (95% - 98%)    Parameters below as of 29 Sep 2022 11:13  Patient On (Oxygen Delivery Method): room air    GENERAL: NAD, obese, no increased WOB   HEAD:  Atraumatic, Normocephalic  EYES: EOMI, PERRLA, conjunctiva and sclera clear  ENMT: No tonsillar erythema, exudates, or enlargement; Moist mucous membranes   NECK: Supple, No JVD   NERVOUS SYSTEM:  Alert & Oriented X3, Good concentration; nonfocal   CHEST/LUNG: CTAB  HEART: Regular rate and rhythm; No murmurs, rubs, or gallops  ABDOMEN: Soft, epigastric and RUQ tenderness,  Nondistended; Bowel sounds present  EXTREMITIES:  2+ Peripheral Pulses b/l, No clubbing, cyanosis, calf tenderness or edema b/l         DISCHARGE DIAGNOSES:     Cystitis with bilateral ascending ureteritis and possible pyelonephritis in the right kidney  severe right hydronephrosis , likely  chronic UPJ obstruction.  Mild leukocytosis on admission , normalized   nephrolithiasis   -right flank pain, CVA tenderness +, suprapubic tenderness+  CT with Cystitis with bilateral ascending ureteritis and possible pyelonephritis in the right kidney  UCx : 50-99K E. coli   Blood Cx --NGTD   Continue ceftriaxone 2g daily  ID, urology consult     Presumed ELLI POA, most likely post renal   nephrology following   avoid nephrotoxic meds, dose all meds per eGFR   monitor Cr     Transaminitis, improved   patient is s/p cholecystectomy   RUQ tenderness ? improved   Abd US: No evidence for intrahepatic or extrahepatic biliary ductal dilatation.  Marked hydronephrosis with right renal parenchymal   thinning. There is a 1 cm calculus identified within the lower pole   aspect of the right kidney. No space-occupying lesions identified.  Left kidney: 11.9 cm. Mild left-sided hydronephrosis. No space-occupying        Hypothyroidism, unspecified.   - continue synthroid 175mcg.     Hyperlipidemia, unspecified.   c/w simvastatin 20mg hs   repeat LFTs in 1 week with PMD      Severe Morbid obesity BMI 41   pt counselled on diet and lifestyle modification, gradual weight loss, and activity.     GERD--PPI , maalox prn     constipation --MOM x 1 9/28  continue with  senna qhs      3 mm right lower lobe juxtapleural pulmonary nodule  --outpatient follow-up, discussed with patient   denies history of smoking or second hand exposure    Discharge time : 40 min   RETURN PARAMETERS DISCUSSED WITH PATIENT, PATIENT EXPRESSED UNDERSTANDING AND IS AGREEABLE. DISCUSSED WITH PATIENT ON REFRAINING FROM DRIVING UNTIL FOLLOW-UP/ CLEARED BY PMD. PATIENT EXPRESSED UNDERSTANDING. 66 years old female with h/o hypothyroidism, HLD, morbid obesity present to ED with complain of abd pain radiating to right flank. Pain started on Thur, intermittent, 10/10 " severe pain", associated with chills and sweating. No dysuria, frequency or hematuria.    Hemodynamically stable, afebrile, sat well at RA. CXR image reviewed, no focal consolidation. WBC 12.74,Plt 195, K 4, Cr 1.2, lactate 1.4. UA appear dirty. CT abd/pelvis image reviewed, severe right hydronephrosis and marked  parenchymal thinning likely due to chronic UPJ obstruction. Cystitis with bilateral ascending ureteritis and possible pyelonephritis in the right kidney  Admitted with acute pyelonephritis, severe right hydronephrosis    Vital Signs Last 24 Hrs  T(C): 36.6 (29 Sep 2022 11:13), Max: 37.2 (28 Sep 2022 17:01)  T(F): 97.8 (29 Sep 2022 11:13), Max: 98.9 (28 Sep 2022 17:01)  HR: 68 (29 Sep 2022 11:13) (66 - 69)  BP: 135/79 (29 Sep 2022 11:13) (109/65 - 135/79)  BP(mean): --  RR: 19 (29 Sep 2022 11:13) (18 - 19)  SpO2: 97% (29 Sep 2022 11:13) (95% - 98%)    Parameters below as of 29 Sep 2022 11:13  Patient On (Oxygen Delivery Method): room air    GENERAL: NAD, obese, no increased WOB   HEAD:  Atraumatic, Normocephalic  EYES: EOMI, PERRLA, conjunctiva and sclera clear  ENMT: No tonsillar erythema, exudates, or enlargement; Moist mucous membranes   NECK: Supple, No JVD   NERVOUS SYSTEM:  Alert & Oriented X3, Good concentration; nonfocal   CHEST/LUNG: CTAB  HEART: Regular rate and rhythm; No murmurs, rubs, or gallops  ABDOMEN: Soft, epigastric and RUQ tenderness,  Nondistended; Bowel sounds present  EXTREMITIES:  2+ Peripheral Pulses b/l, No clubbing, cyanosis, calf tenderness or edema b/l         DISCHARGE DIAGNOSES:     Cystitis with bilateral ascending ureteritis and possible pyelonephritis in the right kidney  severe right hydronephrosis , likely  chronic UPJ obstruction.  Mild leukocytosis on admission , normalized   nephrolithiasis   -right flank pain, CVA tenderness +, suprapubic tenderness+  CT with Cystitis with bilateral ascending ureteritis and possible pyelonephritis in the right kidney  UCx : 50-99K E. coli   Blood Cx --NGTD   Continue ceftriaxone 2g daily  ID, urology consult appreciated   no acute  intervention necessary at this time, outpatient [place] renal lasix study        Presumed ELLI POA, most likely post renal   nephrology following   avoid nephrotoxic meds, dose all meds per eGFR   monitor Cr     Transaminitis, improved   patient is s/p cholecystectomy   RUQ tenderness ? improved   Abd US: No evidence for intrahepatic or extrahepatic biliary ductal dilatation.  Marked hydronephrosis with right renal parenchymal   thinning. There is a 1 cm calculus identified within the lower pole   aspect of the right kidney. No space-occupying lesions identified.  Left kidney: 11.9 cm. Mild left-sided hydronephrosis. No space-occupying        Hypothyroidism, unspecified.   - continue synthroid 175mcg.     Hyperlipidemia, unspecified.   c/w simvastatin 20mg hs   repeat LFTs in 1 week with PMD      Severe Morbid obesity BMI 41   pt counselled on diet and lifestyle modification, gradual weight loss, and activity.     GERD--PPI , maalox prn     constipation --MOM x 1 9/28  continue with  senna qhs      3 mm right lower lobe juxtapleural pulmonary nodule  --outpatient follow-up, discussed with patient   denies history of smoking or second hand exposure    Discharge time : 40 min   RETURN PARAMETERS DISCUSSED WITH PATIENT, PATIENT EXPRESSED UNDERSTANDING AND IS AGREEABLE. DISCUSSED WITH PATIENT ON REFRAINING FROM DRIVING UNTIL FOLLOW-UP/ CLEARED BY PMD. PATIENT EXPRESSED UNDERSTANDING. 66 years old female with h/o hypothyroidism, HLD, morbid obesity present to ED with complain of abd pain radiating to right flank. Pain started on Thur, intermittent, 10/10 " severe pain", associated with chills and sweating. No dysuria, frequency or hematuria.    Hemodynamically stable, afebrile, sat well at RA. CXR image reviewed, no focal consolidation. WBC 12.74,Plt 195, K 4, Cr 1.2, lactate 1.4. UA appear dirty. CT abd/pelvis image reviewed, severe right hydronephrosis and marked  parenchymal thinning likely due to chronic UPJ obstruction. Cystitis with bilateral ascending ureteritis and possible pyelonephritis in the right kidney  Admitted with acute pyelonephritis, severe right hydronephrosis    Vital Signs Last 24 Hrs  T(C): 36.6 (29 Sep 2022 11:13), Max: 37.2 (28 Sep 2022 17:01)  T(F): 97.8 (29 Sep 2022 11:13), Max: 98.9 (28 Sep 2022 17:01)  HR: 68 (29 Sep 2022 11:13) (66 - 69)  BP: 135/79 (29 Sep 2022 11:13) (109/65 - 135/79)  BP(mean): --  RR: 19 (29 Sep 2022 11:13) (18 - 19)  SpO2: 97% (29 Sep 2022 11:13) (95% - 98%)    Parameters below as of 29 Sep 2022 11:13  Patient On (Oxygen Delivery Method): room air    GENERAL: NAD, obese, no increased WOB   HEAD:  Atraumatic, Normocephalic  EYES: EOMI, PERRLA, conjunctiva and sclera clear  ENMT: No tonsillar erythema, exudates, or enlargement; Moist mucous membranes   NECK: Supple, No JVD   NERVOUS SYSTEM:  Alert & Oriented X3, Good concentration; nonfocal   CHEST/LUNG: CTAB  HEART: Regular rate and rhythm; No murmurs, rubs, or gallops  ABDOMEN: Soft, epigastric and RUQ tenderness,  Nondistended; Bowel sounds present  EXTREMITIES:  2+ Peripheral Pulses b/l, No clubbing, cyanosis, calf tenderness or edema b/l         DISCHARGE DIAGNOSES:     Cystitis with bilateral ascending ureteritis and possible pyelonephritis in the right kidney  severe right hydronephrosis , likely  chronic UPJ obstruction.  Mild leukocytosis on admission , normalized   nephrolithiasis   -right flank pain, CVA tenderness +, suprapubic tenderness+  CT with Cystitis with bilateral ascending ureteritis and possible pyelonephritis in the right kidney  UCx : 50-99K E. coli   Blood Cx --NGTD    ceftriaxone 2g daily changed to Vantin 200mg bid x 6 more days for total 10 days   ID, urology consult appreciated   no acute  intervention necessary at this time, outpatient [place] renal lasix study        Presumed ELLI POA, most likely post renal   nephrology following   avoid nephrotoxic meds, dose all meds per eGFR   monitor Cr     Transaminitis, improved   patient is s/p cholecystectomy   RUQ tenderness ? improved   Abd US: No evidence for intrahepatic or extrahepatic biliary ductal dilatation.  Marked hydronephrosis with right renal parenchymal   thinning. There is a 1 cm calculus identified within the lower pole   aspect of the right kidney. No space-occupying lesions identified.  Left kidney: 11.9 cm. Mild left-sided hydronephrosis. No space-occupying        Hypothyroidism, unspecified.   - continue synthroid 175mcg.     Hyperlipidemia, unspecified.   c/w simvastatin 20mg hs   repeat LFTs in 1 week with PMD      Severe Morbid obesity BMI 41   pt counselled on diet and lifestyle modification, gradual weight loss, and activity.     GERD--PPI , maalox prn     constipation --MOM x 1 9/28  continue with  senna qhs      3 mm right lower lobe juxtapleural pulmonary nodule  --outpatient follow-up, discussed with patient   denies history of smoking or second hand exposure    Discharge time : 40 min   RETURN PARAMETERS DISCUSSED WITH PATIENT, PATIENT EXPRESSED UNDERSTANDING AND IS AGREEABLE. DISCUSSED WITH PATIENT ON REFRAINING FROM DRIVING UNTIL FOLLOW-UP/ CLEARED BY PMD. PATIENT EXPRESSED UNDERSTANDING. 66 years old female with h/o hypothyroidism, HLD, morbid obesity present to ED with complain of abd pain radiating to right flank. Pain started on Thur, intermittent, 10/10 " severe pain", associated with chills and sweating. No dysuria, frequency or hematuria.    Hemodynamically stable, afebrile, sat well at RA. CXR image reviewed, no focal consolidation. WBC 12.74,Plt 195, K 4, Cr 1.2, lactate 1.4. UA appear dirty. CT abd/pelvis image reviewed, severe right hydronephrosis and marked  parenchymal thinning likely due to chronic UPJ obstruction. Cystitis with bilateral ascending ureteritis and possible pyelonephritis in the right kidney  Admitted with acute pyelonephritis, severe right hydronephrosis    Vital Signs Last 24 Hrs  T(C): 36.6 (29 Sep 2022 11:13), Max: 37.2 (28 Sep 2022 17:01)  T(F): 97.8 (29 Sep 2022 11:13), Max: 98.9 (28 Sep 2022 17:01)  HR: 68 (29 Sep 2022 11:13) (66 - 69)  BP: 135/79 (29 Sep 2022 11:13) (109/65 - 135/79)  BP(mean): --  RR: 19 (29 Sep 2022 11:13) (18 - 19)  SpO2: 97% (29 Sep 2022 11:13) (95% - 98%)    Parameters below as of 29 Sep 2022 11:13  Patient On (Oxygen Delivery Method): room air    GENERAL: NAD, obese, no increased WOB   HEAD:  Atraumatic, Normocephalic  EYES: EOMI, PERRLA, conjunctiva and sclera clear  ENMT: No tonsillar erythema, exudates, or enlargement; Moist mucous membranes   NECK: Supple, No JVD   NERVOUS SYSTEM:  Alert & Oriented X3, Good concentration; nonfocal   CHEST/LUNG: CTAB  HEART: Regular rate and rhythm; No murmurs, rubs, or gallops  ABDOMEN: Soft, epigastric and RUQ tenderness,  Nondistended; Bowel sounds present  EXTREMITIES:  2+ Peripheral Pulses b/l, No clubbing, cyanosis, calf tenderness or edema b/l         DISCHARGE DIAGNOSES:     Cystitis with bilateral ascending ureteritis and possible pyelonephritis in the right kidney  severe right hydronephrosis , likely  chronic UPJ obstruction.  Mild leukocytosis on admission , normalized   nephrolithiasis   -right flank pain, CVA tenderness +, suprapubic tenderness+  CT with Cystitis with bilateral ascending ureteritis and possible pyelonephritis in the right kidney  UCx : 50-99K E. coli   Blood Cx --NGTD    ceftriaxone 2g daily changed to Vantin 200mg bid x 6 more days for total 10 days   ID, urology consult appreciated   no acute  intervention necessary at this time, outpatient [place] renal lasix study        Presumed ELLI POA, most likely post renal   resolved     Transaminitis, improved   patient is s/p cholecystectomy   RUQ tenderness ? improved   Abd US: No evidence for intrahepatic or extrahepatic biliary ductal dilatation.  Marked hydronephrosis with right renal parenchymal   thinning. There is a 1 cm calculus identified within the lower pole   aspect of the right kidney. No space-occupying lesions identified.  Left kidney: 11.9 cm. Mild left-sided hydronephrosis. No space-occupying        Hypothyroidism, unspecified.   - continue synthroid 175mcg.     Hyperlipidemia, unspecified.   c/w simvastatin 20mg hs   repeat LFTs in 1 week with PMD      Severe Morbid obesity BMI 41   pt counselled on diet and lifestyle modification, gradual weight loss, and activity.     GERD--PPI , maalox prn     constipation --MOM x 1 9/28  continue with  senna qhs      3 mm right lower lobe juxtapleural pulmonary nodule  --outpatient follow-up, discussed with patient   denies history of smoking or second hand exposure    Discharge time : 40 min   RETURN PARAMETERS DISCUSSED WITH PATIENT, PATIENT EXPRESSED UNDERSTANDING AND IS AGREEABLE. DISCUSSED WITH PATIENT ON REFRAINING FROM DRIVING UNTIL FOLLOW-UP/ CLEARED BY PMD. PATIENT EXPRESSED UNDERSTANDING.

## 2022-09-29 NOTE — DISCHARGE NOTE NURSING/CASE MANAGEMENT/SOCIAL WORK - PATIENT PORTAL LINK FT
You can access the FollowMyHealth Patient Portal offered by [facility] by registering at the following website: [email or web address]. By joining [facility]’s FollowMyHealth portal, you will also be able to view your health information using other applications (apps) compatible with our system.

## 2022-09-29 NOTE — PROGRESS NOTE ADULT - SUBJECTIVE AND OBJECTIVE BOX
DOMITILA SHAHID  MRN-[id number]    Follow Up:  pyelonephritis     Interval History: the pt was seen and examined earlier, no distress, no new complaints, states that she is feeling much better. The pt is afebrile, no new lab work.     PAST MEDICAL & SURGICAL HISTORY:  Hypothyroid      Insomnia      Dyslipidemia      Asthma      S/P  section      S/P lumbar laminectomy      Cataract  RIGHT  EYE 2010          ROS:    [ ] Unobtainable because:  [ x All other systems negative    Constitutional: no fever, no chills  Head: no trauma  Eyes: no vision changes, no eye pain  ENT:  no sore throat, no rhinorrhea  Cardiovascular:  no chest pain, no palpitation  Respiratory:  no SOB, no cough  GI:  no abd pain, no vomiting, no diarrhea  urinary: no dysuria, no hematuria, no flank pain  musculoskeletal:  no joint pain, no joint swelling  skin:  no rash  neurology:  no headache, no seizure, no change in mental status  psych: no anxiety, no depression         Allergies  No Known Allergies        ANTIMICROBIALS:  cefTRIAXone   IVPB 2000 every 24 hours      OTHER MEDS:  acetaminophen     Tablet .. 650 milliGRAM(s) Oral every 6 hours PRN  aluminum hydroxide/magnesium hydroxide/simethicone Suspension 30 milliLiter(s) Oral every 4 hours PRN  enoxaparin Injectable 40 milliGRAM(s) SubCutaneous every 12 hours  influenza  Vaccine (HIGH DOSE) 0.7 milliLiter(s) IntraMuscular once  levothyroxine 175 MICROGram(s) Oral daily  magnesium hydroxide Suspension 30 milliLiter(s) Oral daily PRN  melatonin 3 milliGRAM(s) Oral at bedtime PRN  ondansetron Injectable 4 milliGRAM(s) IV Push every 8 hours PRN  pantoprazole    Tablet 40 milliGRAM(s) Oral before breakfast  senna 2 Tablet(s) Oral at bedtime      Vital Signs Last 24 Hrs  T(C): 36.6 (29 Sep 2022 11:13), Max: 37.2 (28 Sep 2022 17:01)  T(F): 97.8 (29 Sep 2022 11:13), Max: 98.9 (28 Sep 2022 17:01)  HR: 68 (29 Sep 2022 11:13) (66 - 69)  BP: 135/79 (29 Sep 2022 11:13) (109/65 - 135/79)  BP(mean): --  RR: 19 (29 Sep 2022 11:13) (18 - 19)  SpO2: 97% (29 Sep 2022 11:13) (95% - 98%)    Parameters below as of 29 Sep 2022 11:13  Patient On (Oxygen Delivery Method): room air        Physical Exam:  Constitutional: non-toxic, no distress  HEAD/EYES: anicteric, no conjunctival injection  ENT:  supple, no thrush  Cardiovascular:   normal S1, S2, no murmur, no edema  Respiratory:  clear BS bilaterally, no wheezes, no rales  GI:  soft, non-tender, normal bowel sounds, obese abdomen   :  no baez, no CVA tenderness  Musculoskeletal:  no synovitis, normal ROM  Neurologic: awake and alert, normal strength, no focal findings  Skin:  no rash, no erythema, no phlebitis  Heme/Onc: no lymphadenopathy   Psychiatric:  awake, alert, appropriate mood    WBC Count: 9.73 K/uL ( @ 06:10)  WBC Count: 8.03 K/uL ( @ 07:38)  WBC Count: 12.74 K/uL ( @ 03:26)                            11.3   9.73  )-----------( 200      ( 28 Sep 2022 06:10 )             36.6           140  |  107  |  20  ----------------------------<  108<H>  4.8   |  29  |  0.95    Ca    8.8      29 Sep 2022 06:22    TPro  6.0  /  Alb  2.3<L>  /  TBili  0.2  /  DBili  x   /  AST  24  /  ALT  120<H>  /  AlkPhos  181<H>            Creatinine Trend: 0.95<--, 0.97<--, 0.99<--, 1.20<--      MICROBIOLOGY:  v  .Blood Blood-Peripheral  22   No growth to date.  --  --      .Blood Blood-Peripheral  22   No growth to date.  --  --      Clean Catch Clean Catch (Midstream)  22   50,000 - 99,000 CFU/mL Escherichia coli  <10,000 CFU/ml Normal Urogenital ro present  --  Escherichia coli      SARS-CoV-2 Result: NotDetec (22 @ 10:00)      RADIOLOGY:    < from: US Abdomen Complete (22 @ 11:50) >    ACC: [id number] EXAM:  US ABDOMEN COMPLETE                          PROCEDURE DATE:  2022          INTERPRETATION:  CLINICAL INFORMATION: Elevated LFTs.    COMPARISON: CT abdomen/pelvis 2022.    TECHNIQUE: Sonography of the abdomen.    FINDINGS:  Liver: Within normal limits. No focal hepatic masses identified.  Bile ducts: Normal caliber. Common bile duct measures 4 mm.  Gallbladder: Cholecystectomy.  Pancreas: Visualized portions are within normal limits.  Spleen: 12.5 cm. Within normal limits.  Right kidney: 16.0 cm. Marked hydronephrosis with right renal parenchymal   thinning. There is a 1 cm calculus identified within the lower pole   aspect of the right kidney. No space-occupying lesions identified.  Left kidney: 11.9 cm. Mild left-sided hydronephrosis. No space-occupying   lesions. No renal calculi.  Ascites: None.  Aorta and IVC: Poorly visualized.    IMPRESSION:  No focal hepatic masses. No evidence for intrahepatic or extrahepatic   biliary ductal dilatation. Cholecystectomy. Marked right-sided   hydronephrosis with right renal parenchymal thinning. Right renal   calculus.        --- End of Report ---            BRITTANY SMITH MD; Attending Radiologist  This document has been electronically signed. Sep 28 2022 12:07PM    < end of copied text >

## 2022-09-29 NOTE — PROGRESS NOTE ADULT - ASSESSMENT
66F presenting with pyleo, found to have chronic R UPJ obstruction on CT      - no acute  intervention necessary at this time  - medical mgmt for elevated LFTs  - outpatient NM renal lasix study  -Please include office information for Dr. Josue for followup

## 2022-10-01 LAB
CULTURE RESULTS: SIGNIFICANT CHANGE UP
CULTURE RESULTS: SIGNIFICANT CHANGE UP
SPECIMEN SOURCE: SIGNIFICANT CHANGE UP
SPECIMEN SOURCE: SIGNIFICANT CHANGE UP

## 2022-10-04 DIAGNOSIS — E66.01 MORBID (SEVERE) OBESITY DUE TO EXCESS CALORIES: ICD-10-CM

## 2022-10-04 DIAGNOSIS — N11.1 CHRONIC OBSTRUCTIVE PYELONEPHRITIS: ICD-10-CM

## 2022-10-04 DIAGNOSIS — E03.9 HYPOTHYROIDISM, UNSPECIFIED: ICD-10-CM

## 2022-10-04 DIAGNOSIS — N30.90 CYSTITIS, UNSPECIFIED WITHOUT HEMATURIA: ICD-10-CM

## 2022-10-04 DIAGNOSIS — B96.20 UNSPECIFIED ESCHERICHIA COLI [E. COLI] AS THE CAUSE OF DISEASES CLASSIFIED ELSEWHERE: ICD-10-CM

## 2022-10-04 DIAGNOSIS — R74.01 ELEVATION OF LEVELS OF LIVER TRANSAMINASE LEVELS: ICD-10-CM

## 2022-10-04 DIAGNOSIS — R91.1 SOLITARY PULMONARY NODULE: ICD-10-CM

## 2022-10-04 DIAGNOSIS — K57.90 DIVERTICULOSIS OF INTESTINE, PART UNSPECIFIED, WITHOUT PERFORATION OR ABSCESS WITHOUT BLEEDING: ICD-10-CM

## 2022-10-04 DIAGNOSIS — N17.9 ACUTE KIDNEY FAILURE, UNSPECIFIED: ICD-10-CM

## 2022-10-04 DIAGNOSIS — K59.00 CONSTIPATION, UNSPECIFIED: ICD-10-CM

## 2022-10-04 DIAGNOSIS — E78.5 HYPERLIPIDEMIA, UNSPECIFIED: ICD-10-CM

## 2022-10-04 DIAGNOSIS — G47.00 INSOMNIA, UNSPECIFIED: ICD-10-CM

## 2022-10-04 DIAGNOSIS — J45.909 UNSPECIFIED ASTHMA, UNCOMPLICATED: ICD-10-CM

## 2022-10-04 DIAGNOSIS — R10.9 UNSPECIFIED ABDOMINAL PAIN: ICD-10-CM

## 2022-10-04 DIAGNOSIS — K21.9 GASTRO-ESOPHAGEAL REFLUX DISEASE WITHOUT ESOPHAGITIS: ICD-10-CM

## 2022-10-04 DIAGNOSIS — Z79.890 HORMONE REPLACEMENT THERAPY: ICD-10-CM

## 2022-10-07 ENCOUNTER — APPOINTMENT (OUTPATIENT)
Dept: UROLOGY | Facility: CLINIC | Age: 67
End: 2022-10-07

## 2022-10-07 VITALS
DIASTOLIC BLOOD PRESSURE: 79 MMHG | OXYGEN SATURATION: 95 % | HEIGHT: 60 IN | WEIGHT: 210 LBS | BODY MASS INDEX: 41.23 KG/M2 | HEART RATE: 80 BPM | SYSTOLIC BLOOD PRESSURE: 134 MMHG | TEMPERATURE: 98.3 F

## 2022-10-07 PROCEDURE — 99213 OFFICE O/P EST LOW 20 MIN: CPT

## 2022-10-07 NOTE — HISTORY OF PRESENT ILLNESS
[FreeTextEntry1] : 65y/o female who referred to ER for UTI, treated with ABX.\par The patient has documented bilateral UPJ obstruction. \par On last CT scan right kidney has thin parenchymal tissue. Left kidney apparently regular, with hydronephrosis.\par Comes to office for planning.\par

## 2022-10-07 NOTE — ASSESSMENT
[FreeTextEntry1] : 65y/o female who referred to ER for UTI, treated with ABX.\par The patient has documented bilateral UPJ obstruction. \par On last CT scan right kidney has thin parenchymal tissue. Left kidney apparently regular, with hydronephrosis.\par Comes to office for planning.\par \par Creatinine level is normal. \par Ordering NM renal with lasix to study kidney function and evaluate eventual surgical treatment.

## 2022-10-10 ENCOUNTER — APPOINTMENT (OUTPATIENT)
Dept: ULTRASOUND IMAGING | Facility: CLINIC | Age: 67
End: 2022-10-10

## 2022-10-10 ENCOUNTER — OUTPATIENT (OUTPATIENT)
Dept: OUTPATIENT SERVICES | Facility: HOSPITAL | Age: 67
LOS: 1 days | End: 2022-10-10

## 2022-10-10 DIAGNOSIS — Z98.89 OTHER SPECIFIED POSTPROCEDURAL STATES: Chronic | ICD-10-CM

## 2022-10-10 DIAGNOSIS — Z00.8 ENCOUNTER FOR OTHER GENERAL EXAMINATION: ICD-10-CM

## 2022-10-10 DIAGNOSIS — H26.9 UNSPECIFIED CATARACT: Chronic | ICD-10-CM

## 2022-10-10 DIAGNOSIS — R60.0 LOCALIZED EDEMA: ICD-10-CM

## 2022-10-13 ENCOUNTER — APPOINTMENT (OUTPATIENT)
Dept: NUCLEAR MEDICINE | Facility: HOSPITAL | Age: 67
End: 2022-10-13

## 2022-10-13 ENCOUNTER — OUTPATIENT (OUTPATIENT)
Dept: OUTPATIENT SERVICES | Facility: HOSPITAL | Age: 67
LOS: 1 days | End: 2022-10-13
Payer: MEDICARE

## 2022-10-13 DIAGNOSIS — Q62.39 OTHER OBSTRUCTIVE DEFECTS OF RENAL PELVIS AND URETER: ICD-10-CM

## 2022-10-13 DIAGNOSIS — Z98.89 OTHER SPECIFIED POSTPROCEDURAL STATES: Chronic | ICD-10-CM

## 2022-10-13 DIAGNOSIS — H26.9 UNSPECIFIED CATARACT: Chronic | ICD-10-CM

## 2022-10-13 PROCEDURE — A9562: CPT

## 2022-10-13 PROCEDURE — 51702 INSERT TEMP BLADDER CATH: CPT

## 2022-10-13 PROCEDURE — 78708 K FLOW/FUNCT IMAGE W/DRUG: CPT | Mod: 26,MH

## 2022-10-13 PROCEDURE — 78708 K FLOW/FUNCT IMAGE W/DRUG: CPT

## 2022-10-21 ENCOUNTER — APPOINTMENT (OUTPATIENT)
Dept: UROLOGY | Facility: CLINIC | Age: 67
End: 2022-10-21

## 2022-10-21 VITALS
DIASTOLIC BLOOD PRESSURE: 85 MMHG | OXYGEN SATURATION: 98 % | SYSTOLIC BLOOD PRESSURE: 164 MMHG | HEART RATE: 84 BPM | TEMPERATURE: 98 F

## 2022-10-21 PROCEDURE — 99213 OFFICE O/P EST LOW 20 MIN: CPT | Mod: 57

## 2022-10-21 NOTE — ASSESSMENT
[FreeTextEntry1] : 65y/o female who referred to ER for UTI, treated with ABX.\par The patient has documented bilateral UPJ obstruction. \par On last CT scan right kidney has thin parenchymal tissue. Left kidney apparently regular, with hydronephrosis.\par \par On Renal MRI , differential renal function: right kidney 25%, left kidney 75%.\par Both kidneys have diminished kidney function due to obstructive condition.\par Right kidney with severe obstruction\par Left kidney with mild obstruction. \par \par Creatinine level is normal. \par \par The patient is made aware of the risk that bilateral kidney function is worsening over time with the actual obstruction condition.\par Robot assisted left pyeloplasty is suggested as the best treatment option, with right ureteral stent placement to optimize the contralateral function and evaluate eventual right pyeloplasty.\par The procedures are explained with video summary\par The patient is booked for surgery.

## 2022-10-21 NOTE — HISTORY OF PRESENT ILLNESS
[FreeTextEntry1] : 67y/o female who referred to ER for UTI, treated with ABX.\par The patient has documented bilateral UPJ obstruction. \par On last CT scan right kidney has thin parenchymal tissue. Left kidney apparently regular, with hydronephrosis.\par \par On Renal MRI , differential renal function: right kidney 25%, left kidney 75%.\par Both kidneys have diminished kidney function due to obstructive condition.\par Right kidney with severe obstruction\par Left kidney with mild obstruction. \par \par

## 2022-11-17 ENCOUNTER — OUTPATIENT (OUTPATIENT)
Dept: OUTPATIENT SERVICES | Facility: HOSPITAL | Age: 67
LOS: 1 days | End: 2022-11-17

## 2022-11-17 VITALS
RESPIRATION RATE: 16 BRPM | HEART RATE: 82 BPM | HEIGHT: 57.5 IN | SYSTOLIC BLOOD PRESSURE: 148 MMHG | OXYGEN SATURATION: 98 % | DIASTOLIC BLOOD PRESSURE: 84 MMHG | WEIGHT: 210.1 LBS | TEMPERATURE: 98 F

## 2022-11-17 DIAGNOSIS — Z98.89 OTHER SPECIFIED POSTPROCEDURAL STATES: Chronic | ICD-10-CM

## 2022-11-17 DIAGNOSIS — Q62.39 OTHER OBSTRUCTIVE DEFECTS OF RENAL PELVIS AND URETER: ICD-10-CM

## 2022-11-17 DIAGNOSIS — H26.9 UNSPECIFIED CATARACT: Chronic | ICD-10-CM

## 2022-11-17 DIAGNOSIS — Z91.89 OTHER SPECIFIED PERSONAL RISK FACTORS, NOT ELSEWHERE CLASSIFIED: ICD-10-CM

## 2022-11-17 DIAGNOSIS — E03.9 HYPOTHYROIDISM, UNSPECIFIED: ICD-10-CM

## 2022-11-17 DIAGNOSIS — Z96.659 PRESENCE OF UNSPECIFIED ARTIFICIAL KNEE JOINT: Chronic | ICD-10-CM

## 2022-11-17 LAB
BLD GP AB SCN SERPL QL: NEGATIVE — SIGNIFICANT CHANGE UP
RH IG SCN BLD-IMP: POSITIVE — SIGNIFICANT CHANGE UP

## 2022-11-17 RX ORDER — SODIUM CHLORIDE 9 MG/ML
1000 INJECTION, SOLUTION INTRAVENOUS
Refills: 0 | Status: DISCONTINUED | OUTPATIENT
Start: 2022-12-15 | End: 2022-12-16

## 2022-11-17 RX ORDER — CHOLECALCIFEROL (VITAMIN D3) 125 MCG
0 CAPSULE ORAL
Qty: 0 | Refills: 0 | DISCHARGE

## 2022-11-17 NOTE — H&P PST ADULT - MUSCULOSKELETAL
normal/ROM intact/strength 5/5 bilateral upper extremities/strength 5/5 bilateral lower extremities/back exam details…

## 2022-11-17 NOTE — H&P PST ADULT - PROBLEM SELECTOR PLAN 1
Patient tentatively scheduled for right cystoscopy and stent placement, left robotic assisted pyeloplasty for 12/1/22. Pre-op instructions provided. Pt given verbal and written instructions with teach back on chlorhexidine shampoo and pepcid. Pt verbalized understanding with return demonstration.     Pt instructed to obtain a COVID-19 PCR 3-5 days prior to the procedure. COVID-19 PCR order placed. Pt was provided with a list of COVID-19 PCR swabbing locations and hours of operation. Pt stated understanding.    65 y/o female intermediate risk patient (morbid obesity) scheduled for an intermediate risk procedure.  VSS, ekg NSR, good mets. No further evaluations requested.

## 2022-11-17 NOTE — H&P PST ADULT - HISTORY OF PRESENT ILLNESS
65 y/o female PMH obesity HLD and hypothyroidism presents to presurgical testing with diagnosis of other obstructive defects of renal pelvis and ureter scheduled for right cystoscopy and stent placement, left robotic assisted pyeloplasty. Pt was evaluated at [facility] ED for abdominal pain. CT revealing Right UPJ obstruction.

## 2022-11-17 NOTE — H&P PST ADULT - NSICDXPASTSURGICALHX_GEN_ALL_CORE_FT
PAST SURGICAL HISTORY:  Cataract b/l surgery    H/O total knee replacement b/l    S/P  section     S/P lumbar laminectomy

## 2022-11-17 NOTE — H&P PST ADULT - PROBLEM SELECTOR PLAN 2
Retinal tear and detachment warning symptoms reviewed and patient instructed to call immediately if increasing floaters, flashes, or decreasing peripheral vision. Patient instructed to take levothyroxine with a sip of water on the morning of procedure.

## 2022-11-17 NOTE — H&P PST ADULT - NSANTHOSAYNRD_GEN_A_CORE
No. JESSIKA screening performed.  STOP BANG Legend: 0-2 = LOW Risk; 3-4 = INTERMEDIATE Risk; 5-8 = HIGH Risk

## 2022-11-17 NOTE — H&P PST ADULT - NSICDXPASTMEDICALHX_GEN_ALL_CORE_FT
PAST MEDICAL HISTORY:  Arthritis     Asthma     Dyslipidemia     Hypothyroid     Insomnia     Other obstructive defects of renal pelvis and ureter     UPJ (ureteropelvic junction) obstruction

## 2022-11-18 LAB
CULTURE RESULTS: SIGNIFICANT CHANGE UP
SPECIMEN SOURCE: SIGNIFICANT CHANGE UP

## 2022-11-29 DIAGNOSIS — Z01.818 ENCOUNTER FOR OTHER PREPROCEDURAL EXAMINATION: ICD-10-CM

## 2022-12-07 ENCOUNTER — OUTPATIENT (OUTPATIENT)
Dept: OUTPATIENT SERVICES | Facility: HOSPITAL | Age: 67
LOS: 1 days | End: 2022-12-07

## 2022-12-07 DIAGNOSIS — H26.9 UNSPECIFIED CATARACT: Chronic | ICD-10-CM

## 2022-12-07 DIAGNOSIS — Z96.659 PRESENCE OF UNSPECIFIED ARTIFICIAL KNEE JOINT: Chronic | ICD-10-CM

## 2022-12-07 DIAGNOSIS — Q62.39 OTHER OBSTRUCTIVE DEFECTS OF RENAL PELVIS AND URETER: ICD-10-CM

## 2022-12-07 DIAGNOSIS — Z98.89 OTHER SPECIFIED POSTPROCEDURAL STATES: Chronic | ICD-10-CM

## 2022-12-07 PROBLEM — N13.5 CROSSING VESSEL AND STRICTURE OF URETER WITHOUT HYDRONEPHROSIS: Chronic | Status: ACTIVE | Noted: 2022-11-17

## 2022-12-07 PROBLEM — M19.90 UNSPECIFIED OSTEOARTHRITIS, UNSPECIFIED SITE: Chronic | Status: ACTIVE | Noted: 2022-11-17

## 2022-12-14 ENCOUNTER — TRANSCRIPTION ENCOUNTER (OUTPATIENT)
Age: 67
End: 2022-12-14

## 2022-12-14 NOTE — ASU PATIENT PROFILE, ADULT - AS SC BRADEN FRICTION
Patient attended Phase 2 Cardiac Rehab Exercise Session. Further documentation will be completed in Cardiac Science/[employer] System and will be scanned into the medical record upon discharge.   (3) no apparent problem

## 2022-12-14 NOTE — ASU PATIENT PROFILE, ADULT - FALL HARM RISK - RISK INTERVENTIONS

## 2022-12-15 ENCOUNTER — INPATIENT (INPATIENT)
Facility: HOSPITAL | Age: 67
LOS: 0 days | Discharge: HOME | End: 2022-12-16
Attending: UROLOGY | Admitting: UROLOGY

## 2022-12-15 ENCOUNTER — RESULT REVIEW (OUTPATIENT)
Age: 67
End: 2022-12-15

## 2022-12-15 ENCOUNTER — TRANSCRIPTION ENCOUNTER (OUTPATIENT)
Age: 67
End: 2022-12-15

## 2022-12-15 ENCOUNTER — APPOINTMENT (OUTPATIENT)
Dept: UROLOGY | Facility: HOSPITAL | Age: 67
End: 2022-12-15

## 2022-12-15 VITALS
OXYGEN SATURATION: 95 % | SYSTOLIC BLOOD PRESSURE: 153 MMHG | TEMPERATURE: 98 F | WEIGHT: 210.1 LBS | HEART RATE: 83 BPM | DIASTOLIC BLOOD PRESSURE: 79 MMHG | RESPIRATION RATE: 16 BRPM | HEIGHT: 57.5 IN

## 2022-12-15 DIAGNOSIS — H26.9 UNSPECIFIED CATARACT: Chronic | ICD-10-CM

## 2022-12-15 DIAGNOSIS — Z96.659 PRESENCE OF UNSPECIFIED ARTIFICIAL KNEE JOINT: Chronic | ICD-10-CM

## 2022-12-15 DIAGNOSIS — Q62.39 OTHER OBSTRUCTIVE DEFECTS OF RENAL PELVIS AND URETER: ICD-10-CM

## 2022-12-15 DIAGNOSIS — Z98.89 OTHER SPECIFIED POSTPROCEDURAL STATES: Chronic | ICD-10-CM

## 2022-12-15 PROCEDURE — 52332 CYSTOSCOPY AND TREATMENT: CPT | Mod: 50

## 2022-12-15 PROCEDURE — 88305 TISSUE EXAM BY PATHOLOGIST: CPT | Mod: 26

## 2022-12-15 PROCEDURE — 99232 SBSQ HOSP IP/OBS MODERATE 35: CPT

## 2022-12-15 PROCEDURE — 50544 LAPAROSCOPY PYELOPLASTY: CPT | Mod: LT

## 2022-12-15 DEVICE — GUIDEWIRE SENSOR DUAL-FLEX NITINOL STRAIGHT .038" X 150CM: Type: IMPLANTABLE DEVICE | Status: FUNCTIONAL

## 2022-12-15 DEVICE — URETERAL CATH AXXCESS OPEN END 6FR 70CM: Type: IMPLANTABLE DEVICE | Status: FUNCTIONAL

## 2022-12-15 DEVICE — URETERAL STENT PERCUFLEX PLUS 6FR 26CM: Type: IMPLANTABLE DEVICE | Status: FUNCTIONAL

## 2022-12-15 RX ORDER — ACETAMINOPHEN 500 MG
975 TABLET ORAL EVERY 6 HOURS
Refills: 0 | Status: DISCONTINUED | OUTPATIENT
Start: 2022-12-15 | End: 2022-12-16

## 2022-12-15 RX ORDER — SENNA PLUS 8.6 MG/1
2 TABLET ORAL AT BEDTIME
Refills: 0 | Status: DISCONTINUED | OUTPATIENT
Start: 2022-12-15 | End: 2022-12-16

## 2022-12-15 RX ORDER — ONDANSETRON 8 MG/1
4 TABLET, FILM COATED ORAL EVERY 6 HOURS
Refills: 0 | Status: DISCONTINUED | OUTPATIENT
Start: 2022-12-15 | End: 2022-12-16

## 2022-12-15 RX ORDER — SIMVASTATIN 20 MG/1
20 TABLET, FILM COATED ORAL AT BEDTIME
Refills: 0 | Status: DISCONTINUED | OUTPATIENT
Start: 2022-12-15 | End: 2022-12-16

## 2022-12-15 RX ORDER — CHOLECALCIFEROL (VITAMIN D3) 125 MCG
1000 CAPSULE ORAL DAILY
Refills: 0 | Status: DISCONTINUED | OUTPATIENT
Start: 2022-12-15 | End: 2022-12-16

## 2022-12-15 RX ORDER — ONDANSETRON 8 MG/1
4 TABLET, FILM COATED ORAL ONCE
Refills: 0 | Status: COMPLETED | OUTPATIENT
Start: 2022-12-15 | End: 2022-12-15

## 2022-12-15 RX ORDER — TAMSULOSIN HYDROCHLORIDE 0.4 MG/1
0.4 CAPSULE ORAL AT BEDTIME
Refills: 0 | Status: DISCONTINUED | OUTPATIENT
Start: 2022-12-15 | End: 2022-12-16

## 2022-12-15 RX ORDER — CEFAZOLIN SODIUM 1 G
1000 VIAL (EA) INJECTION EVERY 8 HOURS
Refills: 0 | Status: DISCONTINUED | OUTPATIENT
Start: 2022-12-15 | End: 2022-12-16

## 2022-12-15 RX ORDER — LEVOTHYROXINE SODIUM 125 MCG
175 TABLET ORAL DAILY
Refills: 0 | Status: DISCONTINUED | OUTPATIENT
Start: 2022-12-16 | End: 2022-12-16

## 2022-12-15 RX ORDER — CALCIUM CHLORIDE, MAGNESIUM CHLORIDE, POTASSIUM CHLORIDE, SODIUM ACETATE, SODIUM CHLORIDE, SODIUM CITRATE .48; .3; .75; 3.9; 6.4; 1.7 MG/ML; MG/ML; MG/ML; MG/ML; MG/ML; MG/ML
1 SOLUTION OPHTHALMIC ONCE
Refills: 0 | Status: COMPLETED | OUTPATIENT
Start: 2022-12-15 | End: 2022-12-15

## 2022-12-15 RX ORDER — HEPARIN SODIUM 5000 [USP'U]/ML
5000 INJECTION INTRAVENOUS; SUBCUTANEOUS EVERY 8 HOURS
Refills: 0 | Status: DISCONTINUED | OUTPATIENT
Start: 2022-12-15 | End: 2022-12-16

## 2022-12-15 RX ORDER — HYDROMORPHONE HYDROCHLORIDE 2 MG/ML
0.5 INJECTION INTRAMUSCULAR; INTRAVENOUS; SUBCUTANEOUS
Refills: 0 | Status: DISCONTINUED | OUTPATIENT
Start: 2022-12-15 | End: 2022-12-15

## 2022-12-15 RX ORDER — KETOROLAC TROMETHAMINE 30 MG/ML
15 SYRINGE (ML) INJECTION EVERY 6 HOURS
Refills: 0 | Status: COMPLETED | OUTPATIENT
Start: 2022-12-15 | End: 2022-12-16

## 2022-12-15 RX ORDER — TOBRAMYCIN 0.3 %
1 DROPS OPHTHALMIC (EYE) EVERY 4 HOURS
Refills: 0 | Status: COMPLETED | OUTPATIENT
Start: 2022-12-15 | End: 2022-12-15

## 2022-12-15 RX ADMIN — Medication 15 MILLIGRAM(S): at 12:25

## 2022-12-15 RX ADMIN — HYDROMORPHONE HYDROCHLORIDE 0.5 MILLIGRAM(S): 2 INJECTION INTRAMUSCULAR; INTRAVENOUS; SUBCUTANEOUS at 12:48

## 2022-12-15 RX ADMIN — Medication 1 DROP(S): at 13:03

## 2022-12-15 RX ADMIN — Medication 1 DROP(S): at 22:06

## 2022-12-15 RX ADMIN — Medication 1 APPLICATION(S): at 22:07

## 2022-12-15 RX ADMIN — Medication 1 DROP(S): at 17:14

## 2022-12-15 RX ADMIN — HEPARIN SODIUM 5000 UNIT(S): 5000 INJECTION INTRAVENOUS; SUBCUTANEOUS at 22:05

## 2022-12-15 RX ADMIN — Medication 975 MILLIGRAM(S): at 22:06

## 2022-12-15 RX ADMIN — Medication 1 DROP(S): at 17:05

## 2022-12-15 RX ADMIN — HEPARIN SODIUM 5000 UNIT(S): 5000 INJECTION INTRAVENOUS; SUBCUTANEOUS at 13:03

## 2022-12-15 RX ADMIN — CALCIUM CHLORIDE, MAGNESIUM CHLORIDE, POTASSIUM CHLORIDE, SODIUM ACETATE, SODIUM CHLORIDE, SODIUM CITRATE 1 APPLICATION(S): .48; .3; .75; 3.9; 6.4; 1.7 SOLUTION OPHTHALMIC at 12:45

## 2022-12-15 RX ADMIN — Medication 1 DROP(S): at 12:45

## 2022-12-15 RX ADMIN — Medication 100 MILLIGRAM(S): at 22:06

## 2022-12-15 RX ADMIN — Medication 1000 UNIT(S): at 17:04

## 2022-12-15 RX ADMIN — Medication 975 MILLIGRAM(S): at 15:57

## 2022-12-15 RX ADMIN — SIMVASTATIN 20 MILLIGRAM(S): 20 TABLET, FILM COATED ORAL at 22:06

## 2022-12-15 RX ADMIN — HYDROMORPHONE HYDROCHLORIDE 0.5 MILLIGRAM(S): 2 INJECTION INTRAMUSCULAR; INTRAVENOUS; SUBCUTANEOUS at 13:00

## 2022-12-15 RX ADMIN — Medication 15 MILLIGRAM(S): at 17:06

## 2022-12-15 RX ADMIN — ONDANSETRON 4 MILLIGRAM(S): 8 TABLET, FILM COATED ORAL at 11:51

## 2022-12-15 RX ADMIN — Medication 975 MILLIGRAM(S): at 15:27

## 2022-12-15 RX ADMIN — TAMSULOSIN HYDROCHLORIDE 0.4 MILLIGRAM(S): 0.4 CAPSULE ORAL at 22:06

## 2022-12-15 NOTE — CHART NOTE - NSCHARTNOTEFT_GEN_A_CORE
Post op Check: 65 yo F POD #0 cysto, right stent placement, left pyeloplasty, left stent placement    Pt seen and examined, c/o irritation left eye, was given one dose of tetracaine in PACU and now on tobramycin x 3 doses, otherwise pain is controlled. Denies SOB/CP/N/V.     Vital Signs Last 24 Hrs  T(C): 36.7 (15 Dec 2022 14:38), Max: 36.8 (15 Dec 2022 06:30)  T(F): 98.1 (15 Dec 2022 14:38), Max: 98.2 (15 Dec 2022 06:30)  HR: 89 (15 Dec 2022 14:38) (72 - 89)  BP: 138/71 (15 Dec 2022 14:38) (122/55 - 153/79)  BP(mean): 71 (15 Dec 2022 14:00) (66 - 104)  RR: 17 (15 Dec 2022 14:38) (15 - 20)  SpO2: 97% (15 Dec 2022 14:38) (93% - 98%)    Parameters below as of 15 Dec 2022 14:38  Patient On (Oxygen Delivery Method): room air        I&Os Summary  Al: 385 yellow  NEYMAR: 50    15 Dec 2022 07:01  -  15 Dec 2022 16:40  --------------------------------------------------------  IN: 600 mL / OUT: 435 mL / NET: 165 mL        Physical Exam  Gen: NAD  Pulm: No respiratory distress, clear to auscultation  CV: Reg  Abd: Dressings c/d/i, soft, NT, ND  : Melendez secured  Venodynes: In place                  Plan:   IVF: LR @ 125  Diet: CLD  Labs: In AM  Abx: Ancef  Repeat tetracaine, lacrilube once tobramycin completed  f/u medicine  Strict I&Os  Analgesia and antiemetics as needed  DVT prophylaxis/OOB/Incentive spirometry  Post op Check: 65 yo F POD #0 cysto, right stent placement, left pyeloplasty, left stent placement    Pt seen and examined, c/o irritation left eye, was given one dose of tetracaine in PACU and now on tobramycin x 3 doses, otherwise pain is controlled. Denies SOB/CP/N/V.     Vital Signs Last 24 Hrs  T(C): 36.7 (15 Dec 2022 14:38), Max: 36.8 (15 Dec 2022 06:30)  T(F): 98.1 (15 Dec 2022 14:38), Max: 98.2 (15 Dec 2022 06:30)  HR: 89 (15 Dec 2022 14:38) (72 - 89)  BP: 138/71 (15 Dec 2022 14:38) (122/55 - 153/79)  BP(mean): 71 (15 Dec 2022 14:00) (66 - 104)  RR: 17 (15 Dec 2022 14:38) (15 - 20)  SpO2: 97% (15 Dec 2022 14:38) (93% - 98%)    Parameters below as of 15 Dec 2022 14:38  Patient On (Oxygen Delivery Method): room air        I&Os Summary  Al: 385 yellow  NEYMAR: 50    15 Dec 2022 07:01  -  15 Dec 2022 16:40  --------------------------------------------------------  IN: 600 mL / OUT: 435 mL / NET: 165 mL        Physical Exam  Gen: NAD  Pulm: No respiratory distress, clear to auscultation  CV: Reg  Abd: Dressings c/d/i, soft, NT, ND  : Melendez secured  Venodynes: In place                  Plan:   IVF: LR @ 125  Diet: CLD  Labs: In AM  Abx: Ancef  Repeat tetracaine, lacrilube once tobramycin completed  f/u medicine  f/u PT  Strict I&Os  Analgesia and antiemetics as needed  DVT prophylaxis/OOB/Incentive spirometry

## 2022-12-15 NOTE — PATIENT PROFILE ADULT - FALL HARM RISK - HARM RISK INTERVENTIONS
Assistance with ambulation/Assistance OOB with selected safe patient handling equipment/Communicate Risk of Fall with Harm to all staff/Discuss with provider need for PT consult/Monitor gait and stability/Provide patient with walking aids - walker, cane, crutches/Reinforce activity limits and safety measures with patient and family/Sit up slowly, dangle for a short time, stand at bedside before walking/Tailored Fall Risk Interventions/Use of alarms - bed, chair and/or voice tab/Visual Cue: Yellow wristband and red socks/Bed in lowest position, wheels locked, appropriate side rails in place/Call bell, personal items and telephone in reach/Instruct patient to call for assistance before getting out of bed or chair/Non-slip footwear when patient is out of bed/[place] to call system/Physically safe environment - no spills, clutter or unnecessary equipment/Purposeful Proactive Rounding/Room/bathroom lighting operational, light cord in reach

## 2022-12-15 NOTE — CONSULT NOTE ADULT - ASSESSMENT
66 y.o. F w/ a hx of obesity, hypothyroidism, HLD who is now s/p R pyeloplasty and stent placement.     # UPJ obstruction   - S/p R pyeloplasty and bilateral stent placement   - Pain control: Tylenol, Dilaudid, Toradol   - DVT ppx: Heparin   - IVF  - CLD   - OOB as able    - Bowel regimen  - Zofran     # HLD: Cont simvastatin     # Hypothyroidism: Synthroid

## 2022-12-15 NOTE — CONSULT NOTE ADULT - SUBJECTIVE AND OBJECTIVE BOX
HPI: 66 y.o. F w/ a hx of obesity, hypothyroidism, HLD who is now s/p RA pyeloplasty and stent placement. Patient was found to have a UPJ obstruction during prior hospitalization. Post-op, patient reports pain and nausea, received Zofran and Dilaudid. Patient denies any history of asthma, does not use any inhalers.   Patient felt she had something in her eye, was thought to have corneal abrasion and started on eye drops.     PAST MEDICAL & SURGICAL HISTORY:  Hypothyroid  Insomnia  Dyslipidemia  UPJ (ureteropelvic junction) obstruction  Arthritis  Other obstructive defects of renal pelvis and ureter  S/P  section  S/P lumbar laminectomy  Cataract  b/l surgery  H/O total knee replacement  b/l    ROS:    Constitutional: [ ] fevers [ ] chills [ ] weight loss [ ] weight gain  HEENT: [ ] dry eyes [ x] eye irritation [ ] postnasal drip [ ] nasal congestion  CV: [ ] chest pain [ ] orthopnea [ ] palpitations [ ] murmur  Resp: [ ] cough [ ] shortness of breath [ ] dyspnea [ ] wheezing [ ] sputum [ ] hemoptysis  GI: [x ] nausea [ ] vomiting [ ] diarrhea [ ] constipation [x ] abd pain [ ] dysphagia   : [ ] dysuria [ ] nocturia [ ] hematuria [ ] increased urinary frequency  Musculoskeletal: [ ] back pain [ ] myalgias [ ] arthralgias [ ] fracture  Skin: [ ] rash [ ] itch  Neurological: [ ] headache [ ] dizziness [ ] syncope [ ] weakness [ ] numbness  Psychiatric: [ ] anxiety [ ] depression  Endocrine: [ ] diabetes [ ] thyroid problem  Hematologic/Lymphatic: [ ] anemia [ ] bleeding problem  Allergic/Immunologic: [ ] itchy eyes [ ] nasal discharge [ ] hives [ ] angioedema  [x ] All other systems negative  [ ] Unable to assess ROS because ________    Allergies    No Known Allergies    Intolerances        Social History: No tobacco use, drug use or alcohol use     FAMILY HISTORY:  No pertinent family history in first degree relatives        MEDICATIONS  (STANDING):  acetaminophen     Tablet .. 975 milliGRAM(s) Oral every 6 hours  cholecalciferol 1000 Unit(s) Oral daily  heparin   Injectable 5000 Unit(s) SubCutaneous every 8 hours  ketorolac   Injectable 15 milliGRAM(s) IV Push every 6 hours  lactated ringers. 1000 milliLiter(s) (125 mL/Hr) IV Continuous <Continuous>  simvastatin 20 milliGRAM(s) Oral at bedtime  tamsulosin 0.4 milliGRAM(s) Oral at bedtime  tobramycin 0.3% Ophthalmic Solution 1 Drop(s) Left EYE every 4 hours    MEDICATIONS  (PRN):  HYDROmorphone  Injectable 0.5 milliGRAM(s) IV Push every 10 minutes PRN Moderate Pain (4 - 6)  ondansetron Injectable 4 milliGRAM(s) IV Push every 6 hours PRN Nausea and/or Vomiting  senna 2 Tablet(s) Oral at bedtime PRN Constipation      Vital Signs Last 24 Hrs  T(C): 35.9 (15 Dec 2022 13:00), Max: 36.8 (15 Dec 2022 06:30)  T(F): 96.7 (15 Dec 2022 13:00), Max: 98.2 (15 Dec 2022 06:30)  HR: 81 (15 Dec 2022 13:00) (72 - 83)  BP: 136/62 (15 Dec 2022 13:00) (133/58 - 153/79)  BP(mean): 79 (15 Dec 2022 13:00) (74 - 104)  RR: 16 (15 Dec 2022 13:00) (15 - 20)  SpO2: 98% (15 Dec 2022 13:00) (93% - 98%)    Parameters below as of 15 Dec 2022 11:15  Patient On (Oxygen Delivery Method): room air      CAPILLARY BLOOD GLUCOSE            PHYSICAL EXAM:  GENERAL: NAD, well-developed female   HEAD:  Atraumatic, Normocephalic  EYES: EOMI, conjunctiva and sclera clear  NECK: Supple, No JVD  CHEST/LUNG: Clear to auscultation bilaterally; No wheeze  HEART: Regular rate and rhythm; No murmurs, rubs, or gallops  ABDOMEN: Obese abdomen, L sided incision sites C/D/I, appropriately tender, + NEYMAR drain. + Melendez draining clear yellow urine  EXTREMITIES:  2+ Peripheral Pulses, No clubbing, cyanosis, or edema  NEUROLOGY: non-focal  SKIN: No rashes or lesions    LABS:    Pre-op labs reviewed

## 2022-12-15 NOTE — BRIEF OPERATIVE NOTE - NSICDXBRIEFPROCEDURE_GEN_ALL_CORE_FT
PROCEDURES:  Pyloroplasty, robot-assisted, laparoscopic, using da Estevan Xi 15-Dec-2022 11:32:45 LEFT robotic assisted Pyloroplsty Chandana Bain  Cystoscopy, with ureteral stent insertion 15-Dec-2022 11:34:34 Cystoscopy with Bilateral Ureter stant placment with fleuroscopy gidancChandana Sarabia

## 2022-12-16 ENCOUNTER — TRANSCRIPTION ENCOUNTER (OUTPATIENT)
Age: 67
End: 2022-12-16

## 2022-12-16 VITALS
DIASTOLIC BLOOD PRESSURE: 65 MMHG | RESPIRATION RATE: 17 BRPM | HEART RATE: 84 BPM | SYSTOLIC BLOOD PRESSURE: 123 MMHG | TEMPERATURE: 98 F | OXYGEN SATURATION: 98 %

## 2022-12-16 LAB
ANION GAP SERPL CALC-SCNC: 9 MMOL/L — SIGNIFICANT CHANGE UP (ref 7–14)
BASOPHILS # BLD AUTO: 0.03 K/UL — SIGNIFICANT CHANGE UP (ref 0–0.2)
BASOPHILS NFR BLD AUTO: 0.3 % — SIGNIFICANT CHANGE UP (ref 0–2)
BUN SERPL-MCNC: 27 MG/DL — HIGH (ref 7–23)
CALCIUM SERPL-MCNC: 8.7 MG/DL — SIGNIFICANT CHANGE UP (ref 8.4–10.5)
CHLORIDE SERPL-SCNC: 102 MMOL/L — SIGNIFICANT CHANGE UP (ref 98–107)
CO2 SERPL-SCNC: 24 MMOL/L — SIGNIFICANT CHANGE UP (ref 22–31)
CREAT SERPL-MCNC: 1.18 MG/DL — SIGNIFICANT CHANGE UP (ref 0.5–1.3)
EGFR: 50 ML/MIN/1.73M2 — LOW
EOSINOPHIL # BLD AUTO: 0 K/UL — SIGNIFICANT CHANGE UP (ref 0–0.5)
EOSINOPHIL NFR BLD AUTO: 0 % — SIGNIFICANT CHANGE UP (ref 0–6)
GLUCOSE SERPL-MCNC: 103 MG/DL — HIGH (ref 70–99)
HCT VFR BLD CALC: 32.9 % — LOW (ref 34.5–45)
HGB BLD-MCNC: 10.3 G/DL — LOW (ref 11.5–15.5)
IANC: 7.64 K/UL — HIGH (ref 1.8–7.4)
IMM GRANULOCYTES NFR BLD AUTO: 0.4 % — SIGNIFICANT CHANGE UP (ref 0–0.9)
LYMPHOCYTES # BLD AUTO: 1.64 K/UL — SIGNIFICANT CHANGE UP (ref 1–3.3)
LYMPHOCYTES # BLD AUTO: 16 % — SIGNIFICANT CHANGE UP (ref 13–44)
MCHC RBC-ENTMCNC: 28.9 PG — SIGNIFICANT CHANGE UP (ref 27–34)
MCHC RBC-ENTMCNC: 31.3 GM/DL — LOW (ref 32–36)
MCV RBC AUTO: 92.2 FL — SIGNIFICANT CHANGE UP (ref 80–100)
MONOCYTES # BLD AUTO: 0.89 K/UL — SIGNIFICANT CHANGE UP (ref 0–0.9)
MONOCYTES NFR BLD AUTO: 8.7 % — SIGNIFICANT CHANGE UP (ref 2–14)
NEUTROPHILS # BLD AUTO: 7.64 K/UL — HIGH (ref 1.8–7.4)
NEUTROPHILS NFR BLD AUTO: 74.6 % — SIGNIFICANT CHANGE UP (ref 43–77)
NRBC # BLD: 0 /100 WBCS — SIGNIFICANT CHANGE UP (ref 0–0)
NRBC # FLD: 0 K/UL — SIGNIFICANT CHANGE UP (ref 0–0)
PLATELET # BLD AUTO: 160 K/UL — SIGNIFICANT CHANGE UP (ref 150–400)
POTASSIUM SERPL-MCNC: 4.4 MMOL/L — SIGNIFICANT CHANGE UP (ref 3.5–5.3)
POTASSIUM SERPL-SCNC: 4.4 MMOL/L — SIGNIFICANT CHANGE UP (ref 3.5–5.3)
RBC # BLD: 3.57 M/UL — LOW (ref 3.8–5.2)
RBC # FLD: 13.4 % — SIGNIFICANT CHANGE UP (ref 10.3–14.5)
SODIUM SERPL-SCNC: 135 MMOL/L — SIGNIFICANT CHANGE UP (ref 135–145)
WBC # BLD: 10.24 K/UL — SIGNIFICANT CHANGE UP (ref 3.8–10.5)
WBC # FLD AUTO: 10.24 K/UL — SIGNIFICANT CHANGE UP (ref 3.8–10.5)

## 2022-12-16 PROCEDURE — 99232 SBSQ HOSP IP/OBS MODERATE 35: CPT

## 2022-12-16 RX ORDER — CEPHALEXIN 500 MG
1 CAPSULE ORAL
Qty: 6 | Refills: 0
Start: 2022-12-16 | End: 2022-12-18

## 2022-12-16 RX ORDER — SODIUM CHLORIDE 9 MG/ML
1000 INJECTION, SOLUTION INTRAVENOUS
Refills: 0 | Status: DISCONTINUED | OUTPATIENT
Start: 2022-12-16 | End: 2022-12-16

## 2022-12-16 RX ORDER — ACETAMINOPHEN 500 MG
3 TABLET ORAL
Qty: 0 | Refills: 0 | DISCHARGE
Start: 2022-12-16

## 2022-12-16 RX ORDER — TAMSULOSIN HYDROCHLORIDE 0.4 MG/1
1 CAPSULE ORAL
Qty: 30 | Refills: 0
Start: 2022-12-16

## 2022-12-16 RX ORDER — IBUPROFEN 200 MG
1 TABLET ORAL
Qty: 0 | Refills: 0 | DISCHARGE

## 2022-12-16 RX ADMIN — Medication 15 MILLIGRAM(S): at 05:52

## 2022-12-16 RX ADMIN — Medication 10 MILLIGRAM(S): at 05:52

## 2022-12-16 RX ADMIN — SODIUM CHLORIDE 75 MILLILITER(S): 9 INJECTION, SOLUTION INTRAVENOUS at 10:09

## 2022-12-16 RX ADMIN — HEPARIN SODIUM 5000 UNIT(S): 5000 INJECTION INTRAVENOUS; SUBCUTANEOUS at 05:52

## 2022-12-16 RX ADMIN — Medication 15 MILLIGRAM(S): at 00:11

## 2022-12-16 RX ADMIN — Medication 100 MILLIGRAM(S): at 05:52

## 2022-12-16 RX ADMIN — Medication 975 MILLIGRAM(S): at 10:07

## 2022-12-16 NOTE — DISCHARGE NOTE NURSING/CASE MANAGEMENT/SOCIAL WORK - NSTOBACCONEVERSMOKERY/N_GEN_A
HPI: as per patient provided history    Exam: N/A (electronic visit)    ASSESSMENT/PLAN:  1. Moderate episode of recurrent major depressive disorder [facility])  Mother just passed 10/16/2020. Holidays are coming up. Patient has been on Wellbutrin in the past for depression and anxiety and would like to restart this regimen. Will offer behavioral health and she will follow-up in 4 to 6 weeks for titration.  - buPROPion (WELLBUTRIN XL) 150 MG extended release tablet; Take 1 tablet by mouth every morning  Dispense: 90 tablet; Refill: 3    2. FAITH (generalized anxiety disorder)  As above  - buPROPion (WELLBUTRIN XL) 150 MG extended release tablet; Take 1 tablet by mouth every morning  Dispense: 90 tablet; Refill: 3    Patient instructed to call the office if worsens, or fails to improve as anticipated. 5-10 minutes were spent on the digital evaluation and management of this patient.     Stacy Raymond MD  10/30/2020  10:05 AM No

## 2022-12-16 NOTE — DISCHARGE NOTE PROVIDER - HOSPITAL COURSE
66 yo F underwent uncomplicated left pyeloplasty, bilateral ureteral stent placement on 12/15/2022.  Postoperative course uneventful, successful TOV on POD #1,  pain controlled, ambulating.  Return of GI function on POD # , diet advanced without incident.   Pt d/c on POD #1 to f/u with Dr. Josue.  I-stop checked.

## 2022-12-16 NOTE — DISCHARGE NOTE NURSING/CASE MANAGEMENT/SOCIAL WORK - NSDCVIVACCINE_GEN_ALL_CORE_FT
Tdap; 14-Dec-2020 10:14; Kaylin Dumont (RN); [employer]; [id number] (Exp. Date: 21-Jul-2022); IntraMuscular; Deltoid Left.; 0.5 milliLiter(s); VIS (VIS Published: 09-May-2013, VIS Presented: 14-Dec-2020);

## 2022-12-16 NOTE — PHYSICAL THERAPY INITIAL EVALUATION ADULT - ADDITIONAL COMMENTS
Pt reports that she lives alone in a private house with 2 steps to enter with +right handrail and a flight of stairs to negotiate inside with +left handrail. Prior to admission, pt was completely independent and used a single axis cane for ambulation of long distances. Pt reports no recent falls.    Pt left comfortably in bed with all lines intact, NAD, call bell in reach, +bed alarm. RN made aware.

## 2022-12-16 NOTE — DISCHARGE NOTE NURSING/CASE MANAGEMENT/SOCIAL WORK - PATIENT PORTAL LINK FT
You can access the FollowMyHealth Patient Portal offered by [facility] by registering at the following website: [email or web address]. By joining [employer]’s FollowMyHealth portal, you will also be able to view your health information using other applications (apps) compatible with our system.

## 2022-12-16 NOTE — DISCHARGE NOTE PROVIDER - NSDCMRMEDTOKEN_GEN_ALL_CORE_FT
acetaminophen 325 mg oral tablet: 3 tab(s) orally every 6 hours as needed for pain, alternate with ibuprofen  cephalexin 500 mg oral tablet: 1 tab(s) orally 2 times a day   ibuprofen 200 mg oral tablet: 2 tab(s) orally every 6 hours as needed  for pain, alternate with acetaminophen  simvastatin 20 mg oral tablet: 1 tab(s) orally once a day (at bedtime)  Synthroid 175 mcg (0.175 mg) oral tablet: 1 tab(s) orally once a day am  tamsulosin 0.4 mg oral capsule: 1 cap(s) orally once a day (at bedtime)  Vitamin D3: orally once a day

## 2022-12-16 NOTE — PHYSICAL THERAPY INITIAL EVALUATION ADULT - PATIENT PROFILE REVIEW, REHAB EVAL
ACTIVITY ORDER: Ambulate as tolerated; Spoke to AARON Pinto prior --> Pt OK for PT evaluation and OOB activity./yes ACTIVITY ORDER: Ambulate as tolerated; Spoke to AARON Landon prior --> Pt OK for PT evaluation and OOB activity./yes

## 2022-12-16 NOTE — PHYSICAL THERAPY INITIAL EVALUATION ADULT - NSPTDISCHREC_GEN_A_CORE
No skilled PT needs Pt appears to be at her baseline level and is independent therefore will be discharged from PT program./No skilled PT needs

## 2022-12-16 NOTE — DISCHARGE NOTE NURSING/CASE MANAGEMENT/SOCIAL WORK - NSDCPEFALRISK_GEN_ALL_CORE
For information on Fall & Injury Prevention, visit: [email or web address] OR  [email or web address] OR  https://www.cdc.gov/steadi/patient.html

## 2022-12-16 NOTE — DISCHARGE NOTE PROVIDER - PROVIDER TOKENS
FREE:[LAST:[Joselo],FIRST:[Simon],PHONE:[phone]],FAX:[(   )    -],ADDRESS:[[address] [place] [address]]]

## 2022-12-16 NOTE — DISCHARGE NOTE NURSING/CASE MANAGEMENT/SOCIAL WORK - NSDCPNINST_GEN_ALL_CORE
Make a follow up appointment with Dr. Josue. Call MD if you develop a fever, or if there is redness, swelling, drainage or pain not relieved by pain medication. No heavy lifting, bending, or straining to move your bowels. Take over the counter stool softeners as needed to prevent constipation which may be caused by pain medication. Drink plenty of liquids.

## 2022-12-16 NOTE — DISCHARGE NOTE PROVIDER - CARE PROVIDER_API CALL
Simon Josue  [address] [facility]  [address] [place] [address]  Phone: [phone]  Fax: (   )    -  Follow Up Time:

## 2022-12-16 NOTE — PROGRESS NOTE ADULT - SUBJECTIVE AND OBJECTIVE BOX
Overnight events:  None    Subjective:  Pt offers no complaints, states eye pain has resolved, tolerating CLD, no N/V, + flatus, not OOB yet    Objective:    Vital signs  T(C): , Max: 37 (12-15-22 @ 22:05)  HR: 81 (12-16-22 @ 06:00)  BP: 134/75 (12-16-22 @ 06:00)  SpO2: 100% (12-16-22 @ 06:00)  Wt(kg): --    Output   Sasha: 650 yellow  NEYMAR: 57  12-15 @ 07:01  -  12-16 @ 07:00  --------------------------------------------------------  IN: 600 mL / OUT: 1284 mL / NET: -684 mL        Gen: NAD  Abd: steris c/d/i, soft, nontender, nondistended, NEYMAR dressing c/d/i  : sasha secured    Labs                        10.3   10.24 )-----------( 160      ( 16 Dec 2022 06:36 )             32.9     16 Dec 2022 06:36    135    |  102    |  27     ----------------------------<  103    4.4     |  24     |  1.18     Ca    8.7        16 Dec 2022 06:36      
Merlin Mathew, MD   Hospitalist  Pager #[phone]    PROGRESS NOTE:     Patient is a 67y old  Female who presents with a chief complaint of R cysto/stent (15 Dec 2022 13:06)      SUBJECTIVE / OVERNIGHT EVENTS: NEON   Patient feels well, passed gas, tolerating diet, pain is controlled. Denies any CP, SOB    ADDITIONAL REVIEW OF SYSTEMS:    MEDICATIONS  (STANDING):  acetaminophen     Tablet .. 975 milliGRAM(s) Oral every 6 hours  ceFAZolin   IVPB 1000 milliGRAM(s) IV Intermittent every 8 hours  cholecalciferol 1000 Unit(s) Oral daily  dextrose 5% + sodium chloride 0.45%. 1000 milliLiter(s) (75 mL/Hr) IV Continuous <Continuous>  heparin   Injectable 5000 Unit(s) SubCutaneous every 8 hours  ketorolac   Injectable 15 milliGRAM(s) IV Push every 6 hours  levothyroxine 175 MICROGram(s) Oral daily  simvastatin 20 milliGRAM(s) Oral at bedtime  tamsulosin 0.4 milliGRAM(s) Oral at bedtime    MEDICATIONS  (PRN):  ondansetron Injectable 4 milliGRAM(s) IV Push every 6 hours PRN Nausea and/or Vomiting  senna 2 Tablet(s) Oral at bedtime PRN Constipation      CAPILLARY BLOOD GLUCOSE        I&O's Summary    15 Dec 2022 07:01  -  16 Dec 2022 07:00  --------------------------------------------------------  IN: 600 mL / OUT: 1284 mL / NET: -684 mL    16 Dec 2022 07:01  -  16 Dec 2022 11:12  --------------------------------------------------------  IN: 0 mL / OUT: 100 mL / NET: -100 mL        PHYSICAL EXAM:  Vital Signs Last 24 Hrs  T(C): 36.9 (16 Dec 2022 09:15), Max: 37 (15 Dec 2022 22:05)  T(F): 98.5 (16 Dec 2022 09:15), Max: 98.6 (15 Dec 2022 22:05)  HR: 84 (16 Dec 2022 09:15) (71 - 92)  BP: 123/65 (16 Dec 2022 09:15) (115/60 - 149/72)  BP(mean): 71 (15 Dec 2022 14:00) (66 - 95)  RR: 17 (16 Dec 2022 09:15) (15 - 20)  SpO2: 98% (16 Dec 2022 09:15) (93% - 100%)    Parameters below as of 16 Dec 2022 09:15  Patient On (Oxygen Delivery Method): room air        CONSTITUTIONAL: NAD, well-developed obese female   RESPIRATORY: Normal respiratory effort; lungs are clear to auscultation bilaterally  CARDIOVASCULAR: Regular rate and rhythm, normal S1 and S2, no murmur/rub/gallop; No lower extremity edema; Peripheral pulses are 2+ bilaterally  ABDOMEN: Obese abdomen, L sided incision sites C/D/I, appropriately tender, + NEYMAR drain. + Melendez draining clear yellow urine  MUSCULOSKELETAL no clubbing or cyanosis of digits; no joint swelling or tenderness to palpation  PSYCH: A+O to person, place, and time; affect appropriate    LABS:                        10.3   10.24 )-----------( 160      ( 16 Dec 2022 06:36 )             32.9     12-16    135  |  102  |  27<H>  ----------------------------<  103<H>  4.4   |  24  |  1.18    Ca    8.7      16 Dec 2022 06:36                  RADIOLOGY & ADDITIONAL TESTS:  Results Reviewed:   Imaging Personally Reviewed:  Electrocardiogram Personally Reviewed:    COORDINATION OF CARE:  Care Discussed with Consultants/Other Providers [Y/N]:  Prior or Outpatient Records Reviewed [Y/N]:

## 2022-12-16 NOTE — DISCHARGE NOTE NURSING/CASE MANAGEMENT/SOCIAL WORK - NSDPDISTO_GEN_ALL_CORE
Pt. is afebrile and offers no complaints. In no acute distress. Abdominal scope sites: clean, dry and intact. Pt is ambulating , tolerating diet well, and voiding in adequate amounts./Home

## 2022-12-16 NOTE — DISCHARGE NOTE PROVIDER - NSDCCPCAREPLAN_GEN_ALL_CORE_FT
PRINCIPAL DISCHARGE DIAGNOSIS  Diagnosis: Ureteropelvic junction (UPJ) obstruction  Assessment and Plan of Treatment: Keep well hydrated.  No heavy lifting (greater than 10 pounds) or straining for 4 to 6 weeks.  You may shower. Change dressing at drain site daily or as needed until dry. Do not drive when taking pain medication.  You may have intermittent blood tinged urine and slight flank pain when you urinate.  This is normal and due to the stent in your ureter.   If your urine becomes bright red or with clots, please call the office.  Dr. Josue's office will call you  to schedule a follow up appointment and stent removal/exchange.  Call the office if you have fever greater than 101, difficulty urinating, your urine becomes bloody, pain not relieved with pain medication, nausea/vomiting.        SECONDARY DISCHARGE DIAGNOSES  Diagnosis: HLD (hyperlipidemia)  Assessment and Plan of Treatment: Continue current home medications and follow up with your primary care provider

## 2022-12-16 NOTE — PROGRESS NOTE ADULT - ASSESSMENT
65 yo F s/p b/l stent placement, jessica left pyeloplasty 12/15/2022, post op left corneal abrasion    12/16: eye pain resolved, no abdominal pain, tolerating CLD, no N/V, + flatus, not OOB yet    Plan:  -IVM  -f/u AM labs  -d/c baez, monitor UO  -advance diet  -f/u medicine  -f/u PT  -DVT prophy, IS, OOB, ambulate  -d/c home later today     65 yo F s/p b/l stent placement, jessica left pyeloplasty 12/15/2022, post op left corneal abrasion    12/16: eye pain resolved, no abdominal pain, tolerating CLD, no N/V, + flatus, not OOB yet    Plan:  -IVM  -f/u AM labs  -d/c baez, monitor UO  -d/c NEYMAR  -advance diet  -f/u medicine  -f/u PT  -DVT prophy, IS, OOB, ambulate  -d/c home later today

## 2022-12-16 NOTE — PROGRESS NOTE ADULT - ASSESSMENT
67 y.o. F w/ a hx of obesity, hypothyroidism, HLD who is now s/p R pyeloplasty and stent placement, POD#1.     # UPJ obstruction   - S/p R pyeloplasty and bilateral stent placement   - Pain control: Tylenol, Dilaudid, Toradol   - DVT ppx: Heparin   - IVF  - CLD   - OOB as able    - Bowel regimen  - Zofran     # HLD: Cont simvastatin     # Hypothyroidism: Synthroid

## 2022-12-22 LAB — SURGICAL PATHOLOGY STUDY: SIGNIFICANT CHANGE UP

## 2023-01-17 ENCOUNTER — APPOINTMENT (OUTPATIENT)
Dept: UROLOGY | Facility: CLINIC | Age: 68
End: 2023-01-17
Payer: MEDICARE

## 2023-01-17 ENCOUNTER — APPOINTMENT (OUTPATIENT)
Dept: UROLOGY | Facility: CLINIC | Age: 68
End: 2023-01-17

## 2023-01-17 VITALS
OXYGEN SATURATION: 97 % | SYSTOLIC BLOOD PRESSURE: 171 MMHG | TEMPERATURE: 97.6 F | HEART RATE: 70 BPM | DIASTOLIC BLOOD PRESSURE: 89 MMHG

## 2023-01-17 PROCEDURE — 52310 CYSTOSCOPY AND TREATMENT: CPT | Mod: 58

## 2023-01-17 RX ORDER — SULFAMETHOXAZOLE AND TRIMETHOPRIM 800; 160 MG/1; MG/1
800-160 TABLET ORAL TWICE DAILY
Qty: 2 | Refills: 0 | Status: COMPLETED | COMMUNITY
Start: 2023-01-17 | End: 2023-01-17

## 2023-01-17 RX ORDER — SULFAMETHOXAZOLE AND TRIMETHOPRIM 800; 160 MG/1; MG/1
800-160 TABLET ORAL
Refills: 0 | Status: COMPLETED | OUTPATIENT
Start: 2023-01-17

## 2023-01-17 RX ADMIN — SULFAMETHOXAZOLE AND TRIMETHOPRIM 0 MG: 800; 160 TABLET ORAL at 00:00

## 2023-01-20 ENCOUNTER — NON-APPOINTMENT (OUTPATIENT)
Age: 68
End: 2023-01-20

## 2023-01-20 ENCOUNTER — APPOINTMENT (OUTPATIENT)
Dept: UROLOGY | Facility: CLINIC | Age: 68
End: 2023-01-20
Payer: MEDICARE

## 2023-01-20 VITALS
HEART RATE: 76 BPM | DIASTOLIC BLOOD PRESSURE: 81 MMHG | TEMPERATURE: 97.8 F | SYSTOLIC BLOOD PRESSURE: 145 MMHG | OXYGEN SATURATION: 97 %

## 2023-01-20 PROCEDURE — 99213 OFFICE O/P EST LOW 20 MIN: CPT | Mod: 25

## 2023-01-20 PROCEDURE — 76872 US TRANSRECTAL: CPT

## 2023-01-20 NOTE — HISTORY OF PRESENT ILLNESS
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
3 seconds or more

## 2023-01-20 NOTE — ASSESSMENT
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 there is a 5 mm echogenic focus in the lower pole of the right kidney with distal shadowing. Both kidneys are normal in size and echogenicity without solid masses visualized\par \par Drawing blood for creatinine check.\par In case or persistent pain, the patient is advised to refer to ER for stent placement.

## 2023-01-21 ENCOUNTER — TRANSCRIPTION ENCOUNTER (OUTPATIENT)
Age: 68
End: 2023-01-21

## 2023-01-21 ENCOUNTER — EMERGENCY (EMERGENCY)
Facility: HOSPITAL | Age: 68
LOS: 0 days | Discharge: HOME | End: 2023-01-22
Attending: STUDENT IN AN ORGANIZED HEALTH CARE EDUCATION/TRAINING PROGRAM | Admitting: UROLOGY
Payer: MEDICARE

## 2023-01-21 ENCOUNTER — OUTPATIENT (OUTPATIENT)
Dept: OUTPATIENT SERVICES | Facility: HOSPITAL | Age: 68
LOS: 1 days | Discharge: HOME | End: 2023-01-21

## 2023-01-21 VITALS
HEIGHT: 57.5 IN | RESPIRATION RATE: 20 BRPM | HEART RATE: 72 BPM | TEMPERATURE: 98 F | SYSTOLIC BLOOD PRESSURE: 125 MMHG | OXYGEN SATURATION: 95 % | WEIGHT: 212.97 LBS | DIASTOLIC BLOOD PRESSURE: 82 MMHG

## 2023-01-21 DIAGNOSIS — Z98.89 OTHER SPECIFIED POSTPROCEDURAL STATES: Chronic | ICD-10-CM

## 2023-01-21 DIAGNOSIS — H26.9 UNSPECIFIED CATARACT: Chronic | ICD-10-CM

## 2023-01-21 DIAGNOSIS — Z96.659 PRESENCE OF UNSPECIFIED ARTIFICIAL KNEE JOINT: Chronic | ICD-10-CM

## 2023-01-21 LAB
ALBUMIN SERPL ELPH-MCNC: 4.1 G/DL
ANION GAP SERPL CALC-SCNC: 12 MMOL/L
BUN SERPL-MCNC: 28 MG/DL
CALCIUM SERPL-MCNC: 9.6 MG/DL
CHLORIDE SERPL-SCNC: 105 MMOL/L
CO2 SERPL-SCNC: 23 MMOL/L
CREAT SERPL-MCNC: 1.61 MG/DL
EGFR: 34 ML/MIN/1.73M2
GLUCOSE SERPL-MCNC: 112 MG/DL
PHOSPHATE SERPL-MCNC: 3.9 MG/DL
POTASSIUM SERPL-SCNC: 4.8 MMOL/L
SODIUM SERPL-SCNC: 140 MMOL/L

## 2023-01-21 PROCEDURE — 99285 EMERGENCY DEPT VISIT HI MDM: CPT

## 2023-01-21 NOTE — ED ADULT TRIAGE NOTE - CHIEF COMPLAINT QUOTE
Pt schedule for surgery tomorrow morning (  with MD Simon Barba) ( for L kidney stent). h/o Hypothyroid, hld

## 2023-01-22 ENCOUNTER — TRANSCRIPTION ENCOUNTER (OUTPATIENT)
Age: 68
End: 2023-01-22

## 2023-01-22 VITALS
HEART RATE: 71 BPM | SYSTOLIC BLOOD PRESSURE: 123 MMHG | DIASTOLIC BLOOD PRESSURE: 68 MMHG | OXYGEN SATURATION: 92 % | TEMPERATURE: 99 F | RESPIRATION RATE: 18 BRPM

## 2023-01-22 LAB
ALBUMIN SERPL ELPH-MCNC: 3 G/DL — LOW (ref 3.3–5)
ALP SERPL-CCNC: 104 U/L — SIGNIFICANT CHANGE UP (ref 40–120)
ALT FLD-CCNC: 15 U/L — SIGNIFICANT CHANGE UP (ref 12–78)
ANION GAP SERPL CALC-SCNC: 7 MMOL/L — SIGNIFICANT CHANGE UP (ref 5–17)
APTT BLD: 28.2 SEC — SIGNIFICANT CHANGE UP (ref 27.5–35.5)
AST SERPL-CCNC: 15 U/L — SIGNIFICANT CHANGE UP (ref 15–37)
BASOPHILS # BLD AUTO: 0.04 K/UL — SIGNIFICANT CHANGE UP (ref 0–0.2)
BASOPHILS NFR BLD AUTO: 0.4 % — SIGNIFICANT CHANGE UP (ref 0–2)
BILIRUB SERPL-MCNC: 0.2 MG/DL — SIGNIFICANT CHANGE UP (ref 0.2–1.2)
BLD GP AB SCN SERPL QL: SIGNIFICANT CHANGE UP
BUN SERPL-MCNC: 31 MG/DL — HIGH (ref 7–23)
CALCIUM SERPL-MCNC: 9.1 MG/DL — SIGNIFICANT CHANGE UP (ref 8.5–10.1)
CHLORIDE SERPL-SCNC: 108 MMOL/L — SIGNIFICANT CHANGE UP (ref 96–108)
CO2 SERPL-SCNC: 25 MMOL/L — SIGNIFICANT CHANGE UP (ref 22–31)
CREAT SERPL-MCNC: 1.34 MG/DL — HIGH (ref 0.5–1.3)
EGFR: 43 ML/MIN/1.73M2 — LOW
EOSINOPHIL # BLD AUTO: 0.31 K/UL — SIGNIFICANT CHANGE UP (ref 0–0.5)
EOSINOPHIL NFR BLD AUTO: 3 % — SIGNIFICANT CHANGE UP (ref 0–6)
FLUAV AG NPH QL: SIGNIFICANT CHANGE UP
FLUBV AG NPH QL: SIGNIFICANT CHANGE UP
GLUCOSE SERPL-MCNC: 122 MG/DL — HIGH (ref 70–99)
HCT VFR BLD CALC: 35.7 % — SIGNIFICANT CHANGE UP (ref 34.5–45)
HGB BLD-MCNC: 11.1 G/DL — LOW (ref 11.5–15.5)
IMM GRANULOCYTES NFR BLD AUTO: 0.2 % — SIGNIFICANT CHANGE UP (ref 0–0.9)
INR BLD: 0.98 RATIO — SIGNIFICANT CHANGE UP (ref 0.88–1.16)
LYMPHOCYTES # BLD AUTO: 2.62 K/UL — SIGNIFICANT CHANGE UP (ref 1–3.3)
LYMPHOCYTES # BLD AUTO: 25.4 % — SIGNIFICANT CHANGE UP (ref 13–44)
MCHC RBC-ENTMCNC: 28.7 PG — SIGNIFICANT CHANGE UP (ref 27–34)
MCHC RBC-ENTMCNC: 31.1 G/DL — LOW (ref 32–36)
MCV RBC AUTO: 92.2 FL — SIGNIFICANT CHANGE UP (ref 80–100)
MONOCYTES # BLD AUTO: 0.95 K/UL — HIGH (ref 0–0.9)
MONOCYTES NFR BLD AUTO: 9.2 % — SIGNIFICANT CHANGE UP (ref 2–14)
NEUTROPHILS # BLD AUTO: 6.37 K/UL — SIGNIFICANT CHANGE UP (ref 1.8–7.4)
NEUTROPHILS NFR BLD AUTO: 61.8 % — SIGNIFICANT CHANGE UP (ref 43–77)
NRBC # BLD: 0 /100 WBCS — SIGNIFICANT CHANGE UP (ref 0–0)
PLATELET # BLD AUTO: 197 K/UL — SIGNIFICANT CHANGE UP (ref 150–400)
POTASSIUM SERPL-MCNC: 4.3 MMOL/L — SIGNIFICANT CHANGE UP (ref 3.5–5.3)
POTASSIUM SERPL-SCNC: 4.3 MMOL/L — SIGNIFICANT CHANGE UP (ref 3.5–5.3)
PROT SERPL-MCNC: 6.7 GM/DL — SIGNIFICANT CHANGE UP (ref 6–8.3)
PROTHROM AB SERPL-ACNC: 11.7 SEC — SIGNIFICANT CHANGE UP (ref 10.5–13.4)
RBC # BLD: 3.87 M/UL — SIGNIFICANT CHANGE UP (ref 3.8–5.2)
RBC # FLD: 13.3 % — SIGNIFICANT CHANGE UP (ref 10.3–14.5)
SARS-COV-2 RNA SPEC QL NAA+PROBE: SIGNIFICANT CHANGE UP
SODIUM SERPL-SCNC: 140 MMOL/L — SIGNIFICANT CHANGE UP (ref 135–145)
WBC # BLD: 10.31 K/UL — SIGNIFICANT CHANGE UP (ref 3.8–10.5)
WBC # FLD AUTO: 10.31 K/UL — SIGNIFICANT CHANGE UP (ref 3.8–10.5)

## 2023-01-22 PROCEDURE — 52332 CYSTOSCOPY AND TREATMENT: CPT | Mod: LT,78

## 2023-01-22 DEVICE — URETERAL CATH OPEN END FLEXI-TIP 5FR .038" X 70CM: Type: IMPLANTABLE DEVICE | Site: LEFT | Status: FUNCTIONAL

## 2023-01-22 DEVICE — URETERAL STENT PERCUFLEX PLUS 7FR 26CM: Type: IMPLANTABLE DEVICE | Site: LEFT | Status: FUNCTIONAL

## 2023-01-22 RX ORDER — ONDANSETRON 8 MG/1
4 TABLET, FILM COATED ORAL EVERY 6 HOURS
Refills: 0 | Status: DISCONTINUED | OUTPATIENT
Start: 2023-01-22 | End: 2023-01-22

## 2023-01-22 RX ORDER — FENTANYL CITRATE 50 UG/ML
25 INJECTION INTRAVENOUS
Refills: 0 | Status: DISCONTINUED | OUTPATIENT
Start: 2023-01-22 | End: 2023-01-22

## 2023-01-22 RX ORDER — LEVOTHYROXINE SODIUM 125 MCG
1 TABLET ORAL
Qty: 0 | Refills: 0 | DISCHARGE

## 2023-01-22 RX ORDER — SODIUM CHLORIDE 9 MG/ML
1000 INJECTION, SOLUTION INTRAVENOUS
Refills: 0 | Status: DISCONTINUED | OUTPATIENT
Start: 2023-01-22 | End: 2023-01-22

## 2023-01-22 RX ORDER — LEVOTHYROXINE SODIUM 125 MCG
175 TABLET ORAL DAILY
Refills: 0 | Status: DISCONTINUED | OUTPATIENT
Start: 2023-01-22 | End: 2023-01-22

## 2023-01-22 RX ORDER — SIMVASTATIN 20 MG/1
20 TABLET, FILM COATED ORAL AT BEDTIME
Refills: 0 | Status: DISCONTINUED | OUTPATIENT
Start: 2023-01-22 | End: 2023-01-22

## 2023-01-22 RX ORDER — TAMSULOSIN HYDROCHLORIDE 0.4 MG/1
0.4 CAPSULE ORAL AT BEDTIME
Refills: 0 | Status: DISCONTINUED | OUTPATIENT
Start: 2023-01-22 | End: 2023-01-22

## 2023-01-22 RX ORDER — IBUPROFEN 200 MG
1 TABLET ORAL
Qty: 0 | Refills: 0 | DISCHARGE

## 2023-01-22 RX ORDER — HYDROMORPHONE HYDROCHLORIDE 2 MG/ML
0.5 INJECTION INTRAMUSCULAR; INTRAVENOUS; SUBCUTANEOUS
Refills: 0 | Status: DISCONTINUED | OUTPATIENT
Start: 2023-01-22 | End: 2023-01-22

## 2023-01-22 RX ORDER — CIPROFLOXACIN LACTATE 400MG/40ML
1 VIAL (ML) INTRAVENOUS
Qty: 4 | Refills: 0
Start: 2023-01-22 | End: 2023-01-23

## 2023-01-22 RX ORDER — SIMVASTATIN 20 MG/1
1 TABLET, FILM COATED ORAL
Qty: 0 | Refills: 0 | DISCHARGE

## 2023-01-22 RX ADMIN — Medication 175 MICROGRAM(S): at 06:43

## 2023-01-22 RX ADMIN — SODIUM CHLORIDE 100 MILLILITER(S): 9 INJECTION, SOLUTION INTRAVENOUS at 07:06

## 2023-01-22 NOTE — DISCHARGE NOTE PROVIDER - NSDCFUSCHEDAPPT_GEN_ALL_CORE_FT
Simon Josue  [facility] Physician [facility]  UROLOGY [address] [facility]  Scheduled Appointment: 02/21/2023

## 2023-01-22 NOTE — ED PROVIDER NOTE - CLINICAL SUMMARY MEDICAL DECISION MAKING FREE TEXT BOX
68 y/o F presenting to the ED for procedure with urology. Vitals stable. She is well appearing in NAD. Per urology, to have preop labs and admission to their service for stent placement in the AM.

## 2023-01-22 NOTE — ED ADULT NURSE NOTE - OBJECTIVE STATEMENT
Received pt in the ED from triage, aox3. C/o revision of L ureteral stent. Patient states she has been having continuous pain and was sent ot the ED for procedure tomorrow with Dr. Josue. Pt endorsed she took Tylenol before arrival and the pain is controlled now. Pt denied fever, cough, chills, n/v/d. Speaks full sentences, unlabored breathing on RA. Able to BACON.

## 2023-01-22 NOTE — H&P ADULT - NSHPPHYSICALEXAM_GEN_ALL_CORE
Constitutional: WDWN resting comfortably in bed; NAD  HEENT: NC/AT, PERRL, EOMI, anicteric sclera, no nasal discharge; uvula midline, no oropharyngeal erythema or exudates  Neck: supple; no JVD or thyromegaly  Respiratory: CTA B/L; no W/R/R, no retractions  Cardiac: +S1/S2; RRR; no M/R/G; PMI non-displaced  Gastrointestinal: soft, Obese, NT/ND; no rebound or guarding; +BS   Extremities: , no clubbing or cyanosis; no peripheral edema  Musculoskeletal:  no joint swelling, tenderness or erythema  Vascular: 2+ radial, femoral, DP/PT pulses B/L  Skin: warm, dry and intact; no rashes, wounds, or scars  Neurologic: AAOx3; CNS grossly intact; no focal deficits

## 2023-01-22 NOTE — DISCHARGE NOTE NURSING/CASE MANAGEMENT/SOCIAL WORK - NSDCPEFALRISK_GEN_ALL_CORE
For information on Fall & Injury Prevention, visit: [email or web address] OR  [email or web address] OR  https://www.cdc.gov/steadi/patient.html

## 2023-01-22 NOTE — ED PROVIDER NOTE - OBJECTIVE STATEMENT
68 y/o F with PMH L pyeloplasty, b/l ureteral stent placement on 12/15/22, presenting to the ED for revision of L ureteral stent. Patient states she has been having continuous pain and was sent ot the ED for procedure tomorrow with Dr. Josue. States her     66 yo F underwent uncomplicated left pyeloplasty, bilateral ureteral stent placement on 12/15/2022.  Postoperative course uneventful, successful TOV on POD #1,  pain controlled, ambulating.  Return of GI function on POD # , diet advanced without incident.   Pt d/c on POD #1 to f/u with Dr. Josue.  I-stop checked. 68 y/o F with PMH L pyeloplasty, b/l ureteral stent placement on 12/15/22, presenting to the ED for revision of L ureteral stent. Patient states she has been having continuous pain and was sent ot the ED for procedure tomorrow with Dr. Josue. States her pain is currently controlled with tylenol. No fever, chills, N/V.

## 2023-01-22 NOTE — DISCHARGE NOTE PROVIDER - NSDCMRMEDTOKEN_GEN_ALL_CORE_FT
acetaminophen 325 mg oral tablet: 3 tab(s) orally every 6 hours as needed for pain, alternate with ibuprofen  Cipro 500 mg oral tablet: 1 tab(s) orally every 12 hours   ibuprofen 200 mg oral tablet: 2 tab(s) orally every 6 hours as needed  for pain, alternate with acetaminophen  simvastatin 20 mg oral tablet: 1 tab(s) orally once a day (at bedtime)  Synthroid 175 mcg (0.175 mg) oral tablet: 1 tab(s) orally once a day am  tamsulosin 0.4 mg oral capsule: 1 cap(s) orally once a day (at bedtime)  Vitamin D3: orally once a day

## 2023-01-22 NOTE — DISCHARGE NOTE NURSING/CASE MANAGEMENT/SOCIAL WORK - PATIENT PORTAL LINK FT
You can access the FollowMyHealth Patient Portal offered by [facility] by registering at the following website: [email or web address]. By joining [facility]’s FollowMyHealth portal, you will also be able to view your health information using other applications (apps) compatible with our system.

## 2023-01-22 NOTE — ED PROVIDER NOTE - NS ED ROS FT
CONST: no fevers, no chills  EYES: no pain, no vision changes  ENT: no sore throat, no ear pain, no change in hearing  CV: no chest pain, no leg swelling  RESP: no shortness of breath, no cough  ABD: no abdominal pain, no nausea, no vomiting, no diarrhea  : no dysuria, + flank pain, no hematuria  MSK: no back pain, no extremity pain  NEURO: no headache or additional neurologic complaints  HEME: no easy bleeding  SKIN:  no rash

## 2023-01-22 NOTE — PATIENT PROFILE ADULT - FALL HARM RISK - HARM RISK INTERVENTIONS
Assistance with ambulation/Assistance OOB with selected safe patient handling equipment/Communicate Risk of Fall with Harm to all staff/Discuss with provider need for PT consult/Monitor gait and stability/Provide patient with walking aids - walker, cane, crutches/Reinforce activity limits and safety measures with patient and family/Sit up slowly, dangle for a short time, stand at bedside before walking/Tailored Fall Risk Interventions/Use of alarms - bed, chair and/or voice tab/Visual Cue: Yellow wristband and red socks/Bed in lowest position, wheels locked, appropriate side rails in place/Call bell, personal items and telephone in reach/Instruct patient to call for assistance before getting out of bed or chair/Non-slip footwear when patient is out of bed/[place] to call system/Physically safe environment - no spills, clutter or unnecessary equipment/Purposeful Proactive Rounding/Room/bathroom lighting operational, light cord in reach

## 2023-01-22 NOTE — DISCHARGE NOTE NURSING/CASE MANAGEMENT/SOCIAL WORK - NSDCVIVACCINE_GEN_ALL_CORE_FT
Tdap; 14-Dec-2020 10:14; Kaylin Dumont (RN); [employer]; [id number] (Exp. Date: 21-Jul-2022); IntraMuscular; Deltoid Left.; 0.5 milliLiter(s); VIS (VIS Published: 09-May-2013, VIS Presented: 14-Dec-2020);

## 2023-01-22 NOTE — DISCHARGE NOTE PROVIDER - NSDCFUADDAPPT_GEN_ALL_CORE_FT
Call to schedule an appointment for follow up with Dr Josue on Tuesday 1/24  You will need creatinine checked at this visit  Take antibiotic as prescribed for 2 days  Stay hydrated.

## 2023-01-22 NOTE — H&P ADULT - ASSESSMENT
68 y/o female with LLQ pain  PMH obesity, HLD, hypothyroidism, cholecystectomy,  presents to ER with c/o LLQ pain. left pyeloplasty, bilateral ureteral stent placement on 12/15/2022 in [facility].     - Preop for Cystoscopy, left ureteroscopy and stent placement with image  - preop labs and COVID screen  - Discussed with Dr Josue 66 y/o female with LLQ pain  PMH obesity, HLD, hypothyroidism, cholecystectomy,  presents to ER with c/o LLQ pain. left pyeloplasty, bilateral ureteral stent placement on 12/15/2022 in [facility].     - Preop for Cystoscopy, left ureteroscopy and stent placement with image  - preop labs and COVID screen  - Discussed with Dr Josue  - discussed with Anesthesia case booked for 8 am

## 2023-01-22 NOTE — DISCHARGE NOTE PROVIDER - HOSPITAL COURSE
HPI:  66 y/o female with PMH obesity, HLD, hypothyroidism, cholecystectomy,  presents to ER with c/o LLQ pain. Patient stated she was sent in by her Urologist Dr Josue for "Left stent placement" . Patient underwent uncomplicated left pyeloplasty, bilateral ureteral stent placement on 12/15/2022 in [facility]. Her left stent was removed in office. She subsequently started having pain. (2023 00:53)  Pt was admitted and brought to OR for left ureteral stent insertion. She was stable same day for d/c with OP Follow up.

## 2023-01-22 NOTE — H&P ADULT - HISTORY OF PRESENT ILLNESS
68 y/o female with PMH obesity, HLD, hypothyroidism, cholecystectomy,  presents to ER with c/o LLQ pain. Patient stated she was sent in by her Urologist Dr Josue for "Left stent placement" . Patient underwent uncomplicated left pyeloplasty, bilateral ureteral stent placement on 12/15/2022 in [facility]. Her left stent was removed in office. She subsequently started having pain.

## 2023-01-22 NOTE — PRE-OP CHECKLIST - SELECT TESTS ORDERED
BMP/CBC/Type and Screen/CXR/COVID-19 BMP/CBC/PT/PTT/INR/Type and Screen/EKG/CXR/COVID-19 BMP/CBC/PT/PTT/INR/Type and Screen/EKG/COVID-19

## 2023-01-24 ENCOUNTER — APPOINTMENT (OUTPATIENT)
Dept: UROLOGY | Facility: CLINIC | Age: 68
End: 2023-01-24
Payer: MEDICARE

## 2023-01-24 VITALS
TEMPERATURE: 97.8 F | OXYGEN SATURATION: 97 % | HEART RATE: 70 BPM | SYSTOLIC BLOOD PRESSURE: 168 MMHG | DIASTOLIC BLOOD PRESSURE: 80 MMHG

## 2023-01-24 PROCEDURE — 99213 OFFICE O/P EST LOW 20 MIN: CPT | Mod: 24

## 2023-01-24 NOTE — HISTORY OF PRESENT ILLNESS
[FreeTextEntry1] : 66 y/o female post felt robot-assisted pyeloplasty.\par After 1 month stent removed and hydronephrosis was detected.\par Creat up to 1.6. Stent placed again, 7Fr, on 01/22. Refers no more pain.\par \par Comes for blood draw to test creatinine.\par

## 2023-01-24 NOTE — ASSESSMENT
[FreeTextEntry1] : 68 y/o female post felt robot-assisted pyeloplasty.\par After 1 month stent removed and hydronephrosis was detected.\par Creat up to 1.6. Stent placed again, 7Fr, on 01/22. Refers no more pain.\par \par Comes for blood draw to test creatinine.\par \par Performing blood draw.\par Will F/U in case of positive results \par \par Stent removal planned in 3 months

## 2023-01-27 LAB
ALBUMIN SERPL ELPH-MCNC: 3.8 G/DL
ANION GAP SERPL CALC-SCNC: 11 MMOL/L
BUN SERPL-MCNC: 30 MG/DL
CALCIUM SERPL-MCNC: 9.4 MG/DL
CHLORIDE SERPL-SCNC: 108 MMOL/L
CO2 SERPL-SCNC: 26 MMOL/L
CREAT SERPL-MCNC: 1.11 MG/DL
EGFR: 54 ML/MIN/1.73M2
GLUCOSE SERPL-MCNC: 117 MG/DL
PHOSPHATE SERPL-MCNC: 3.4 MG/DL
POTASSIUM SERPL-SCNC: 5.5 MMOL/L
SODIUM SERPL-SCNC: 145 MMOL/L

## 2023-02-03 DIAGNOSIS — Z90.49 ACQUIRED ABSENCE OF OTHER SPECIFIED PARTS OF DIGESTIVE TRACT: ICD-10-CM

## 2023-02-03 DIAGNOSIS — Z96.653 PRESENCE OF ARTIFICIAL KNEE JOINT, BILATERAL: ICD-10-CM

## 2023-02-03 DIAGNOSIS — N13.0 HYDRONEPHROSIS WITH URETEROPELVIC JUNCTION OBSTRUCTION: ICD-10-CM

## 2023-02-03 DIAGNOSIS — J45.909 UNSPECIFIED ASTHMA, UNCOMPLICATED: ICD-10-CM

## 2023-02-03 DIAGNOSIS — E66.9 OBESITY, UNSPECIFIED: ICD-10-CM

## 2023-02-03 DIAGNOSIS — E78.00 PURE HYPERCHOLESTEROLEMIA, UNSPECIFIED: ICD-10-CM

## 2023-02-03 DIAGNOSIS — E03.9 HYPOTHYROIDISM, UNSPECIFIED: ICD-10-CM

## 2023-02-03 DIAGNOSIS — N13.5 CROSSING VESSEL AND STRICTURE OF URETER WITHOUT HYDRONEPHROSIS: ICD-10-CM

## 2023-02-03 DIAGNOSIS — M19.90 UNSPECIFIED OSTEOARTHRITIS, UNSPECIFIED SITE: ICD-10-CM

## 2023-02-21 ENCOUNTER — APPOINTMENT (OUTPATIENT)
Dept: UROLOGY | Facility: CLINIC | Age: 68
End: 2023-02-21
Payer: MEDICARE

## 2023-02-21 ENCOUNTER — LABORATORY RESULT (OUTPATIENT)
Age: 68
End: 2023-02-21

## 2023-02-21 VITALS — TEMPERATURE: 97.1 F

## 2023-02-21 PROCEDURE — 99213 OFFICE O/P EST LOW 20 MIN: CPT | Mod: 24

## 2023-02-21 NOTE — ASSESSMENT
[FreeTextEntry1] : 66 y/o female post felt robot-assisted pyeloplasty.\par After 1 month stent removed and hydronephrosis was detected.\par Stent placed again, 7Fr, on 01/22. Refers no more pain\par Creat was back to 1.1.\par \par Comes for new blood draw to test creatinine.\par \par Performing blood draw.\par Will F/U in case of positive results \par \par Stent removal already planned

## 2023-02-21 NOTE — HISTORY OF PRESENT ILLNESS
[FreeTextEntry1] : 68 y/o female post felt robot-assisted pyeloplasty.\par After 1 month stent removed and hydronephrosis was detected.\par Stent placed again, 7Fr, on 01/22. Refers no more pain\par Creat was back to 1.1.\par \par Comes for new blood draw to test creatinine.\par

## 2023-02-24 ENCOUNTER — NON-APPOINTMENT (OUTPATIENT)
Age: 68
End: 2023-02-24

## 2023-04-18 ENCOUNTER — APPOINTMENT (OUTPATIENT)
Dept: UROLOGY | Facility: CLINIC | Age: 68
End: 2023-04-18

## 2023-04-18 VITALS
SYSTOLIC BLOOD PRESSURE: 160 MMHG | OXYGEN SATURATION: 97 % | TEMPERATURE: 97.8 F | HEART RATE: 66 BPM | DIASTOLIC BLOOD PRESSURE: 78 MMHG

## 2023-04-21 LAB
ALBUMIN SERPL ELPH-MCNC: 4.2 G/DL
ANION GAP SERPL CALC-SCNC: 11 MMOL/L
BUN SERPL-MCNC: 18 MG/DL
CALCIUM SERPL-MCNC: 9.7 MG/DL
CHLORIDE SERPL-SCNC: 106 MMOL/L
CO2 SERPL-SCNC: 24 MMOL/L
CREAT SERPL-MCNC: 0.8 MG/DL
EGFR: 80 ML/MIN/1.73M2
GLUCOSE SERPL-MCNC: 100 MG/DL
PHOSPHATE SERPL-MCNC: 3.5 MG/DL
POTASSIUM SERPL-SCNC: 5.2 MMOL/L
SODIUM SERPL-SCNC: 141 MMOL/L

## 2023-04-24 ENCOUNTER — NON-APPOINTMENT (OUTPATIENT)
Age: 68
End: 2023-04-24

## 2023-07-07 ENCOUNTER — APPOINTMENT (OUTPATIENT)
Dept: UROLOGY | Facility: CLINIC | Age: 68
End: 2023-07-07
Payer: MEDICARE

## 2023-07-07 VITALS
SYSTOLIC BLOOD PRESSURE: 144 MMHG | OXYGEN SATURATION: 96 % | HEART RATE: 71 BPM | TEMPERATURE: 98 F | DIASTOLIC BLOOD PRESSURE: 84 MMHG

## 2023-07-07 PROCEDURE — 81003 URINALYSIS AUTO W/O SCOPE: CPT | Mod: QW

## 2023-07-07 PROCEDURE — 52310 CYSTOSCOPY AND TREATMENT: CPT

## 2023-07-07 RX ORDER — SULFAMETHOXAZOLE AND TRIMETHOPRIM 800; 160 MG/1; MG/1
800-160 TABLET ORAL
Refills: 0 | Status: COMPLETED | OUTPATIENT
Start: 2023-07-07

## 2023-07-07 RX ORDER — SULFAMETHOXAZOLE AND TRIMETHOPRIM 800; 160 MG/1; MG/1
800-160 TABLET ORAL TWICE DAILY
Qty: 2 | Refills: 0 | Status: COMPLETED | COMMUNITY
Start: 2023-07-06 | End: 2023-07-07

## 2023-07-07 RX ADMIN — SULFAMETHOXAZOLE AND TRIMETHOPRIM 0 MG: 800; 160 TABLET ORAL at 00:00

## 2023-07-09 LAB
ALBUMIN SERPL ELPH-MCNC: 4.2 G/DL
ANION GAP SERPL CALC-SCNC: 11 MMOL/L
BUN SERPL-MCNC: 22 MG/DL
CALCIUM SERPL-MCNC: 9.6 MG/DL
CHLORIDE SERPL-SCNC: 106 MMOL/L
CO2 SERPL-SCNC: 26 MMOL/L
CREAT SERPL-MCNC: 0.93 MG/DL
EGFR: 67 ML/MIN/1.73M2
GLUCOSE SERPL-MCNC: 100 MG/DL
PHOSPHATE SERPL-MCNC: 3.8 MG/DL
POTASSIUM SERPL-SCNC: 5.1 MMOL/L
SODIUM SERPL-SCNC: 142 MMOL/L

## 2023-07-10 ENCOUNTER — APPOINTMENT (OUTPATIENT)
Dept: UROLOGY | Facility: CLINIC | Age: 68
End: 2023-07-10
Payer: MEDICARE

## 2023-07-10 PROCEDURE — 76775 US EXAM ABDO BACK WALL LIM: CPT

## 2023-07-10 PROCEDURE — 99213 OFFICE O/P EST LOW 20 MIN: CPT

## 2023-07-10 NOTE — HISTORY OF PRESENT ILLNESS
[FreeTextEntry1] : 68 y/o female post felt robot-assisted pyeloplasty.\par After 1 month stent removed and hydronephrosis was detected.\par Stent placed again, 7Fr, on 01/22. Referred no more pain\par Sent removed 7/7/23.\par Today c/o mild left flank pain.\par \par Comes for new renal panel and KRISTINA\par \par \par \par \par

## 2023-07-10 NOTE — ASSESSMENT
[FreeTextEntry1] : 68 y/o female post felt robot-assisted pyeloplasty.\par After 1 month stent removed and hydronephrosis was detected.\par Stent placed again, 7Fr, on 01/22. Referred no more pain\par Sent removed 7/7/23.\par Today c/o mild left flank pain.\par \par Comes for new renal panel and KRISTINA\par \par US renal\par Findings: The right and left kidney demonstrate moderate hydronephrosis. there is a 5 mm echogenic focus in the lower pole of the right kidney with distal shadowing. Both kidneys are normal in size and echogenicity without solid masses visualized \par \par Blood draw performed.\par Will F/U for results\par \par \par \par

## 2023-07-11 ENCOUNTER — APPOINTMENT (OUTPATIENT)
Dept: UROLOGY | Facility: CLINIC | Age: 68
End: 2023-07-11

## 2023-07-11 LAB
ALBUMIN SERPL ELPH-MCNC: 3.9 G/DL
ANION GAP SERPL CALC-SCNC: 12 MMOL/L
BUN SERPL-MCNC: 28 MG/DL
CALCIUM SERPL-MCNC: 9.4 MG/DL
CHLORIDE SERPL-SCNC: 104 MMOL/L
CO2 SERPL-SCNC: 22 MMOL/L
CREAT SERPL-MCNC: 1.84 MG/DL
EGFR: 29 ML/MIN/1.73M2
GLUCOSE SERPL-MCNC: 142 MG/DL
PHOSPHATE SERPL-MCNC: 2.9 MG/DL
POTASSIUM SERPL-SCNC: 5.1 MMOL/L
SODIUM SERPL-SCNC: 138 MMOL/L

## 2023-07-11 NOTE — ED ADULT TRIAGE NOTE - HEIGHT IN FEET
[facility] Procedure at [facility]:  left message for patient to [facility] at 0900 at [facility] 7[facility] for his procedure at 1030. Patient has been given home instructions. (Paracentesis, Thoracentesis, Thyroid Bx and Injections may have a light breakfast)  2. Follow your directions as prescribed by the doctor for your procedure and medications. 3.   Consult your provider as to when to stop blood thinner  4. Do not take any pain medication within 6 hours of your procedure  5. Do not drink any alcoholic beverages or use any street drugs 24 hours before procedure. 6.   Please wear simple, loose fitting clothing to the hospital.  Do not bring valuables (money,             credit cards, checkbooks, etc.)     7. If you  have a Living Will and Durable Power of  for Healthcare, please bring in a copy. 8.   Please bring picture ID,  insurance card, paperwork from the doctors office            (H & P, Consent,  & card for implantable devices). 9.   Report to the information desk on the ground floor. 10. Take a shower the night before or morning of your procedure, do not apply any lotion, oil or powder. 11. If you are going to be sedated for the procedure, you will need a responsible adult to drive you home. 4

## 2023-07-12 ENCOUNTER — NON-APPOINTMENT (OUTPATIENT)
Age: 68
End: 2023-07-12

## 2023-07-12 ENCOUNTER — INPATIENT (INPATIENT)
Facility: HOSPITAL | Age: 68
LOS: 2 days | Discharge: HOME | End: 2023-07-15
Attending: UROLOGY | Admitting: UROLOGY
Payer: MEDICARE

## 2023-07-12 VITALS
HEIGHT: 60 IN | WEIGHT: 212.97 LBS | SYSTOLIC BLOOD PRESSURE: 116 MMHG | OXYGEN SATURATION: 97 % | RESPIRATION RATE: 18 BRPM | DIASTOLIC BLOOD PRESSURE: 59 MMHG | HEART RATE: 96 BPM | TEMPERATURE: 100 F

## 2023-07-12 DIAGNOSIS — J45.909 UNSPECIFIED ASTHMA, UNCOMPLICATED: ICD-10-CM

## 2023-07-12 DIAGNOSIS — Z79.890 HORMONE REPLACEMENT THERAPY: ICD-10-CM

## 2023-07-12 DIAGNOSIS — Z96.653 PRESENCE OF ARTIFICIAL KNEE JOINT, BILATERAL: ICD-10-CM

## 2023-07-12 DIAGNOSIS — E78.5 HYPERLIPIDEMIA, UNSPECIFIED: ICD-10-CM

## 2023-07-12 DIAGNOSIS — G47.00 INSOMNIA, UNSPECIFIED: ICD-10-CM

## 2023-07-12 DIAGNOSIS — Z98.89 OTHER SPECIFIED POSTPROCEDURAL STATES: Chronic | ICD-10-CM

## 2023-07-12 DIAGNOSIS — E03.9 HYPOTHYROIDISM, UNSPECIFIED: ICD-10-CM

## 2023-07-12 DIAGNOSIS — E66.9 OBESITY, UNSPECIFIED: ICD-10-CM

## 2023-07-12 DIAGNOSIS — Z96.0 PRESENCE OF UROGENITAL IMPLANTS: Chronic | ICD-10-CM

## 2023-07-12 DIAGNOSIS — Q62.8 OTHER CONGENITAL MALFORMATIONS OF URETER: ICD-10-CM

## 2023-07-12 DIAGNOSIS — H26.9 UNSPECIFIED CATARACT: Chronic | ICD-10-CM

## 2023-07-12 DIAGNOSIS — Z98.890 OTHER SPECIFIED POSTPROCEDURAL STATES: Chronic | ICD-10-CM

## 2023-07-12 DIAGNOSIS — R50.9 FEVER, UNSPECIFIED: ICD-10-CM

## 2023-07-12 DIAGNOSIS — Z96.659 PRESENCE OF UNSPECIFIED ARTIFICIAL KNEE JOINT: Chronic | ICD-10-CM

## 2023-07-12 DIAGNOSIS — N13.30 UNSPECIFIED HYDRONEPHROSIS: ICD-10-CM

## 2023-07-12 LAB
ALBUMIN SERPL ELPH-MCNC: 3 G/DL — LOW (ref 3.3–5)
ALP SERPL-CCNC: 96 U/L — SIGNIFICANT CHANGE UP (ref 40–120)
ALT FLD-CCNC: 21 U/L — SIGNIFICANT CHANGE UP (ref 12–78)
ANION GAP SERPL CALC-SCNC: 6 MMOL/L — SIGNIFICANT CHANGE UP (ref 5–17)
APPEARANCE UR: ABNORMAL
AST SERPL-CCNC: 13 U/L — LOW (ref 15–37)
BACTERIA # UR AUTO: ABNORMAL
BASOPHILS # BLD AUTO: 0.03 K/UL — SIGNIFICANT CHANGE UP (ref 0–0.2)
BASOPHILS NFR BLD AUTO: 0.2 % — SIGNIFICANT CHANGE UP (ref 0–2)
BILIRUB SERPL-MCNC: 0.4 MG/DL — SIGNIFICANT CHANGE UP (ref 0.2–1.2)
BILIRUB UR-MCNC: NEGATIVE — SIGNIFICANT CHANGE UP
BLD GP AB SCN SERPL QL: SIGNIFICANT CHANGE UP
BUN SERPL-MCNC: 26 MG/DL — HIGH (ref 7–23)
CALCIUM SERPL-MCNC: 9 MG/DL — SIGNIFICANT CHANGE UP (ref 8.5–10.1)
CHLORIDE SERPL-SCNC: 106 MMOL/L — SIGNIFICANT CHANGE UP (ref 96–108)
CO2 SERPL-SCNC: 25 MMOL/L — SIGNIFICANT CHANGE UP (ref 22–31)
COLOR SPEC: YELLOW — SIGNIFICANT CHANGE UP
CREAT SERPL-MCNC: 1.96 MG/DL — HIGH (ref 0.5–1.3)
DIFF PNL FLD: ABNORMAL
EGFR: 27 ML/MIN/1.73M2 — LOW
EOSINOPHIL # BLD AUTO: 0.05 K/UL — SIGNIFICANT CHANGE UP (ref 0–0.5)
EOSINOPHIL NFR BLD AUTO: 0.4 % — SIGNIFICANT CHANGE UP (ref 0–6)
GLUCOSE SERPL-MCNC: 130 MG/DL — HIGH (ref 70–99)
GLUCOSE UR QL: NEGATIVE MG/DL — SIGNIFICANT CHANGE UP
HCT VFR BLD CALC: 36.8 % — SIGNIFICANT CHANGE UP (ref 34.5–45)
HGB BLD-MCNC: 11.5 G/DL — SIGNIFICANT CHANGE UP (ref 11.5–15.5)
IMM GRANULOCYTES NFR BLD AUTO: 0.3 % — SIGNIFICANT CHANGE UP (ref 0–0.9)
KETONES UR-MCNC: NEGATIVE — SIGNIFICANT CHANGE UP
LEUKOCYTE ESTERASE UR-ACNC: ABNORMAL
LYMPHOCYTES # BLD AUTO: 1.61 K/UL — SIGNIFICANT CHANGE UP (ref 1–3.3)
LYMPHOCYTES # BLD AUTO: 12.7 % — LOW (ref 13–44)
MCHC RBC-ENTMCNC: 29.1 PG — SIGNIFICANT CHANGE UP (ref 27–34)
MCHC RBC-ENTMCNC: 31.3 G/DL — LOW (ref 32–36)
MCV RBC AUTO: 93.2 FL — SIGNIFICANT CHANGE UP (ref 80–100)
MONOCYTES # BLD AUTO: 1.28 K/UL — HIGH (ref 0–0.9)
MONOCYTES NFR BLD AUTO: 10.1 % — SIGNIFICANT CHANGE UP (ref 2–14)
NEUTROPHILS # BLD AUTO: 9.65 K/UL — HIGH (ref 1.8–7.4)
NEUTROPHILS NFR BLD AUTO: 76.3 % — SIGNIFICANT CHANGE UP (ref 43–77)
NITRITE UR-MCNC: NEGATIVE — SIGNIFICANT CHANGE UP
NRBC # BLD: 0 /100 WBCS — SIGNIFICANT CHANGE UP (ref 0–0)
PH UR: 6 — SIGNIFICANT CHANGE UP (ref 5–8)
PLATELET # BLD AUTO: 200 K/UL — SIGNIFICANT CHANGE UP (ref 150–400)
POTASSIUM SERPL-MCNC: 4.7 MMOL/L — SIGNIFICANT CHANGE UP (ref 3.5–5.3)
POTASSIUM SERPL-SCNC: 4.7 MMOL/L — SIGNIFICANT CHANGE UP (ref 3.5–5.3)
PROT SERPL-MCNC: 7.2 GM/DL — SIGNIFICANT CHANGE UP (ref 6–8.3)
PROT UR-MCNC: 500 MG/DL
RBC # BLD: 3.95 M/UL — SIGNIFICANT CHANGE UP (ref 3.8–5.2)
RBC # FLD: 13.1 % — SIGNIFICANT CHANGE UP (ref 10.3–14.5)
RBC CASTS # UR COMP ASSIST: >50 /HPF (ref 0–4)
SODIUM SERPL-SCNC: 137 MMOL/L — SIGNIFICANT CHANGE UP (ref 135–145)
SP GR SPEC: 1.01 — SIGNIFICANT CHANGE UP (ref 1.01–1.02)
UROBILINOGEN FLD QL: NEGATIVE MG/DL — SIGNIFICANT CHANGE UP
WBC # BLD: 12.66 K/UL — HIGH (ref 3.8–10.5)
WBC # FLD AUTO: 12.66 K/UL — HIGH (ref 3.8–10.5)
WBC UR QL: >50

## 2023-07-12 PROCEDURE — 71045 X-RAY EXAM CHEST 1 VIEW: CPT | Mod: 26

## 2023-07-12 PROCEDURE — 99285 EMERGENCY DEPT VISIT HI MDM: CPT | Mod: FS

## 2023-07-12 RX ORDER — SIMVASTATIN 20 MG/1
20 TABLET, FILM COATED ORAL AT BEDTIME
Refills: 0 | Status: DISCONTINUED | OUTPATIENT
Start: 2023-07-12 | End: 2023-07-13

## 2023-07-12 RX ORDER — CEFTRIAXONE 500 MG/1
1000 INJECTION, POWDER, FOR SOLUTION INTRAMUSCULAR; INTRAVENOUS EVERY 24 HOURS
Refills: 0 | Status: DISCONTINUED | OUTPATIENT
Start: 2023-07-13 | End: 2023-07-13

## 2023-07-12 RX ORDER — HEPARIN SODIUM 5000 [USP'U]/ML
7500 INJECTION INTRAVENOUS; SUBCUTANEOUS EVERY 12 HOURS
Refills: 0 | Status: DISCONTINUED | OUTPATIENT
Start: 2023-07-12 | End: 2023-07-13

## 2023-07-12 RX ORDER — MORPHINE SULFATE 50 MG/1
1 CAPSULE, EXTENDED RELEASE ORAL EVERY 6 HOURS
Refills: 0 | Status: DISCONTINUED | OUTPATIENT
Start: 2023-07-12 | End: 2023-07-13

## 2023-07-12 RX ORDER — MORPHINE SULFATE 50 MG/1
2 CAPSULE, EXTENDED RELEASE ORAL EVERY 6 HOURS
Refills: 0 | Status: DISCONTINUED | OUTPATIENT
Start: 2023-07-12 | End: 2023-07-13

## 2023-07-12 RX ORDER — LEVOTHYROXINE SODIUM 125 MCG
175 TABLET ORAL DAILY
Refills: 0 | Status: DISCONTINUED | OUTPATIENT
Start: 2023-07-12 | End: 2023-07-13

## 2023-07-12 RX ORDER — ACETAMINOPHEN 500 MG
650 TABLET ORAL ONCE
Refills: 0 | Status: COMPLETED | OUTPATIENT
Start: 2023-07-12 | End: 2023-07-12

## 2023-07-12 RX ORDER — ACETAMINOPHEN 500 MG
650 TABLET ORAL EVERY 6 HOURS
Refills: 0 | Status: DISCONTINUED | OUTPATIENT
Start: 2023-07-12 | End: 2023-07-13

## 2023-07-12 RX ORDER — SODIUM CHLORIDE 9 MG/ML
1000 INJECTION, SOLUTION INTRAVENOUS
Refills: 0 | Status: DISCONTINUED | OUTPATIENT
Start: 2023-07-13 | End: 2023-07-13

## 2023-07-12 RX ORDER — TAMSULOSIN HYDROCHLORIDE 0.4 MG/1
0.4 CAPSULE ORAL AT BEDTIME
Refills: 0 | Status: DISCONTINUED | OUTPATIENT
Start: 2023-07-12 | End: 2023-07-13

## 2023-07-12 RX ORDER — CEFTRIAXONE 500 MG/1
INJECTION, POWDER, FOR SOLUTION INTRAMUSCULAR; INTRAVENOUS
Refills: 0 | Status: DISCONTINUED | OUTPATIENT
Start: 2023-07-12 | End: 2023-07-13

## 2023-07-12 RX ORDER — CEFTRIAXONE 500 MG/1
1000 INJECTION, POWDER, FOR SOLUTION INTRAMUSCULAR; INTRAVENOUS ONCE
Refills: 0 | Status: COMPLETED | OUTPATIENT
Start: 2023-07-12 | End: 2023-07-12

## 2023-07-12 RX ADMIN — TAMSULOSIN HYDROCHLORIDE 0.4 MILLIGRAM(S): 0.4 CAPSULE ORAL at 21:12

## 2023-07-12 RX ADMIN — CEFTRIAXONE 100 MILLIGRAM(S): 500 INJECTION, POWDER, FOR SOLUTION INTRAMUSCULAR; INTRAVENOUS at 18:30

## 2023-07-12 RX ADMIN — HEPARIN SODIUM 7500 UNIT(S): 5000 INJECTION INTRAVENOUS; SUBCUTANEOUS at 18:31

## 2023-07-12 RX ADMIN — Medication 650 MILLIGRAM(S): at 16:43

## 2023-07-12 RX ADMIN — SIMVASTATIN 20 MILLIGRAM(S): 20 TABLET, FILM COATED ORAL at 21:12

## 2023-07-12 NOTE — PATIENT PROFILE ADULT - FALL HARM RISK - HARM RISK INTERVENTIONS

## 2023-07-12 NOTE — H&P ADULT - HISTORY OF PRESENT ILLNESS
Patient is 67 Panamanian speaking female PMHx obesity, HLD, hypothyroidism, with congential ureteral strictures s/p robotic pyloroplasty  with stent insertion 12/2022, presents two days after stent removal in office with fever and pain. Patient endorses history of issues with stents in past. Per chart review, patient had stent that was placed in December removed in office one month later in January two days later developed pain, found to have ELLI and required new left ureteral stent placement on 1/22/23. Patient explains she recently had stent removed in office by Dr. Josue two days ago, on 7/10. She presented today to urgent care because she had fever, found to have leuks in urine, which prompted her visit. Per Dr. Josue, patient's right kidney is atrophied and nonfunctioning secondary to congential stricture. Patient only has one functioning kidney. Patient endorse mild flank pain, fever. Denies SOB, CP, nausea, vomiting.

## 2023-07-12 NOTE — ED PROVIDER NOTE - NS ED ATTENDING STATEMENT MOD
This was a shared visit with the MATTIE. I reviewed and verified the documentation and independently performed the documented:

## 2023-07-12 NOTE — ED PROVIDER NOTE - PHYSICAL EXAMINATION
VITAL SIGNS: I have reviewed nursing notes and confirm.  CONSTITUTIONAL: Well-developed; well-nourished; in no acute distress.  SKIN: Skin is warm and dry, no acute rash.  HEAD: Normocephalic; atraumatic.  NECK: Supple; non tender.  CARD: S1, S2 normal; no murmurs, gallops, or rubs. Regular rate and rhythm.  RESP: No wheezes, rales or rhonchi.  ABD: Soft; non-distended; non-tender; mild bloating; no rebound or guarding.  EXT: Normal ROM. No clubbing, cyanosis or edema.  NEURO: Alert, oriented. Grossly unremarkable. BACON, normal tone, no gross motor or sensory changes. Fluent speech.   PSYCH: Cooperative, appropriate. Mood and affect wnl.

## 2023-07-12 NOTE — ED ADULT NURSE NOTE - NSFALLRISKINTERV_ED_ALL_ED

## 2023-07-12 NOTE — ED PROVIDER NOTE - OBJECTIVE STATEMENT
66 yo F, pmhx obesity, HLD, hypothyroidism, left pyeloplasty and bilateral ureteral stent placement on 7/7/23 @ [facility], 66 yo F, pmhx obesity, HLD, hypothyroidism, left pyeloplasty and bilateral ureteral stent placement on Jan 2023 @ [facility], Presenting to the emergency room complaining of 4 days of loose stool and right upper abdominal/flank bloating.  Patient had a ureteral stent removed on July 7, 2023. 68 yo F, pmhx obesity, HLD, hypothyroidism, left pyeloplasty and bilateral ureteral stent placement on Jan 2023 @ [facility], Presenting to the emergency room complaining of 4 days of loose stool and right upper abdominal/flank bloating.  Patient had a ureteral stent removed on July 7, 2023. She subsequently developed the symptoms which prompted her to come to go to urgent care who diagnosed her with a urinary tract infection.  She called her urologist who recommended she come to the nearest ED for further evaluation.

## 2023-07-12 NOTE — ED PROVIDER NOTE - ATTENDING APP SHARED VISIT CONTRIBUTION OF CARE
68yo female with pmh hld, hypothyroidism, congential ureteral strictures s/p robotic pyloroplasty with stent insertion 12/2022, presents two days after stent removal in office with fever and pain.  Went to UC and dx uti, spoke with urologist and instructed to come to ED.  Patient comfortable appearing, reassuring exam, nontoxic.  Plan for labs, urine, +/- abx.  D/w urology.

## 2023-07-12 NOTE — ED ADULT NURSE NOTE - OBJECTIVE STATEMENT
patient alert and oriented x4, came in for medical evaluation. pt states she started having loose stools for 4x days associated with abdominal pain and flank pain. pt had bilateral stent placement in jan. 2023, and recent stent removal this past week at urologist. pt was sent from urgent care for fevers and abdominal/flank pain. pt OOB with cane, steady gait. pmh of hypothyroidism, HLD. pt denies chest pain, SOB, dizziness, blurred vision, nausea, vomiting.

## 2023-07-12 NOTE — H&P ADULT - ASSESSMENT
67 Ecuadorean speaking female PMHx obesity, HLD, hypothyroidism, with congential ureteral strictures s/p robotic pyloroplasty  with stent insertion 12/2022, presents two days after stent removal in office with fever. Febrile 100.4, milddly tachycardiac HR 97, with leukocytosis 12.22, ELLI Cr 1.96, urinalaysis, UCx, Bcx pending. Patient mets SIRs criteria +fever, HR>90, WBC >12.    - admit to Dr. Josue, plan for metal ureteral stent insertion in OR tomorrow   - Rocephin for leukocytosis and presumed  infection  - f/u U/A, Ucx, Bcx x2    -will pre-op: NPO pm, IVFs, pre-op labs, consent   - continue home medications as prescribed  - DVT ppx, pain control, OOB/AAT,   - discussed and seen with Dr. Josue

## 2023-07-12 NOTE — ED PROVIDER NOTE - NS ED ROS FT
+fever  +abd bloating  +loose stools  Denies nausea, vomiting, constipation, urinary symptoms, chest pain, palpitations, shortness of breath, dyspnea on exertion, syncope/near syncope, cough/URI symptoms, headache, weakness, numbness, focal deficits, visual changes, gait or balance changes, dizziness

## 2023-07-12 NOTE — ED PROVIDER NOTE - CLINICAL SUMMARY MEDICAL DECISION MAKING FREE TEXT BOX
68 yo F, pmhx obesity, HLD, hypothyroidism, left pyeloplasty and bilateral ureteral stent placement on Jan 2023 @ [facility], Presenting to the emergency room complaining of 4 days of loose stool and right upper abdominal/flank bloating. No red flags noted on exam and patient's abdomen is soft.  Case discussed with patient's urologist who recommends admitting her under his service.  Medical labs sent out.  Patient stable at this time and awaiting bed placement.

## 2023-07-12 NOTE — ED ADULT NURSE NOTE - NSICDXFAMILYHX_GEN_ALL_CORE_FT
04/05/23      Magaly Dorsey  [address] [place] [address]       Avery Carmona,     Our office has attempted contacting you without success. Please call our office at [phone] and speak to a nurse.   We need to set up an earlier follow up appointment for you to see Dr. Murray.      Sincerely,         Domi Velasquez/Dr. Murray  [facility]  [address]  [place] [address]  Phone: [phone]               FAMILY HISTORY:  No pertinent family history in first degree relatives

## 2023-07-12 NOTE — H&P ADULT - NSICDXPASTSURGICALHX_GEN_ALL_CORE_FT
PAST SURGICAL HISTORY:  Cataract b/l surgery    H/O total knee replacement b/l    History of pyloroplasty     S/P  section     S/P lumbar laminectomy     S/P ureteral stent placement

## 2023-07-12 NOTE — ED ADULT TRIAGE NOTE - CHIEF COMPLAINT QUOTE
patient with 4 days of loose stool and abdominal  with flank pains. urethral stent removed on 7/7/23,  found to be febrile  and tachycardic  with leukocytes in urine at the urgent care today, sent for iv antibiotics.

## 2023-07-13 LAB
ANION GAP SERPL CALC-SCNC: 4 MMOL/L — LOW (ref 5–17)
APTT BLD: 29 SEC — SIGNIFICANT CHANGE UP (ref 27.5–35.5)
BLD GP AB SCN SERPL QL: SIGNIFICANT CHANGE UP
BUN SERPL-MCNC: 25 MG/DL — HIGH (ref 7–23)
CALCIUM SERPL-MCNC: 8.9 MG/DL — SIGNIFICANT CHANGE UP (ref 8.5–10.1)
CHLORIDE SERPL-SCNC: 110 MMOL/L — HIGH (ref 96–108)
CO2 SERPL-SCNC: 27 MMOL/L — SIGNIFICANT CHANGE UP (ref 22–31)
CREAT SERPL-MCNC: 1.77 MG/DL — HIGH (ref 0.5–1.3)
CULTURE RESULTS: SIGNIFICANT CHANGE UP
EGFR: 31 ML/MIN/1.73M2 — LOW
GLUCOSE SERPL-MCNC: 124 MG/DL — HIGH (ref 70–99)
HCT VFR BLD CALC: 33.6 % — LOW (ref 34.5–45)
HGB BLD-MCNC: 10.7 G/DL — LOW (ref 11.5–15.5)
INR BLD: 1.19 RATIO — HIGH (ref 0.88–1.16)
MAGNESIUM SERPL-MCNC: 2.4 MG/DL — SIGNIFICANT CHANGE UP (ref 1.6–2.6)
MCHC RBC-ENTMCNC: 29.5 PG — SIGNIFICANT CHANGE UP (ref 27–34)
MCHC RBC-ENTMCNC: 31.8 G/DL — LOW (ref 32–36)
MCV RBC AUTO: 92.6 FL — SIGNIFICANT CHANGE UP (ref 80–100)
NRBC # BLD: 0 /100 WBCS — SIGNIFICANT CHANGE UP (ref 0–0)
PHOSPHATE SERPL-MCNC: 3.3 MG/DL — SIGNIFICANT CHANGE UP (ref 2.5–4.5)
PLATELET # BLD AUTO: 173 K/UL — SIGNIFICANT CHANGE UP (ref 150–400)
POTASSIUM SERPL-MCNC: 4.6 MMOL/L — SIGNIFICANT CHANGE UP (ref 3.5–5.3)
POTASSIUM SERPL-SCNC: 4.6 MMOL/L — SIGNIFICANT CHANGE UP (ref 3.5–5.3)
PROTHROM AB SERPL-ACNC: 14.3 SEC — HIGH (ref 10.5–13.4)
RBC # BLD: 3.63 M/UL — LOW (ref 3.8–5.2)
RBC # FLD: 12.9 % — SIGNIFICANT CHANGE UP (ref 10.3–14.5)
SODIUM SERPL-SCNC: 141 MMOL/L — SIGNIFICANT CHANGE UP (ref 135–145)
SPECIMEN SOURCE: SIGNIFICANT CHANGE UP
WBC # BLD: 9.55 K/UL — SIGNIFICANT CHANGE UP (ref 3.8–10.5)
WBC # FLD AUTO: 9.55 K/UL — SIGNIFICANT CHANGE UP (ref 3.8–10.5)

## 2023-07-13 PROCEDURE — 93010 ELECTROCARDIOGRAM REPORT: CPT

## 2023-07-13 PROCEDURE — 50382 CHANGE URETER STENT PERCUT: CPT | Mod: 50

## 2023-07-13 DEVICE — URETERAL CATH OPEN END FLEXI-TIP 5FR .038" X 70CM: Type: IMPLANTABLE DEVICE | Status: FUNCTIONAL

## 2023-07-13 DEVICE — IMPLANTABLE DEVICE: Type: IMPLANTABLE DEVICE | Status: FUNCTIONAL

## 2023-07-13 RX ORDER — MORPHINE SULFATE 50 MG/1
1 CAPSULE, EXTENDED RELEASE ORAL EVERY 6 HOURS
Refills: 0 | Status: DISCONTINUED | OUTPATIENT
Start: 2023-07-13 | End: 2023-07-15

## 2023-07-13 RX ORDER — SIMVASTATIN 20 MG/1
20 TABLET, FILM COATED ORAL AT BEDTIME
Refills: 0 | Status: DISCONTINUED | OUTPATIENT
Start: 2023-07-13 | End: 2023-07-15

## 2023-07-13 RX ORDER — ACETAMINOPHEN 500 MG
650 TABLET ORAL EVERY 6 HOURS
Refills: 0 | Status: DISCONTINUED | OUTPATIENT
Start: 2023-07-13 | End: 2023-07-15

## 2023-07-13 RX ORDER — TAMSULOSIN HYDROCHLORIDE 0.4 MG/1
0.4 CAPSULE ORAL AT BEDTIME
Refills: 0 | Status: DISCONTINUED | OUTPATIENT
Start: 2023-07-13 | End: 2023-07-15

## 2023-07-13 RX ORDER — MORPHINE SULFATE 50 MG/1
2 CAPSULE, EXTENDED RELEASE ORAL EVERY 6 HOURS
Refills: 0 | Status: DISCONTINUED | OUTPATIENT
Start: 2023-07-13 | End: 2023-07-15

## 2023-07-13 RX ORDER — CEFTRIAXONE 500 MG/1
1000 INJECTION, POWDER, FOR SOLUTION INTRAMUSCULAR; INTRAVENOUS EVERY 24 HOURS
Refills: 0 | Status: DISCONTINUED | OUTPATIENT
Start: 2023-07-13 | End: 2023-07-15

## 2023-07-13 RX ORDER — ONDANSETRON 8 MG/1
4 TABLET, FILM COATED ORAL ONCE
Refills: 0 | Status: DISCONTINUED | OUTPATIENT
Start: 2023-07-13 | End: 2023-07-13

## 2023-07-13 RX ORDER — LEVOTHYROXINE SODIUM 125 MCG
175 TABLET ORAL DAILY
Refills: 0 | Status: DISCONTINUED | OUTPATIENT
Start: 2023-07-13 | End: 2023-07-15

## 2023-07-13 RX ORDER — SODIUM CHLORIDE 9 MG/ML
1000 INJECTION, SOLUTION INTRAVENOUS
Refills: 0 | Status: DISCONTINUED | OUTPATIENT
Start: 2023-07-13 | End: 2023-07-13

## 2023-07-13 RX ORDER — HYDROMORPHONE HYDROCHLORIDE 2 MG/ML
0.5 INJECTION INTRAMUSCULAR; INTRAVENOUS; SUBCUTANEOUS
Refills: 0 | Status: DISCONTINUED | OUTPATIENT
Start: 2023-07-13 | End: 2023-07-13

## 2023-07-13 RX ORDER — SODIUM CHLORIDE 9 MG/ML
1000 INJECTION, SOLUTION INTRAVENOUS
Refills: 0 | Status: DISCONTINUED | OUTPATIENT
Start: 2023-07-13 | End: 2023-07-15

## 2023-07-13 RX ADMIN — Medication 650 MILLIGRAM(S): at 01:00

## 2023-07-13 RX ADMIN — Medication 650 MILLIGRAM(S): at 00:01

## 2023-07-13 RX ADMIN — SIMVASTATIN 20 MILLIGRAM(S): 20 TABLET, FILM COATED ORAL at 22:06

## 2023-07-13 RX ADMIN — Medication 650 MILLIGRAM(S): at 05:29

## 2023-07-13 RX ADMIN — SODIUM CHLORIDE 50 MILLILITER(S): 9 INJECTION, SOLUTION INTRAVENOUS at 19:04

## 2023-07-13 RX ADMIN — SODIUM CHLORIDE 140 MILLILITER(S): 9 INJECTION, SOLUTION INTRAVENOUS at 05:29

## 2023-07-13 RX ADMIN — SODIUM CHLORIDE 100 MILLILITER(S): 9 INJECTION, SOLUTION INTRAVENOUS at 22:08

## 2023-07-13 RX ADMIN — Medication 650 MILLIGRAM(S): at 00:12

## 2023-07-13 RX ADMIN — Medication 650 MILLIGRAM(S): at 11:36

## 2023-07-13 RX ADMIN — CEFTRIAXONE 100 MILLIGRAM(S): 500 INJECTION, POWDER, FOR SOLUTION INTRAMUSCULAR; INTRAVENOUS at 22:07

## 2023-07-13 RX ADMIN — Medication 175 MICROGRAM(S): at 05:34

## 2023-07-13 RX ADMIN — TAMSULOSIN HYDROCHLORIDE 0.4 MILLIGRAM(S): 0.4 CAPSULE ORAL at 22:06

## 2023-07-13 RX ADMIN — Medication 650 MILLIGRAM(S): at 23:07

## 2023-07-13 RX ADMIN — Medication 650 MILLIGRAM(S): at 22:07

## 2023-07-13 NOTE — PROGRESS NOTE ADULT - SUBJECTIVE AND OBJECTIVE BOX
Post-op check    S/P cysto, b/l metal stents POD#0  Pt seen and examined at bedside. Denies pain or complaints. Voiding well. Denies chest pain, shortness of breath, nausea/ vomiting, and dizziness.     Vital Signs Last 24 Hrs  T(F): 100 (07-13-23 @ 21:20), Max: 100 (07-13-23 @ 21:20)  HR: 99 (07-13-23 @ 21:20)  BP: 112/70 (07-13-23 @ 21:20)  RR: 18 (07-13-23 @ 21:20)  SpO2: 92% (07-13-23 @ 21:20)      CONSTITUTIONAL: Alert, NAD  RESPIRATORY: Clear to auscultation bilaterally, respirations nonlabored  CARDIOVASCULAR: S1S2, Regular rate and rhythm  GASTROINTESTINAL: soft NTND  : No suprapubic tenderness or bladder distention  MUSCULOSKELETAL: No calf tenderness, No edema      Assessment: 67F S/P cysto, b/l metal stents POD#0    Plan:   - DVT prophylaxis, Incentive Spirometer, OOB, Ambulating, pain control  - Continue antibiotics   - f/u labs and ucx

## 2023-07-13 NOTE — PROGRESS NOTE ADULT - SUBJECTIVE AND OBJECTIVE BOX
Pre-operative Note    - Pre-operative Diagnosis: ureteral stricture    - Procedure: cystoscopy, left ureteroscopy, left stent placement     - Labs:                        10.7   9.55  )-----------( 173      ( 13 Jul 2023 08:13 )             33.6     07-13    141  |  110<H>  |  25<H>  ----------------------------<  124<H>  4.6   |  27  |  1.77<H>    Ca    8.9      13 Jul 2023 08:13  Phos  3.3     07-13  Mg     2.4     07-13    TPro  7.2  /  Alb  3.0<L>  /  TBili  0.4  /  DBili  x   /  AST  13<L>  /  ALT  21  /  AlkPhos  96  07-12    PT/INR - ( 13 Jul 2023 08:13 )   PT: 14.3 sec;   INR: 1.19 ratio         PTT - ( 13 Jul 2023 08:13 )  PTT:29.0 sec    - Blood: Not needed.     - Orders:  > NPO at midnight  > Perioperative antibiotics not needed.  > Morning Labs: CBC, BMP, coags, type & screen    - Permits:  > Consent in chart.  > Case scheduled with OR.     Pre-operative Note    - Pre-operative Diagnosis: UPJ stricture    - Procedure: cystoscopy, bilateral retrograde ureterogram, bilateral metal stent placement     - Labs:                        10.7   9.55  )-----------( 173      ( 13 Jul 2023 08:13 )             33.6     07-13    141  |  110<H>  |  25<H>  ----------------------------<  124<H>  4.6   |  27  |  1.77<H>    Ca    8.9      13 Jul 2023 08:13  Phos  3.3     07-13  Mg     2.4     07-13    TPro  7.2  /  Alb  3.0<L>  /  TBili  0.4  /  DBili  x   /  AST  13<L>  /  ALT  21  /  AlkPhos  96  07-12    PT/INR - ( 13 Jul 2023 08:13 )   PT: 14.3 sec;   INR: 1.19 ratio         PTT - ( 13 Jul 2023 08:13 )  PTT:29.0 sec    - Blood: Not needed.     - Orders:  > NPO at midnight  > Perioperative antibiotics not needed.  > Morning Labs: CBC, BMP, coags, type & screen    - Permits:  > Consent in chart.  > Case scheduled with OR.

## 2023-07-14 ENCOUNTER — TRANSCRIPTION ENCOUNTER (OUTPATIENT)
Age: 68
End: 2023-07-14

## 2023-07-14 ENCOUNTER — APPOINTMENT (OUTPATIENT)
Dept: UROLOGY | Facility: CLINIC | Age: 68
End: 2023-07-14

## 2023-07-14 LAB
ALBUMIN SERPL ELPH-MCNC: 2.3 G/DL — LOW (ref 3.3–5)
ALP SERPL-CCNC: 96 U/L — SIGNIFICANT CHANGE UP (ref 40–120)
ALT FLD-CCNC: 21 U/L — SIGNIFICANT CHANGE UP (ref 12–78)
ANION GAP SERPL CALC-SCNC: 4 MMOL/L — LOW (ref 5–17)
ANION GAP SERPL CALC-SCNC: 6 MMOL/L — SIGNIFICANT CHANGE UP (ref 5–17)
ANION GAP SERPL CALC-SCNC: 7 MMOL/L — SIGNIFICANT CHANGE UP (ref 5–17)
AST SERPL-CCNC: 20 U/L — SIGNIFICANT CHANGE UP (ref 15–37)
BASOPHILS # BLD AUTO: 0.05 K/UL — SIGNIFICANT CHANGE UP (ref 0–0.2)
BASOPHILS NFR BLD AUTO: 0.5 % — SIGNIFICANT CHANGE UP (ref 0–2)
BILIRUB SERPL-MCNC: 0.3 MG/DL — SIGNIFICANT CHANGE UP (ref 0.2–1.2)
BUN SERPL-MCNC: 23 MG/DL — SIGNIFICANT CHANGE UP (ref 7–23)
BUN SERPL-MCNC: 24 MG/DL — HIGH (ref 7–23)
BUN SERPL-MCNC: 25 MG/DL — HIGH (ref 7–23)
CALCIUM SERPL-MCNC: 7.8 MG/DL — LOW (ref 8.5–10.1)
CALCIUM SERPL-MCNC: 8.4 MG/DL — LOW (ref 8.5–10.1)
CALCIUM SERPL-MCNC: 8.6 MG/DL — SIGNIFICANT CHANGE UP (ref 8.5–10.1)
CHLORIDE SERPL-SCNC: 101 MMOL/L — SIGNIFICANT CHANGE UP (ref 96–108)
CHLORIDE SERPL-SCNC: 105 MMOL/L — SIGNIFICANT CHANGE UP (ref 96–108)
CHLORIDE SERPL-SCNC: 106 MMOL/L — SIGNIFICANT CHANGE UP (ref 96–108)
CO2 SERPL-SCNC: 24 MMOL/L — SIGNIFICANT CHANGE UP (ref 22–31)
CO2 SERPL-SCNC: 24 MMOL/L — SIGNIFICANT CHANGE UP (ref 22–31)
CO2 SERPL-SCNC: 26 MMOL/L — SIGNIFICANT CHANGE UP (ref 22–31)
CREAT SERPL-MCNC: 1.65 MG/DL — HIGH (ref 0.5–1.3)
CREAT SERPL-MCNC: 1.81 MG/DL — HIGH (ref 0.5–1.3)
CREAT SERPL-MCNC: 1.93 MG/DL — HIGH (ref 0.5–1.3)
EGFR: 28 ML/MIN/1.73M2 — LOW
EGFR: 30 ML/MIN/1.73M2 — LOW
EGFR: 33 ML/MIN/1.73M2 — LOW
EOSINOPHIL # BLD AUTO: 0.06 K/UL — SIGNIFICANT CHANGE UP (ref 0–0.5)
EOSINOPHIL NFR BLD AUTO: 0.6 % — SIGNIFICANT CHANGE UP (ref 0–6)
GLUCOSE SERPL-MCNC: 137 MG/DL — HIGH (ref 70–99)
GLUCOSE SERPL-MCNC: 142 MG/DL — HIGH (ref 70–99)
GLUCOSE SERPL-MCNC: 153 MG/DL — HIGH (ref 70–99)
HCT VFR BLD CALC: 32.1 % — LOW (ref 34.5–45)
HGB BLD-MCNC: 10.1 G/DL — LOW (ref 11.5–15.5)
IMM GRANULOCYTES NFR BLD AUTO: 0.3 % — SIGNIFICANT CHANGE UP (ref 0–0.9)
LYMPHOCYTES # BLD AUTO: 0.7 K/UL — LOW (ref 1–3.3)
LYMPHOCYTES # BLD AUTO: 6.8 % — LOW (ref 13–44)
MCHC RBC-ENTMCNC: 29.3 PG — SIGNIFICANT CHANGE UP (ref 27–34)
MCHC RBC-ENTMCNC: 31.5 G/DL — LOW (ref 32–36)
MCV RBC AUTO: 93 FL — SIGNIFICANT CHANGE UP (ref 80–100)
MONOCYTES # BLD AUTO: 0.96 K/UL — HIGH (ref 0–0.9)
MONOCYTES NFR BLD AUTO: 9.3 % — SIGNIFICANT CHANGE UP (ref 2–14)
NEUTROPHILS # BLD AUTO: 8.52 K/UL — HIGH (ref 1.8–7.4)
NEUTROPHILS NFR BLD AUTO: 82.5 % — HIGH (ref 43–77)
NRBC # BLD: 0 /100 WBCS — SIGNIFICANT CHANGE UP (ref 0–0)
PLATELET # BLD AUTO: 170 K/UL — SIGNIFICANT CHANGE UP (ref 150–400)
POTASSIUM SERPL-MCNC: 3.9 MMOL/L — SIGNIFICANT CHANGE UP (ref 3.5–5.3)
POTASSIUM SERPL-MCNC: 4.4 MMOL/L — SIGNIFICANT CHANGE UP (ref 3.5–5.3)
POTASSIUM SERPL-MCNC: 4.8 MMOL/L — SIGNIFICANT CHANGE UP (ref 3.5–5.3)
POTASSIUM SERPL-SCNC: 3.9 MMOL/L — SIGNIFICANT CHANGE UP (ref 3.5–5.3)
POTASSIUM SERPL-SCNC: 4.4 MMOL/L — SIGNIFICANT CHANGE UP (ref 3.5–5.3)
POTASSIUM SERPL-SCNC: 4.8 MMOL/L — SIGNIFICANT CHANGE UP (ref 3.5–5.3)
PROT SERPL-MCNC: 6.4 GM/DL — SIGNIFICANT CHANGE UP (ref 6–8.3)
RBC # BLD: 3.45 M/UL — LOW (ref 3.8–5.2)
RBC # FLD: 13.2 % — SIGNIFICANT CHANGE UP (ref 10.3–14.5)
SODIUM SERPL-SCNC: 133 MMOL/L — LOW (ref 135–145)
SODIUM SERPL-SCNC: 134 MMOL/L — LOW (ref 135–145)
SODIUM SERPL-SCNC: 136 MMOL/L — SIGNIFICANT CHANGE UP (ref 135–145)
WBC # BLD: 10.32 K/UL — SIGNIFICANT CHANGE UP (ref 3.8–10.5)
WBC # FLD AUTO: 10.32 K/UL — SIGNIFICANT CHANGE UP (ref 3.8–10.5)

## 2023-07-14 RX ORDER — SODIUM CHLORIDE 9 MG/ML
500 INJECTION INTRAMUSCULAR; INTRAVENOUS; SUBCUTANEOUS ONCE
Refills: 0 | Status: COMPLETED | OUTPATIENT
Start: 2023-07-14 | End: 2023-07-14

## 2023-07-14 RX ORDER — FUROSEMIDE 40 MG
10 TABLET ORAL ONCE
Refills: 0 | Status: COMPLETED | OUTPATIENT
Start: 2023-07-14 | End: 2023-07-14

## 2023-07-14 RX ORDER — FUROSEMIDE 40 MG
20 TABLET ORAL ONCE
Refills: 0 | Status: DISCONTINUED | OUTPATIENT
Start: 2023-07-14 | End: 2023-07-14

## 2023-07-14 RX ADMIN — Medication 650 MILLIGRAM(S): at 06:22

## 2023-07-14 RX ADMIN — CEFTRIAXONE 100 MILLIGRAM(S): 500 INJECTION, POWDER, FOR SOLUTION INTRAMUSCULAR; INTRAVENOUS at 21:43

## 2023-07-14 RX ADMIN — Medication 650 MILLIGRAM(S): at 00:18

## 2023-07-14 RX ADMIN — SIMVASTATIN 20 MILLIGRAM(S): 20 TABLET, FILM COATED ORAL at 21:43

## 2023-07-14 RX ADMIN — Medication 650 MILLIGRAM(S): at 11:10

## 2023-07-14 RX ADMIN — Medication 650 MILLIGRAM(S): at 01:00

## 2023-07-14 RX ADMIN — Medication 650 MILLIGRAM(S): at 18:00

## 2023-07-14 RX ADMIN — TAMSULOSIN HYDROCHLORIDE 0.4 MILLIGRAM(S): 0.4 CAPSULE ORAL at 21:43

## 2023-07-14 RX ADMIN — SODIUM CHLORIDE 500 MILLILITER(S): 9 INJECTION INTRAMUSCULAR; INTRAVENOUS; SUBCUTANEOUS at 11:10

## 2023-07-14 RX ADMIN — Medication 650 MILLIGRAM(S): at 12:10

## 2023-07-14 RX ADMIN — Medication 10 MILLIGRAM(S): at 17:08

## 2023-07-14 RX ADMIN — Medication 175 MICROGRAM(S): at 06:22

## 2023-07-14 RX ADMIN — SODIUM CHLORIDE 500 MILLILITER(S): 9 INJECTION INTRAMUSCULAR; INTRAVENOUS; SUBCUTANEOUS at 16:30

## 2023-07-14 RX ADMIN — SODIUM CHLORIDE 100 MILLILITER(S): 9 INJECTION, SOLUTION INTRAVENOUS at 06:23

## 2023-07-14 RX ADMIN — Medication 650 MILLIGRAM(S): at 17:08

## 2023-07-14 NOTE — DISCHARGE NOTE PROVIDER - NSDCFUADDAPPT_GEN_ALL_CORE_FT
Please call to schedule follow up appointment with Dr. Josue in 14 days.  Please call to schedule follow up appointment with Dr. Josue in 7 days.

## 2023-07-14 NOTE — DISCHARGE NOTE PROVIDER - NSDCFUADDINST_GEN_ALL_CORE_FT
Activity as tolerated. Rest as needed. You may eat a regular diet.      You may take  Tylenol for pain every 6 hours. Take prescribed medications as directed.     Call for any fever over 101, nausea, vomiting, severe pain, worsening pain, inability to pass gas or stool, shortness of breath, chest pain, inability to urinate, back pains.

## 2023-07-14 NOTE — DISCHARGE NOTE PROVIDER - NSDCMRMEDTOKEN_GEN_ALL_CORE_FT
acetaminophen 325 mg oral tablet: 3 tab(s) orally every 6 hours as needed for pain, alternate with ibuprofen  ibuprofen 200 mg oral tablet: 2 tab(s) orally every 6 hours as needed  for pain, alternate with acetaminophen  simvastatin 20 mg oral tablet: 1 tab(s) orally once a day (at bedtime)  Synthroid 175 mcg (0.175 mg) oral tablet: 1 tab(s) orally once a day am  tamsulosin 0.4 mg oral capsule: 1 cap(s) orally once a day (at bedtime)  Vitamin D3: orally once a day   acetaminophen 325 mg oral tablet: 3 tab(s) orally every 6 hours as needed for pain, alternate with ibuprofen  cefdinir 300 mg oral capsule: 1 cap(s) orally 2 times a day  ibuprofen 200 mg oral tablet: 2 tab(s) orally every 6 hours as needed  for pain, alternate with acetaminophen  simvastatin 20 mg oral tablet: 1 tab(s) orally once a day (at bedtime)  Synthroid 175 mcg (0.175 mg) oral tablet: 1 tab(s) orally once a day am  tamsulosin 0.4 mg oral capsule: 1 cap(s) orally once a day (at bedtime)  Vitamin D3: orally once a day

## 2023-07-14 NOTE — PROGRESS NOTE ADULT - ASSESSMENT
67 Mongolian speaking female PMHx obesity, HLD, hypothyroidism, with congential ureteral strictures s/p robotic pyloroplasty  with stent insertion 12/2022, now POD 1 s/p cystoscopy and bilateral ureteral metal stent placement.    - f/u AM Cr  - plan to d/c home later today  - flomax, pyridium PRN for urinary discomfort   - cont home medications as prescribed  - f/u with Dr. Josue in 1 year for metal stent removal  - will discuss with Dr. Valles     67 Turkmen speaking female PMHx obesity, HLD, hypothyroidism, with congential UPJ Obstruction, s/p robotic pyloroplasty  with stent insertion 12/2022. Developed hydronephrosis.  now POD 1 s/p cystoscopy and bilateral ureteral metal stent placement.    - f/u AM Cr  - plan to d/c home later today  - flomax,  - pyridium PRN for urinary discomfort   - cont home medications as prescribed  - f/u with Dr. Josue in 1 year for metal stent removal  - will discuss with Dr. Valles

## 2023-07-14 NOTE — DISCHARGE NOTE PROVIDER - NSDCCPCAREPLAN_GEN_ALL_CORE_FT
PRINCIPAL DISCHARGE DIAGNOSIS  Diagnosis: ELLI (acute kidney injury)  Assessment and Plan of Treatment:       SECONDARY DISCHARGE DIAGNOSES  Diagnosis: Fever  Assessment and Plan of Treatment:     Diagnosis: Leukocytosis  Assessment and Plan of Treatment:

## 2023-07-14 NOTE — PROGRESS NOTE ADULT - SUBJECTIVE AND OBJECTIVE BOX
POD 1 s/p cystoscopy, bilateral metal ureteral stent placement.  Patient seen and examined at bedside, patient without complaints.   Tolerating regular diet without N/V.  Denies flank pain, burning with urination, fever, chills.     MEDICATIONS  (STANDING):  acetaminophen     Tablet .. 650 milliGRAM(s) Oral every 6 hours  cefTRIAXone   IVPB 1000 milliGRAM(s) IV Intermittent every 24 hours  lactated ringers. 1000 milliLiter(s) (100 mL/Hr) IV Continuous <Continuous>  levothyroxine 175 MICROGram(s) Oral daily  simvastatin 20 milliGRAM(s) Oral at bedtime  tamsulosin 0.4 milliGRAM(s) Oral at bedtime    MEDICATIONS  (PRN):  morphine  - Injectable 2 milliGRAM(s) IV Push every 6 hours PRN Severe Pain (7 - 10)  morphine  - Injectable 1 milliGRAM(s) IV Push every 6 hours PRN Moderate Pain (4 - 6)      Vital Signs Last 24 Hrs  T(C): 36.8 (14 Jul 2023 06:00), Max: 38.2 (13 Jul 2023 22:18)  T(F): 98.3 (14 Jul 2023 06:00), Max: 100.8 (13 Jul 2023 22:18)  HR: 91 (14 Jul 2023 06:00) (76 - 106)  BP: 101/67 (14 Jul 2023 06:00) (88/67 - 147/63)  BP(mean): --  RR: 18 (14 Jul 2023 06:00) (12 - 31)  SpO2: 92% (14 Jul 2023 06:00) (92% - 100%)    Parameters below as of 14 Jul 2023 06:00  Patient On (Oxygen Delivery Method): room air      PHYSICAL EXAM:  General: NAD, resting in bed  Neuro:  Alert & oriented x 3  HEENT: NCAT, EOMI, conjunctiva clear  Lung:  respirations nonlabored on room air, good inspiratory effort  Abdomen: soft, nontender, nondistended. Unable to palpate bladder.   Extremities: no pedal edema or calf tenderness noted     I&O's Detail      LABS:                        10.1   10.32 )-----------( 170      ( 14 Jul 2023 07:55 )             32.1     07-13    141  |  110<H>  |  25<H>  ----------------------------<  124<H>  4.6   |  27  |  1.77<H>    Ca    8.9      13 Jul 2023 08:13  Phos  3.3     07-13  Mg     2.4     07-13    TPro  7.2  /  Alb  3.0<L>  /  TBili  0.4  /  DBili  x   /  AST  13<L>  /  ALT  21  /  AlkPhos  96  07-12    PT/INR - ( 13 Jul 2023 08:13 )   PT: 14.3 sec;   INR: 1.19 ratio         PTT - ( 13 Jul 2023 08:13 )  PTT:29.0 sec  Urinalysis Basic - ( 13 Jul 2023 08:13 )    Color: x / Appearance: x / SG: x / pH: x  Gluc: 124 mg/dL / Ketone: x  / Bili: x / Urobili: x   Blood: x / Protein: x / Nitrite: x   Leuk Esterase: x / RBC: x / WBC x   Sq Epi: x / Non Sq Epi: x / Bacteria: x

## 2023-07-14 NOTE — DISCHARGE NOTE PROVIDER - CARE PROVIDER_API CALL
Simon Josue  Urology  [address] [facility], [address]  [place] [id number]  Phone: [phone]  Fax: [phone]  Follow Up Time: 2 weeks

## 2023-07-14 NOTE — DISCHARGE NOTE PROVIDER - HOSPITAL COURSE
Patient is 67 year old female PMHx obesity, HLD, hypothyroidism, with congential ureteral strictures s/p robotic pyloroplasty with stent insertion 12/2022 admitted with leukocytosis, fevers, and acute kidney injury after her ureteral stent were removed in office. She underwent bilateral metal ureteral stent placement and cystoscopy with Dr. Josue on 7/13/23, tolerated procedure well. At time of discharge, patient was hemodynamically stable, tolerating diet PO, voiding urine, was ambulating, and was comfortable with adequate pain control. Patient is 67 year old female PMHx obesity, HLD, hypothyroidism, with congential ureteral strictures s/p robotic pyloroplasty with stent insertion 12/2022 admitted with leukocytosis, fevers, and acute kidney injury after her ureteral stent were removed in office. She underwent bilateral metal ureteral stent placement and cystoscopy with Dr. Josue on 7/13/23, tolerated procedure well. At time of discharge, patient was hemodynamically stable, tolerating diet PO, voiding urine, was ambulating, and was comfortable with adequate pain control. Pt instructed to followup in office next week.

## 2023-07-14 NOTE — DISCHARGE NOTE PROVIDER - NSDCCPTREATMENT_GEN_ALL_CORE_FT
PRINCIPAL PROCEDURE  Procedure: Cystoscopy, with bilateral ureteral stent insertion  Findings and Treatment:

## 2023-07-14 NOTE — CHART NOTE - NSCHARTNOTEFT_GEN_A_CORE
Discharge held because of elevated creatinine.  Cr elevated this AM 1.77--> 1.93, given 500cc LR bolus, Cr slightly improved 1.77--> 1.93--> 1.81.   Remains on LR at 100ml/hr.     Discussed with Dr. Josue, does not want patient to be discharged at this time.  -Will give another 500cc LR bolus and 10mg IV lasix push.  -Will recheck BMP at 8pm. Discharge held because of elevated creatinine .  Cr elevated this AM 1.77--> 1.93, given 500cc LR bolus, Cr slightly improved 1.77--> 1.93--> 1.81.   Remains on LR at 100ml/hr.     Discussed with Dr. Josue, does not want patient to be discharged at this time.  -Will give another 500cc LR b  olus and 10mg IV lasix push.  -Will recheck BMP at 8pm.

## 2023-07-15 ENCOUNTER — TRANSCRIPTION ENCOUNTER (OUTPATIENT)
Age: 68
End: 2023-07-15

## 2023-07-15 VITALS
DIASTOLIC BLOOD PRESSURE: 97 MMHG | OXYGEN SATURATION: 99 % | SYSTOLIC BLOOD PRESSURE: 141 MMHG | RESPIRATION RATE: 18 BRPM | HEART RATE: 89 BPM | TEMPERATURE: 97 F

## 2023-07-15 LAB
ANION GAP SERPL CALC-SCNC: 4 MMOL/L — LOW (ref 5–17)
BUN SERPL-MCNC: 20 MG/DL — SIGNIFICANT CHANGE UP (ref 7–23)
CALCIUM SERPL-MCNC: 8.4 MG/DL — LOW (ref 8.5–10.1)
CHLORIDE SERPL-SCNC: 110 MMOL/L — HIGH (ref 96–108)
CO2 SERPL-SCNC: 27 MMOL/L — SIGNIFICANT CHANGE UP (ref 22–31)
CREAT SERPL-MCNC: 1.44 MG/DL — HIGH (ref 0.5–1.3)
CULTURE RESULTS: SIGNIFICANT CHANGE UP
EGFR: 39 ML/MIN/1.73M2 — LOW
GLUCOSE SERPL-MCNC: 124 MG/DL — HIGH (ref 70–99)
HCT VFR BLD CALC: 30 % — LOW (ref 34.5–45)
HGB BLD-MCNC: 9.5 G/DL — LOW (ref 11.5–15.5)
MCHC RBC-ENTMCNC: 29.2 PG — SIGNIFICANT CHANGE UP (ref 27–34)
MCHC RBC-ENTMCNC: 31.7 G/DL — LOW (ref 32–36)
MCV RBC AUTO: 92.3 FL — SIGNIFICANT CHANGE UP (ref 80–100)
NRBC # BLD: 0 /100 WBCS — SIGNIFICANT CHANGE UP (ref 0–0)
PLATELET # BLD AUTO: 163 K/UL — SIGNIFICANT CHANGE UP (ref 150–400)
POTASSIUM SERPL-MCNC: 3.8 MMOL/L — SIGNIFICANT CHANGE UP (ref 3.5–5.3)
POTASSIUM SERPL-SCNC: 3.8 MMOL/L — SIGNIFICANT CHANGE UP (ref 3.5–5.3)
RBC # BLD: 3.25 M/UL — LOW (ref 3.8–5.2)
RBC # FLD: 13.2 % — SIGNIFICANT CHANGE UP (ref 10.3–14.5)
SODIUM SERPL-SCNC: 141 MMOL/L — SIGNIFICANT CHANGE UP (ref 135–145)
SPECIMEN SOURCE: SIGNIFICANT CHANGE UP
WBC # BLD: 8.13 K/UL — SIGNIFICANT CHANGE UP (ref 3.8–10.5)
WBC # FLD AUTO: 8.13 K/UL — SIGNIFICANT CHANGE UP (ref 3.8–10.5)

## 2023-07-15 RX ORDER — CEFDINIR 250 MG/5ML
1 POWDER, FOR SUSPENSION ORAL
Qty: 6 | Refills: 0
Start: 2023-07-15 | End: 2023-07-17

## 2023-07-15 RX ADMIN — Medication 650 MILLIGRAM(S): at 12:16

## 2023-07-15 RX ADMIN — Medication 650 MILLIGRAM(S): at 00:12

## 2023-07-15 RX ADMIN — SODIUM CHLORIDE 100 MILLILITER(S): 9 INJECTION, SOLUTION INTRAVENOUS at 03:17

## 2023-07-15 RX ADMIN — Medication 650 MILLIGRAM(S): at 06:47

## 2023-07-15 RX ADMIN — Medication 650 MILLIGRAM(S): at 01:12

## 2023-07-15 RX ADMIN — Medication 650 MILLIGRAM(S): at 05:47

## 2023-07-15 RX ADMIN — Medication 175 MICROGRAM(S): at 05:47

## 2023-07-15 NOTE — PROGRESS NOTE ADULT - SUBJECTIVE AND OBJECTIVE BOX
Postoperative Day #: 2  Patient seen and examined bedside with Joselo Cortez, no acute issues, pt resting comfortably. Renal function improved today. Pt eager to go home.   No complaints offered,  denies flank pain, burning with urination, fever, chills.   Denies nausea and vomiting. Tolerating diet.  Denies chest pain, dyspnea, cough.    T(F): 97.3 (07-15-23 @ 11:50), Max: 100.4 (07-14-23 @ 16:14)  HR: 89 (07-15-23 @ 11:50) (82 - 106)  BP: 141/97 (07-15-23 @ 11:50) (106/65 - 141/97)  RR: 18 (07-15-23 @ 11:50) (18 - 18)  SpO2: 99% (07-15-23 @ 11:50) (95% - 99%)  Wt(kg): --  CAPILLARY BLOOD GLUCOSE          PHYSICAL EXAM:  PHYSICAL EXAM:  General: NAD, resting in bed  Neuro:  Alert & oriented x 3  HEENT: NCAT, EOMI, conjunctiva clear  Lung:  respirations nonlabored on room air, good inspiratory effort  Abdomen: soft, nontender, nondistended, no CVA tenderness b/l  : no bladder distention or tenderness  Extremities: no pedal edema or calf tenderness noted     LABS:                        9.5    8.13  )-----------( 163      ( 15 Jul 2023 06:23 )             30.0     07-15    141  |  110<H>  |  20  ----------------------------<  124<H>  3.8   |  27  |  1.44<H>    Ca    8.4<L>      15 Jul 2023 06:23    TPro  6.4  /  Alb  2.3<L>  /  TBili  0.3  /  DBili  x   /  AST  20  /  ALT  21  /  AlkPhos  96  07-14      I&O's Detail    14 Jul 2023 07:01  -  15 Jul 2023 07:00  --------------------------------------------------------  IN:    Lactated Ringers: 1200 mL  Total IN: 1200 mL    OUT:    Voided (mL): 3050 mL  Total OUT: 3050 mL    Total NET: -1850 mL      15 Jul 2023 07:01  -  15 Jul 2023 13:22  --------------------------------------------------------  IN:    Oral Fluid: 660 mL  Total IN: 660 mL    OUT:    Voided (mL): 900 mL  Total OUT: 900 mL    Total NET: -240 mL        Culture Results:   <10,000 CFU/mL Normal Urogenital Maria E (07-12 @ 17:45)  Culture Results:   No growth at 48 Hours (07-12 @ 15:40)  Culture Results:   No growth at 48 Hours (07-12 @ 15:40)      67female PMHx obesity, HLD, hypothyroidism, with congential UPJ Obstruction, s/p robotic pyloroplasty  with stent insertion 12/2022. Developed hydronephrosis.  now POD 2 s/p cystoscopy and bilateral ureteral metal stent placement, creatnine improving    - d/c home later today  - flomax  - pyridium PRN for urinary discomfort   - cont home medications as prescribed  - f/u with Dr. Josue in 1 week

## 2023-07-15 NOTE — PROGRESS NOTE ADULT - NS ATTEND AMEND GEN_ALL_CORE FT
Bilateral metal stent placement.  The patient is in stable conditions
I agree with the statements above
I agree with the statements above
I agree with the statements above.

## 2023-07-15 NOTE — DISCHARGE NOTE NURSING/CASE MANAGEMENT/SOCIAL WORK - NSDCPNINST_GEN_ALL_CORE
Pt verbalized understanding of discharge instructions, medications and scheduling follow up appointments with MD.

## 2023-07-15 NOTE — DISCHARGE NOTE NURSING/CASE MANAGEMENT/SOCIAL WORK - NSDCVIVACCINE_GEN_ALL_CORE_FT
Tdap; 14-Dec-2020 10:14; Kaylin Dumont (RN); [employer]; [id number] (Exp. Date: 21-Jul-2022); IntraMuscular; Deltoid Left.; 0.5 milliLiter(s); VIS (VIS Published: 09-May-2013, VIS Presented: 14-Dec-2020);

## 2023-07-15 NOTE — DISCHARGE NOTE NURSING/CASE MANAGEMENT/SOCIAL WORK - PATIENT PORTAL LINK FT
You can access the FollowMyHealth Patient Portal offered by [facility] by registering at the following website: [email or web address]. By joining [employer]’s FollowMyHealth portal, you will also be able to view your health information using other applications (apps) compatible with our system.

## 2023-07-18 ENCOUNTER — NON-APPOINTMENT (OUTPATIENT)
Age: 68
End: 2023-07-18

## 2023-07-25 ENCOUNTER — APPOINTMENT (OUTPATIENT)
Dept: UROLOGY | Facility: CLINIC | Age: 68
End: 2023-07-25
Payer: MEDICARE

## 2023-07-25 VITALS
TEMPERATURE: 97.3 F | OXYGEN SATURATION: 96 % | DIASTOLIC BLOOD PRESSURE: 68 MMHG | SYSTOLIC BLOOD PRESSURE: 141 MMHG | HEART RATE: 71 BPM

## 2023-07-25 PROCEDURE — 99213 OFFICE O/P EST LOW 20 MIN: CPT

## 2023-07-25 NOTE — HISTORY OF PRESENT ILLNESS
[FreeTextEntry1] : 66 y/o female post felt robot-assisted pyeloplasty.\par After 1 month stent removed and hydronephrosis was detected.\par Stent placed again, 7Fr, on 01/22. Referred no more pain\par Sent removed 7/7/23.\par Today c/o mild left flank pain.\par On last renal panel, creatinine was 1.8 (Jul 11)\par Bilateral metal stents are positioned on Jul 13.\par \par \par \par \par

## 2023-07-25 NOTE — ASSESSMENT
[FreeTextEntry1] : 66 y/o female post felt robot-assisted pyeloplasty.\par After 1 month stent removed and hydronephrosis was detected.\par Stent placed again, 7Fr, on 01/22. Referred no more pain\par Sent removed 7/7/23.\par Today c/o mild left flank pain.\par On last renal panel, creatinine was 1.8 (Jul 11)\par Bilateral metal stents are positioned on Jul 13.\par \par Blood draw performed for renal panel.\par Will F/U for results\par \par In case negative, F/U in 1 year for stent replacement.\par \par \par \par

## 2023-08-02 LAB
ALBUMIN SERPL ELPH-MCNC: 3.6 G/DL
ANION GAP SERPL CALC-SCNC: 11 MMOL/L
BUN SERPL-MCNC: 22 MG/DL
CALCIUM SERPL-MCNC: 9.8 MG/DL
CHLORIDE SERPL-SCNC: 103 MMOL/L
CO2 SERPL-SCNC: 24 MMOL/L
CREAT SERPL-MCNC: 1.14 MG/DL
EGFR: 52 ML/MIN/1.73M2
GLUCOSE SERPL-MCNC: 107 MG/DL
PHOSPHATE SERPL-MCNC: 3.6 MG/DL
POTASSIUM SERPL-SCNC: 5.3 MMOL/L
SODIUM SERPL-SCNC: 138 MMOL/L

## 2024-01-16 NOTE — PATIENT PROFILE ADULT - NSPRESCRALCFREQ_GEN_A_NUR
Pt ambulatory to ED from home c/o abdominal pain near ostomy pouch. Per pt he was recently d/charla from  w/ rectal fissures and ostomy bag. Pt endorses no output from pouch since 5am. Endorses nausea/ diarrhea from rectum. Allergies to cirpo.
Monthly or less

## 2024-03-01 ENCOUNTER — APPOINTMENT (OUTPATIENT)
Dept: UROLOGY | Facility: CLINIC | Age: 69
End: 2024-03-01
Payer: MEDICARE

## 2024-03-01 VITALS
SYSTOLIC BLOOD PRESSURE: 176 MMHG | TEMPERATURE: 97.8 F | OXYGEN SATURATION: 96 % | HEART RATE: 74 BPM | DIASTOLIC BLOOD PRESSURE: 78 MMHG

## 2024-03-01 DIAGNOSIS — Q62.39 OTHER OBSTRUCTIVE DEFECTS OF RENAL PELVIS AND URETER: ICD-10-CM

## 2024-03-01 PROCEDURE — 99213 OFFICE O/P EST LOW 20 MIN: CPT

## 2024-03-01 NOTE — HISTORY OF PRESENT ILLNESS
[FreeTextEntry1] : 66 y/o female post left robot-assisted pyeloplasty. After 1 month stent removed and hydronephrosis was detected. Stent placed again, 7Fr, on 01/22. Referred no more pain Sent removed 7/7/23. Bilateral metal stents are positioned on Jul 13 for flank pain. Comes to office for F/U check. No pain reported CARLOS.

## 2024-03-01 NOTE — PHYSICAL EXAM
[General Appearance - Well Developed] : well developed [General Appearance - Well Nourished] : well nourished [Normal Appearance] : normal appearance [Well Groomed] : well groomed [General Appearance - In No Acute Distress] : no acute distress [Abdomen Soft] : soft [Abdomen Tenderness] : non-tender [Costovertebral Angle Tenderness] : no ~M costovertebral angle tenderness [Urinary Bladder Findings] : the bladder was normal on palpation [Edema] : no peripheral edema [Respiration, Rhythm And Depth] : normal respiratory rhythm and effort [] : no respiratory distress [Exaggerated Use Of Accessory Muscles For Inspiration] : no accessory muscle use [Affect] : the affect was normal [Oriented To Time, Place, And Person] : oriented to person, place, and time [Mood] : the mood was normal [Not Anxious] : not anxious [No Focal Deficits] : no focal deficits [Normal Station and Gait] : the gait and station were normal for the patient's age [No Palpable Adenopathy] : no palpable adenopathy

## 2024-03-01 NOTE — ASSESSMENT
[FreeTextEntry1] : 66 y/o female post left robot-assisted pyeloplasty. After 1 month stent removed and hydronephrosis was detected. Stent placed again, 7Fr, on 01/22. Referred no more pain Sent removed 7/7/23. Bilateral metal stents are positioned on Jul 13 for flank pain. Comes to office for F/U check. No pain reported CARLOS.  Blood draw performed for renal panel. Collecting urine for UA and Culture   In case negative, F/U in July for stent removal.

## 2024-03-02 LAB
ALBUMIN SERPL ELPH-MCNC: 4.2 G/DL
ANION GAP SERPL CALC-SCNC: 10 MMOL/L
APPEARANCE: CLEAR
BACTERIA: NEGATIVE /HPF
BILIRUBIN URINE: NEGATIVE
BLOOD URINE: NEGATIVE
BUN SERPL-MCNC: 30 MG/DL
CALCIUM SERPL-MCNC: 9.6 MG/DL
CAST: 1 /LPF
CHLORIDE SERPL-SCNC: 107 MMOL/L
CO2 SERPL-SCNC: 25 MMOL/L
COLOR: YELLOW
CREAT SERPL-MCNC: 1.2 MG/DL
EGFR: 49 ML/MIN/1.73M2
EPITHELIAL CELLS: 6 /HPF
GLUCOSE QUALITATIVE U: NEGATIVE MG/DL
GLUCOSE SERPL-MCNC: 115 MG/DL
KETONES URINE: NEGATIVE MG/DL
LEUKOCYTE ESTERASE URINE: ABNORMAL
MICROSCOPIC-UA: NORMAL
NITRITE URINE: NEGATIVE
PH URINE: 6
PHOSPHATE SERPL-MCNC: 4 MG/DL
POTASSIUM SERPL-SCNC: 5.2 MMOL/L
PROTEIN URINE: NEGATIVE MG/DL
RED BLOOD CELLS URINE: 1 /HPF
SODIUM SERPL-SCNC: 143 MMOL/L
SPECIFIC GRAVITY URINE: 1.01
UROBILINOGEN URINE: 0.2 MG/DL
WHITE BLOOD CELLS URINE: 7 /HPF

## 2024-03-03 LAB — BACTERIA UR CULT: NORMAL

## 2024-04-15 NOTE — PRE-OP CHECKLIST - ALLERGIES REVIEWED
Pt comes in today for nurse visit BP check. Medications were reviewed. Pt is not currently on blood pressure medication but is taking all other medications as prescribed.      Manual BP check on left and patient's home BP cuff on the right.      Pt also brought in blood pressure readings from home today for review.     Pt updated that message would be routed to Hanna Monroe NP to review and advise.   done

## 2024-06-24 NOTE — ED ADULT TRIAGE NOTE - BP NONINVASIVE DIASTOLIC (MM HG)
Received request via: Pharmacy    Was the patient seen in the last year in this department? Yes  LOV : 3/14/2024   Does the patient have an active prescription (recently filled or refills available) for medication(s) requested? No    Ph[facility] Name: [facility]     Does the patient have correction Plus and need 100 day supply (blood pressure, diabetes and cholesterol meds only)? Patient does not have SCP  
82

## 2024-06-26 NOTE — PATIENT PROFILE ADULT - PASTORAL.
At 0916, Joanna Engel CNP was in to assess patient and to discuss plan of care.   chaplaincy/clergy/

## 2024-07-29 ENCOUNTER — APPOINTMENT (OUTPATIENT)
Dept: UROLOGY | Facility: CLINIC | Age: 69
End: 2024-07-29
Payer: MEDICARE

## 2024-07-29 DIAGNOSIS — Q62.39 OTHER OBSTRUCTIVE DEFECTS OF RENAL PELVIS AND URETER: ICD-10-CM

## 2024-07-29 PROCEDURE — 99213 OFFICE O/P EST LOW 20 MIN: CPT

## 2024-07-29 NOTE — ASSESSMENT
[FreeTextEntry1] : 68 y/o female post left robot-assisted pyeloplasty. After 1 month stent removed and hydronephrosis was detected. Stent placed again, 7Fr, on 01/22. Referred no more pain Sent removed 7/7/23. Bilateral metal stents are positioned on Jul 13 for flank pain. Comes to office for F/U check. No pain reported CARLOS.  Blood draw performed for renal panel. Collecting urine for UA and Culture   In case negative, F/U in July for stent removal.

## 2024-07-29 NOTE — HISTORY OF PRESENT ILLNESS
[FreeTextEntry1] : 69 y/o female post left robot-assisted pyeloplasty. After 1 month stent removed and hydronephrosis was detected. Stent placed again, 7Fr, on 01/22. Referred no more pain  10/13/2022 - NM renal IMPRESSION: Abnormal diuretic renal scan. Obstructed right kidney with markedly diminished flow and function. Adequate flow to and mildly diminished function of the left kidney with equivocal response to diuretic challenge. Low-grade or partial obstruction is not excluded. Differential renal function: Right kidney 25 %; left kidney 75 %.  Sent removed 7/7/23.  07/09/2024 - creat: 1.84 07/10/2023 - US renal Findings: The right and left kidney demonstrate moderate hydronephrosis. there is a 5 mm echogenic focus in the lower pole of the right kidney with distal shadowing. Both kidneys are normal in size and echogenicity without solid masses visualized.  7/13/2023 - Bilateral metal stents are positioned 03/02/2024 - creat: 1.20 Comes to plan stent replacement.  Scheduling the patient for a cystoscopy with bilateral metallic ureteral stent replacement.  Pre-operative clearance from her primary care physician and a cardiologist is required.  The procedure will be performed under general anesthesia, and the patient will be discharged home the same day.

## 2024-09-11 ENCOUNTER — OUTPATIENT (OUTPATIENT)
Dept: OUTPATIENT SERVICES | Facility: HOSPITAL | Age: 69
LOS: 1 days | Discharge: HOME | End: 2024-09-11
Payer: MEDICARE

## 2024-09-11 VITALS
DIASTOLIC BLOOD PRESSURE: 56 MMHG | HEIGHT: 60 IN | HEART RATE: 80 BPM | SYSTOLIC BLOOD PRESSURE: 129 MMHG | TEMPERATURE: 98 F | WEIGHT: 211.86 LBS | OXYGEN SATURATION: 96 % | RESPIRATION RATE: 17 BRPM

## 2024-09-11 DIAGNOSIS — Z98.89 OTHER SPECIFIED POSTPROCEDURAL STATES: Chronic | ICD-10-CM

## 2024-09-11 DIAGNOSIS — Z01.818 ENCOUNTER FOR OTHER PREPROCEDURAL EXAMINATION: ICD-10-CM

## 2024-09-11 DIAGNOSIS — Z96.0 PRESENCE OF UROGENITAL IMPLANTS: Chronic | ICD-10-CM

## 2024-09-11 DIAGNOSIS — Z90.49 ACQUIRED ABSENCE OF OTHER SPECIFIED PARTS OF DIGESTIVE TRACT: Chronic | ICD-10-CM

## 2024-09-11 DIAGNOSIS — Z96.659 PRESENCE OF UNSPECIFIED ARTIFICIAL KNEE JOINT: Chronic | ICD-10-CM

## 2024-09-11 DIAGNOSIS — Q62.39 OTHER OBSTRUCTIVE DEFECTS OF RENAL PELVIS AND URETER: ICD-10-CM

## 2024-09-11 DIAGNOSIS — H26.9 UNSPECIFIED CATARACT: Chronic | ICD-10-CM

## 2024-09-11 DIAGNOSIS — Z98.890 OTHER SPECIFIED POSTPROCEDURAL STATES: Chronic | ICD-10-CM

## 2024-09-11 LAB
ANION GAP SERPL CALC-SCNC: 5 MMOL/L — SIGNIFICANT CHANGE UP (ref 5–17)
APPEARANCE UR: ABNORMAL
BACTERIA # UR AUTO: ABNORMAL /HPF
BILIRUB UR-MCNC: NEGATIVE — SIGNIFICANT CHANGE UP
BUN SERPL-MCNC: 39 MG/DL — HIGH (ref 7–23)
CALCIUM SERPL-MCNC: 9.3 MG/DL — SIGNIFICANT CHANGE UP (ref 8.5–10.1)
CHLORIDE SERPL-SCNC: 113 MMOL/L — HIGH (ref 96–108)
CO2 SERPL-SCNC: 22 MMOL/L — SIGNIFICANT CHANGE UP (ref 22–31)
COLOR SPEC: YELLOW — SIGNIFICANT CHANGE UP
CREAT SERPL-MCNC: 2.07 MG/DL — HIGH (ref 0.5–1.3)
DIFF PNL FLD: ABNORMAL
EGFR: 25 ML/MIN/1.73M2 — LOW
EPI CELLS # UR: PRESENT
GLUCOSE SERPL-MCNC: 108 MG/DL — HIGH (ref 70–99)
GLUCOSE UR QL: NEGATIVE MG/DL — SIGNIFICANT CHANGE UP
HCT VFR BLD CALC: 33.2 % — LOW (ref 34.5–45)
HGB BLD-MCNC: 10.3 G/DL — LOW (ref 11.5–15.5)
KETONES UR-MCNC: NEGATIVE MG/DL — SIGNIFICANT CHANGE UP
LEUKOCYTE ESTERASE UR-ACNC: ABNORMAL
MCHC RBC-ENTMCNC: 28.9 PG — SIGNIFICANT CHANGE UP (ref 27–34)
MCHC RBC-ENTMCNC: 31 G/DL — LOW (ref 32–36)
MCV RBC AUTO: 93.3 FL — SIGNIFICANT CHANGE UP (ref 80–100)
NITRITE UR-MCNC: NEGATIVE — SIGNIFICANT CHANGE UP
NRBC # BLD: 0 /100 WBCS — SIGNIFICANT CHANGE UP (ref 0–0)
PH UR: 6 — SIGNIFICANT CHANGE UP (ref 5–8)
PLATELET # BLD AUTO: 256 K/UL — SIGNIFICANT CHANGE UP (ref 150–400)
POTASSIUM SERPL-MCNC: 4.8 MMOL/L — SIGNIFICANT CHANGE UP (ref 3.5–5.3)
POTASSIUM SERPL-SCNC: 4.8 MMOL/L — SIGNIFICANT CHANGE UP (ref 3.5–5.3)
PROT UR-MCNC: 100 MG/DL
RBC # BLD: 3.56 M/UL — LOW (ref 3.8–5.2)
RBC # FLD: 14 % — SIGNIFICANT CHANGE UP (ref 10.3–14.5)
RBC CASTS # UR COMP ASSIST: 5 /HPF — HIGH (ref 0–4)
SODIUM SERPL-SCNC: 140 MMOL/L — SIGNIFICANT CHANGE UP (ref 135–145)
SP GR SPEC: 1.01 — SIGNIFICANT CHANGE UP (ref 1–1.03)
UROBILINOGEN FLD QL: 0.2 MG/DL — SIGNIFICANT CHANGE UP (ref 0.2–1)
WBC # BLD: 9.99 K/UL — SIGNIFICANT CHANGE UP (ref 3.8–10.5)
WBC # FLD AUTO: 9.99 K/UL — SIGNIFICANT CHANGE UP (ref 3.8–10.5)
WBC UR QL: ABNORMAL /HPF (ref 0–5)

## 2024-09-11 PROCEDURE — 93010 ELECTROCARDIOGRAM REPORT: CPT

## 2024-09-11 NOTE — H&P PST ADULT - DIAGNOSTICS OTHER REVIEWED
LACTATION CONSULT NOTE      Jacinta Samuel is a 35 year old  female at 39w0d    [id number]  : 1988   35 year old     39w0d    Problem List:  Patient Active Problem List   Diagnosis    Supervision of high risk pregnancy, antepartum    Hypertension    Antepartum multigravida of advanced maternal age    Anxiety    Previous recurrent miscarriages affecting pregnancy, antepartum    Conceived by in vitro fertilization    Gestational diabetes    Iron deficiency anemia during pregnancy    MTHFR mutation    Breech presentation, antepartum, fetus 1         History:   Primary Medical:    Past Medical History:   Diagnosis Date    Antepartum multigravida of advanced maternal age 2023    Anxiety 2023    Anxiety disorder     Encounter for supervision of pregnancy resulting from assisted reproductive technology 2023    History of 2019 novel coronavirus disease (COVID-19) 2022    History of recurrent miscarriages, not currently pregnant 2022    Hypertension 2023    Positive serum cytomegalovirus (CMV) IgM antibody 2023    Previous recurrent miscarriages affecting pregnancy, antepartum 2023     Primary Surgical:    Past Surgical History:   Procedure Laterality Date    Dilation and curettage of uterus  10/18/2022    Hysteroscopy  2022    Polypectomy  2022       SOCIAL HISTORY:   Social:    Social History     Tobacco Use    Smoking status: Never     Passive exposure: Never    Smokeless tobacco: Never   Substance Use Topics    Alcohol use: Not Currently     Sexual:    Sexually Active: Yes              Drug:    Drug Use:    Never           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                 Current Facility-Administered Medications   Medication    labetalol (NORMODYNE) injection 20 mg    labetalol (NORMODYNE) injection 40 mg    labetalol (NORMODYNE) injection 80 mg    hydrALAZINE (APRESOLINE) injection 10 mg    ondansetron (ZOFRAN)  injection 4 mg    oxyTOCIN (PITOCIN) injection 10 Units    oxyTOCIN (PITOCIN) 30 Units in sodium chloride 0.9% 500 mL    acetaminophen (TYLENOL) tablet 650 mg    [Held by provider] ibuprofen (MOTRIN) tablet 600 mg    measles-mumps-rubella vaccine 0.5 mL    varicella virus (VARIVAX) live vaccine 0.5 mL    diphtheria-pertussis (acellular)-tetanus (BOOSTRIX) vaccine 0.5 mL    ferrous sulfate (65 mg Fe per 325 mg) tablet 325 mg    hydroCORTisone-pramoxine (ANALPRAM-HC) 2.5-1 % rectal cream    simethicone (MYLICON) tablet 125 mg    calcium carbonate (TUMS) chewable tablet 500 mg    polyethylene glycol (MIRALAX) packet 17 g    enoxaparin (LOVENOX) injection 40 mg    labetalol (NORMODYNE) tablet 300 mg    metoCLOPramide (REGLAN) injection 5 mg    lactated ringers infusion    ketorolac (TORADOL) injection 15 mg     Facility-Administered Medications Ordered in Other Encounters   Medication    lactated ringers infusion    PHENYLephrine (FREIDA-SYNEPHRINE) 3 mg in sodium chloride 0.9 % 30 mL infusion    oxyTOCIN (PITOCIN) 30 Units in sodium chloride 0.9 % 500 mL infusion    ondansetron (ZOFRAN) injection       Route of delivery: , Low Transverse [251] .    Inexperienced breastfeeding mother of term, AGA infant on glucose protocol for maternal GDM.   Discussed benefits of breastfeeding on mother's health and decreased risks for various diseases, frequent skin to skin, feeding on cue, cues to watch for, and waking techniques. Feeding frequency, and duration also reviewed.   stomach size and satiety discussed along with supply/demand principles of milk production.  Mother encouraged to feed baby on cue or within 2-3 hours if cues not shown, and feed unrestricted offering both breasts at each feeding. Normal  behavior during first 24 and 48 hours discussed.  Provided anticipatory guidance for cluster feeding and weight loss.  Mother assisted with football positioning, infant awakened for feeding, brought to  breast and latched with ease.  Reassuring latch signs pointed out for mother to duplicate at each feeding.  Mother advised to allow baby unrestricted time at breast, offering both sides each feeding. Ample time given for answering questions including those regarding when/how to begin pumping for storage. Education given given covering signs of good vs poor latch, importance of colostrum, risks of not breastfeeding, and how to ensure baby is being fed/getting enough. Contact info for LC left on whiteboard.  Phone placed within reach.  Mother to call for assistance as needed.   No

## 2024-09-11 NOTE — H&P PST ADULT - NSICDXPASTSURGICALHX_GEN_ALL_CORE_FT
PAST SURGICAL HISTORY:  Cataract b/l surgery    H/O total knee replacement b/l    History of pyloroplasty     S/P  section     S/P laparoscopic cholecystectomy     S/P lumbar laminectomy     S/P ureteral stent placement

## 2024-09-11 NOTE — H&P PST ADULT - NSANTHOSAYNRD_GEN_A_CORE
was tested/No. JESSIKA screening performed.  STOP BANG Legend: 0-2 = LOW Risk; 3-4 = INTERMEDIATE Risk; 5-8 = HIGH Risk

## 2024-09-11 NOTE — H&P PST ADULT - HISTORY OF PRESENT ILLNESS
67 y/o female, PMH of Hypothyroidism and HLD, with c/o of difficulty passing urine, about 3 yrs ago, had bilateral stent placed twice in the past, last was done in July 2023, now to replace ureteral stents.

## 2024-09-11 NOTE — H&P PST ADULT - GENERAL
July 24, 2024     Patient: Kaiser Grier   YOB: 1966   Date of Visit: 7/24/2024       To Whom It May Concern:    It is my medical opinion that Kaiser Grier has chronic pain that is currently not being fully managed using prescriptive medications. I feel as Mr. Grier could benefit from alternate therapies that may aide in improving his pain.          Sincerely,        LAUREEN Cook    CC: No Recipients   negative

## 2024-09-12 LAB
CULTURE RESULTS: SIGNIFICANT CHANGE UP
SPECIMEN SOURCE: SIGNIFICANT CHANGE UP

## 2024-09-24 ENCOUNTER — TRANSCRIPTION ENCOUNTER (OUTPATIENT)
Age: 69
End: 2024-09-24

## 2024-09-25 ENCOUNTER — TRANSCRIPTION ENCOUNTER (OUTPATIENT)
Age: 69
End: 2024-09-25

## 2024-09-25 ENCOUNTER — APPOINTMENT (OUTPATIENT)
Dept: UROLOGY | Facility: HOSPITAL | Age: 69
End: 2024-09-25

## 2024-09-25 ENCOUNTER — OUTPATIENT (OUTPATIENT)
Dept: OUTPATIENT SERVICES | Facility: HOSPITAL | Age: 69
LOS: 1 days | Discharge: HOME | End: 2024-09-25
Payer: MEDICARE

## 2024-09-25 VITALS
RESPIRATION RATE: 17 BRPM | TEMPERATURE: 99 F | HEART RATE: 79 BPM | SYSTOLIC BLOOD PRESSURE: 135 MMHG | OXYGEN SATURATION: 97 % | HEIGHT: 60 IN | DIASTOLIC BLOOD PRESSURE: 76 MMHG | WEIGHT: 210.1 LBS

## 2024-09-25 VITALS
HEART RATE: 69 BPM | TEMPERATURE: 98 F | SYSTOLIC BLOOD PRESSURE: 134 MMHG | RESPIRATION RATE: 17 BRPM | OXYGEN SATURATION: 97 % | DIASTOLIC BLOOD PRESSURE: 67 MMHG

## 2024-09-25 DIAGNOSIS — Z98.89 OTHER SPECIFIED POSTPROCEDURAL STATES: Chronic | ICD-10-CM

## 2024-09-25 DIAGNOSIS — Z96.0 PRESENCE OF UROGENITAL IMPLANTS: Chronic | ICD-10-CM

## 2024-09-25 DIAGNOSIS — Z90.49 ACQUIRED ABSENCE OF OTHER SPECIFIED PARTS OF DIGESTIVE TRACT: Chronic | ICD-10-CM

## 2024-09-25 DIAGNOSIS — Z96.659 PRESENCE OF UNSPECIFIED ARTIFICIAL KNEE JOINT: Chronic | ICD-10-CM

## 2024-09-25 DIAGNOSIS — H26.9 UNSPECIFIED CATARACT: Chronic | ICD-10-CM

## 2024-09-25 DIAGNOSIS — Z98.890 OTHER SPECIFIED POSTPROCEDURAL STATES: Chronic | ICD-10-CM

## 2024-09-25 PROCEDURE — 50385 CHANGE STENT VIA TRANSURETH: CPT | Mod: 50

## 2024-09-25 DEVICE — IMPLANTABLE DEVICE: Type: IMPLANTABLE DEVICE | Site: LEFT, RIGHT | Status: FUNCTIONAL

## 2024-09-25 DEVICE — STONE BASKET ZEROTIP NITINOL 4-WIRE 1.9FR 120CM X 12MM: Type: IMPLANTABLE DEVICE | Site: LEFT, RIGHT | Status: FUNCTIONAL

## 2024-09-25 DEVICE — LASER FIBER SOLTIVE 200: Type: IMPLANTABLE DEVICE | Site: LEFT, RIGHT | Status: FUNCTIONAL

## 2024-09-25 DEVICE — STONE BASKET ESCAPE NITINOL 4-WIRE 1.9FR X 90CM: Type: IMPLANTABLE DEVICE | Site: LEFT, RIGHT | Status: FUNCTIONAL

## 2024-09-25 DEVICE — STENT URET LUBRIFLX 6.0X24CM OPEN TIP: Type: IMPLANTABLE DEVICE | Site: LEFT, RIGHT | Status: FUNCTIONAL

## 2024-09-25 DEVICE — STENT URET LUBRIFLX 6.0X26CM OPEN TIP: Type: IMPLANTABLE DEVICE | Site: LEFT, RIGHT | Status: FUNCTIONAL

## 2024-09-25 DEVICE — URETERAL CATH OPEN END FLEXI-TIP 5FR .038" X 70CM: Type: IMPLANTABLE DEVICE | Site: LEFT, RIGHT | Status: FUNCTIONAL

## 2024-09-25 DEVICE — LASER FIBER SOLTIVE 365: Type: IMPLANTABLE DEVICE | Site: LEFT, RIGHT | Status: FUNCTIONAL

## 2024-09-25 DEVICE — GUIDEWIRE SENSOR DUAL-FLEX NITINOL ANGLED .035" X 150CM: Type: IMPLANTABLE DEVICE | Site: LEFT, RIGHT | Status: FUNCTIONAL

## 2024-09-25 DEVICE — STENT URET LUBRIFLX 6X28CM OPEN TIP: Type: IMPLANTABLE DEVICE | Site: LEFT, RIGHT | Status: FUNCTIONAL

## 2024-09-25 DEVICE — LASER FIBER SOLTIVE 550: Type: IMPLANTABLE DEVICE | Site: LEFT, RIGHT | Status: FUNCTIONAL

## 2024-09-25 RX ORDER — HYDROMORPHONE HYDROCHLORIDE 1 MG/ML
0.5 INJECTION, SOLUTION INTRAMUSCULAR; INTRAVENOUS; SUBCUTANEOUS
Refills: 0 | Status: DISCONTINUED | OUTPATIENT
Start: 2024-09-25 | End: 2024-09-25

## 2024-09-25 RX ORDER — ONDANSETRON HCL/PF 4 MG/2 ML
4 VIAL (ML) INJECTION ONCE
Refills: 0 | Status: DISCONTINUED | OUTPATIENT
Start: 2024-09-25 | End: 2024-09-25

## 2024-09-25 RX ORDER — SODIUM CHLORIDE 0.9 % (FLUSH) 0.9 %
3 SYRINGE (ML) INJECTION EVERY 8 HOURS
Refills: 0 | Status: DISCONTINUED | OUTPATIENT
Start: 2024-09-25 | End: 2024-09-25

## 2024-09-25 RX ORDER — SODIUM CHLORIDE IRRIG SOLUTION 0.9 %
1000 SOLUTION, IRRIGATION IRRIGATION
Refills: 0 | Status: DISCONTINUED | OUTPATIENT
Start: 2024-09-25 | End: 2024-09-25

## 2024-09-25 RX ORDER — FENTANYL CITRATE-0.9 % NACL/PF 300MCG/30
25 PATIENT CONTROLLED ANALGESIA VIAL INJECTION
Refills: 0 | Status: DISCONTINUED | OUTPATIENT
Start: 2024-09-25 | End: 2024-09-25

## 2024-09-25 NOTE — ASU PATIENT PROFILE, ADULT - FALL HARM RISK - RISK INTERVENTIONS

## 2024-09-25 NOTE — ASU PREOP CHECKLIST - HAND OFF
Triage    Pt to ED via EMS with c/o low o2 sat down to 89%.  [facility] states they placed pt on 3 L with improvement to 99% and 100%.  Pt denies chest pain or current difficultybreathing.    Pt placed on full cardiac monitor  In no apparent respiratory distress  Pt oriented x2, confused regarding time.     COVID+  Per hx pt is on 3L O2 at baseline; EMS states pt was on room air prior to their arrival.   
Unit RN to OR RN

## 2024-09-25 NOTE — BRIEF OPERATIVE NOTE - NSICDXBRIEFPREOP_GEN_ALL_CORE_FT
PRE-OP DIAGNOSIS:  Congenital stricture of ureteropelvic junction (UPJ) 25-Sep-2024 13:23:24  Simon Josue

## 2024-09-25 NOTE — ASU DISCHARGE PLAN (ADULT/PEDIATRIC) - CARE PROVIDER_API CALL
Simon Josue  Urology  [address] [facility]  [place] [id number]  Phone: [phone]  Fax: [phone]  Follow Up Time: 2 weeks

## 2024-09-25 NOTE — ASU DISCHARGE PLAN (ADULT/PEDIATRIC) - NS MD DC FALL RISK RISK
For information on Fall & Injury Prevention, visit: [email or web address] OR  [email or web address] OR  https://www.cdc.gov/steadi/patient.html

## 2024-09-25 NOTE — ASU PREOP CHECKLIST - HEART RATE (BEATS/MIN)
Spoke with patient and she was informed of the results below. She verbalized understanding and had no further questions.   79

## 2024-09-25 NOTE — ASU DISCHARGE PLAN (ADULT/PEDIATRIC) - ASU DC SPECIAL INSTRUCTIONSFT
Pt A&0X4
pt afebrile; A&0X4
Please take antibiotic as prescribed, you may take tylenol as needed for pain.

## 2024-09-25 NOTE — BRIEF OPERATIVE NOTE - NSICDXBRIEFPOSTOP_GEN_ALL_CORE_FT
POST-OP DIAGNOSIS:  Congenital stricture of ureteropelvic junction (UPJ) 25-Sep-2024 13:23:39  Simon Josue

## 2024-09-26 LAB
CULTURE RESULTS: NO GROWTH — SIGNIFICANT CHANGE UP
SPECIMEN SOURCE: SIGNIFICANT CHANGE UP

## 2024-09-27 DIAGNOSIS — M19.90 UNSPECIFIED OSTEOARTHRITIS, UNSPECIFIED SITE: ICD-10-CM

## 2024-09-27 DIAGNOSIS — E66.01 MORBID (SEVERE) OBESITY DUE TO EXCESS CALORIES: ICD-10-CM

## 2024-09-27 DIAGNOSIS — Z98.1 ARTHRODESIS STATUS: ICD-10-CM

## 2024-09-27 DIAGNOSIS — E03.9 HYPOTHYROIDISM, UNSPECIFIED: ICD-10-CM

## 2024-09-27 DIAGNOSIS — Q62.11 CONGENITAL OCCLUSION OF URETEROPELVIC JUNCTION: ICD-10-CM

## 2024-09-27 DIAGNOSIS — Z96.653 PRESENCE OF ARTIFICIAL KNEE JOINT, BILATERAL: ICD-10-CM

## 2024-09-27 DIAGNOSIS — E78.5 HYPERLIPIDEMIA, UNSPECIFIED: ICD-10-CM

## 2024-09-27 DIAGNOSIS — F32.A DEPRESSION, UNSPECIFIED: ICD-10-CM

## 2024-09-27 DIAGNOSIS — J45.909 UNSPECIFIED ASTHMA, UNCOMPLICATED: ICD-10-CM

## 2024-09-27 DIAGNOSIS — E11.9 TYPE 2 DIABETES MELLITUS WITHOUT COMPLICATIONS: ICD-10-CM

## 2024-10-08 ENCOUNTER — APPOINTMENT (OUTPATIENT)
Dept: UROLOGY | Facility: CLINIC | Age: 69
End: 2024-10-08
Payer: MEDICARE

## 2024-10-08 ENCOUNTER — LABORATORY RESULT (OUTPATIENT)
Age: 69
End: 2024-10-08

## 2024-10-08 VITALS
RESPIRATION RATE: 16 BRPM | OXYGEN SATURATION: 99 % | BODY MASS INDEX: 41.23 KG/M2 | SYSTOLIC BLOOD PRESSURE: 151 MMHG | HEART RATE: 62 BPM | WEIGHT: 210 LBS | HEIGHT: 60 IN | DIASTOLIC BLOOD PRESSURE: 82 MMHG

## 2024-10-08 DIAGNOSIS — Q62.39 OTHER OBSTRUCTIVE DEFECTS OF RENAL PELVIS AND URETER: ICD-10-CM

## 2024-10-08 PROCEDURE — 99213 OFFICE O/P EST LOW 20 MIN: CPT

## 2024-10-10 DIAGNOSIS — R79.89 OTHER SPECIFIED ABNORMAL FINDINGS OF BLOOD CHEMISTRY: ICD-10-CM

## 2024-11-04 ENCOUNTER — APPOINTMENT (OUTPATIENT)
Dept: UROLOGY | Facility: CLINIC | Age: 69
End: 2024-11-04
Payer: MEDICARE

## 2024-11-04 VITALS
HEIGHT: 60 IN | HEART RATE: 81 BPM | RESPIRATION RATE: 16 BRPM | BODY MASS INDEX: 41.23 KG/M2 | TEMPERATURE: 97 F | SYSTOLIC BLOOD PRESSURE: 139 MMHG | WEIGHT: 210 LBS | DIASTOLIC BLOOD PRESSURE: 81 MMHG | OXYGEN SATURATION: 99 %

## 2024-11-04 DIAGNOSIS — R79.89 OTHER SPECIFIED ABNORMAL FINDINGS OF BLOOD CHEMISTRY: ICD-10-CM

## 2024-11-04 PROCEDURE — 99213 OFFICE O/P EST LOW 20 MIN: CPT

## 2024-11-04 PROCEDURE — 76775 US EXAM ABDO BACK WALL LIM: CPT

## 2024-11-05 LAB
ALBUMIN SERPL ELPH-MCNC: 4.3 G/DL
ANION GAP SERPL CALC-SCNC: 13 MMOL/L
BUN SERPL-MCNC: 36 MG/DL
CALCIUM SERPL-MCNC: 9.2 MG/DL
CHLORIDE SERPL-SCNC: 107 MMOL/L
CO2 SERPL-SCNC: 22 MMOL/L
CREAT SERPL-MCNC: 1.8 MG/DL
EGFR: 30 ML/MIN/1.73M2
GLUCOSE SERPL-MCNC: 94 MG/DL
PHOSPHATE SERPL-MCNC: 3.7 MG/DL
POTASSIUM SERPL-SCNC: 5.6 MMOL/L
SODIUM SERPL-SCNC: 142 MMOL/L

## 2025-03-06 ENCOUNTER — INPATIENT (INPATIENT)
Facility: HOSPITAL | Age: 70
LOS: 2 days | Discharge: HOME | End: 2025-03-09
Attending: HOSPITALIST | Admitting: HOSPITALIST
Payer: MEDICARE

## 2025-03-06 VITALS
HEIGHT: 60 IN | HEART RATE: 108 BPM | SYSTOLIC BLOOD PRESSURE: 131 MMHG | DIASTOLIC BLOOD PRESSURE: 60 MMHG | RESPIRATION RATE: 22 BRPM | WEIGHT: 210.1 LBS | TEMPERATURE: 98 F | OXYGEN SATURATION: 97 %

## 2025-03-06 DIAGNOSIS — D64.9 ANEMIA, UNSPECIFIED: ICD-10-CM

## 2025-03-06 DIAGNOSIS — H26.9 UNSPECIFIED CATARACT: Chronic | ICD-10-CM

## 2025-03-06 DIAGNOSIS — E87.20 ACIDOSIS, UNSPECIFIED: ICD-10-CM

## 2025-03-06 DIAGNOSIS — E78.5 HYPERLIPIDEMIA, UNSPECIFIED: ICD-10-CM

## 2025-03-06 DIAGNOSIS — Z96.659 PRESENCE OF UNSPECIFIED ARTIFICIAL KNEE JOINT: Chronic | ICD-10-CM

## 2025-03-06 DIAGNOSIS — Z90.49 ACQUIRED ABSENCE OF OTHER SPECIFIED PARTS OF DIGESTIVE TRACT: Chronic | ICD-10-CM

## 2025-03-06 DIAGNOSIS — A41.9 SEPSIS, UNSPECIFIED ORGANISM: ICD-10-CM

## 2025-03-06 DIAGNOSIS — Z96.0 PRESENCE OF UROGENITAL IMPLANTS: Chronic | ICD-10-CM

## 2025-03-06 DIAGNOSIS — Z98.89 OTHER SPECIFIED POSTPROCEDURAL STATES: Chronic | ICD-10-CM

## 2025-03-06 DIAGNOSIS — E03.9 HYPOTHYROIDISM, UNSPECIFIED: ICD-10-CM

## 2025-03-06 DIAGNOSIS — Z98.890 OTHER SPECIFIED POSTPROCEDURAL STATES: Chronic | ICD-10-CM

## 2025-03-06 LAB
ALBUMIN SERPL ELPH-MCNC: 2.7 G/DL — LOW (ref 3.3–5)
ALP SERPL-CCNC: 78 U/L — SIGNIFICANT CHANGE UP (ref 40–120)
ALT FLD-CCNC: 14 U/L — SIGNIFICANT CHANGE UP (ref 12–78)
ANION GAP SERPL CALC-SCNC: 6 MMOL/L — SIGNIFICANT CHANGE UP (ref 5–17)
APPEARANCE UR: ABNORMAL
APTT BLD: 30.8 SEC — SIGNIFICANT CHANGE UP (ref 24.5–35.6)
AST SERPL-CCNC: 11 U/L — LOW (ref 15–37)
BASOPHILS # BLD AUTO: 0.06 K/UL — SIGNIFICANT CHANGE UP (ref 0–0.2)
BASOPHILS NFR BLD AUTO: 0.4 % — SIGNIFICANT CHANGE UP (ref 0–2)
BILIRUB SERPL-MCNC: <0.1 MG/DL — LOW (ref 0.2–1.2)
BILIRUB UR-MCNC: NEGATIVE — SIGNIFICANT CHANGE UP
BUN SERPL-MCNC: 62 MG/DL — HIGH (ref 7–23)
CALCIUM SERPL-MCNC: 9.5 MG/DL — SIGNIFICANT CHANGE UP (ref 8.5–10.1)
CHLORIDE SERPL-SCNC: 110 MMOL/L — HIGH (ref 96–108)
CO2 SERPL-SCNC: 17 MMOL/L — LOW (ref 22–31)
COLOR SPEC: YELLOW — SIGNIFICANT CHANGE UP
CREAT SERPL-MCNC: 3.89 MG/DL — HIGH (ref 0.5–1.3)
DIFF PNL FLD: ABNORMAL
EGFR: 12 ML/MIN/1.73M2 — LOW
EGFR: 12 ML/MIN/1.73M2 — LOW
EOSINOPHIL # BLD AUTO: 0.13 K/UL — SIGNIFICANT CHANGE UP (ref 0–0.5)
EOSINOPHIL NFR BLD AUTO: 0.9 % — SIGNIFICANT CHANGE UP (ref 0–6)
GLUCOSE SERPL-MCNC: 178 MG/DL — HIGH (ref 70–99)
GLUCOSE UR QL: NEGATIVE MG/DL — SIGNIFICANT CHANGE UP
HCT VFR BLD CALC: 28.5 % — LOW (ref 34.5–45)
HGB BLD-MCNC: 8.8 G/DL — LOW (ref 11.5–15.5)
IMM GRANULOCYTES NFR BLD AUTO: 0.4 % — SIGNIFICANT CHANGE UP (ref 0–0.9)
INR BLD: 1.04 RATIO — SIGNIFICANT CHANGE UP (ref 0.85–1.16)
KETONES UR-MCNC: NEGATIVE MG/DL — SIGNIFICANT CHANGE UP
LACTATE SERPL-SCNC: 0.8 MMOL/L — SIGNIFICANT CHANGE UP (ref 0.7–2)
LEUKOCYTE ESTERASE UR-ACNC: ABNORMAL
LYMPHOCYTES # BLD AUTO: 18.2 % — SIGNIFICANT CHANGE UP (ref 13–44)
LYMPHOCYTES # BLD AUTO: 2.52 K/UL — SIGNIFICANT CHANGE UP (ref 1–3.3)
MCHC RBC-ENTMCNC: 29.5 PG — SIGNIFICANT CHANGE UP (ref 27–34)
MCHC RBC-ENTMCNC: 30.9 G/DL — LOW (ref 32–36)
MCV RBC AUTO: 95.6 FL — SIGNIFICANT CHANGE UP (ref 80–100)
MONOCYTES # BLD AUTO: 0.9 K/UL — SIGNIFICANT CHANGE UP (ref 0–0.9)
MONOCYTES NFR BLD AUTO: 6.5 % — SIGNIFICANT CHANGE UP (ref 2–14)
NEUTROPHILS # BLD AUTO: 10.14 K/UL — HIGH (ref 1.8–7.4)
NEUTROPHILS NFR BLD AUTO: 73.6 % — SIGNIFICANT CHANGE UP (ref 43–77)
NITRITE UR-MCNC: NEGATIVE — SIGNIFICANT CHANGE UP
NRBC BLD AUTO-RTO: 0 /100 WBCS — SIGNIFICANT CHANGE UP (ref 0–0)
PH UR: 6.5 — SIGNIFICANT CHANGE UP (ref 5–8)
PLATELET # BLD AUTO: 380 K/UL — SIGNIFICANT CHANGE UP (ref 150–400)
POTASSIUM SERPL-MCNC: 4.9 MMOL/L — SIGNIFICANT CHANGE UP (ref 3.5–5.3)
POTASSIUM SERPL-SCNC: 4.9 MMOL/L — SIGNIFICANT CHANGE UP (ref 3.5–5.3)
PROT SERPL-MCNC: 7.5 GM/DL — SIGNIFICANT CHANGE UP (ref 6–8.3)
PROT UR-MCNC: 100 MG/DL
PROTHROM AB SERPL-ACNC: 11.7 SEC — SIGNIFICANT CHANGE UP (ref 9.9–13.4)
RBC # BLD: 2.98 M/UL — LOW (ref 3.8–5.2)
RBC # FLD: 14.7 % — HIGH (ref 10.3–14.5)
SODIUM SERPL-SCNC: 133 MMOL/L — LOW (ref 135–145)
SP GR SPEC: 1.01 — SIGNIFICANT CHANGE UP (ref 1–1.03)
UROBILINOGEN FLD QL: 0.2 MG/DL — SIGNIFICANT CHANGE UP (ref 0.2–1)
WBC # BLD: 13.81 K/UL — HIGH (ref 3.8–10.5)
WBC # FLD AUTO: 13.81 K/UL — HIGH (ref 3.8–10.5)

## 2025-03-06 PROCEDURE — 99222 1ST HOSP IP/OBS MODERATE 55: CPT

## 2025-03-06 PROCEDURE — 74176 CT ABD & PELVIS W/O CONTRAST: CPT | Mod: 26

## 2025-03-06 PROCEDURE — 99285 EMERGENCY DEPT VISIT HI MDM: CPT

## 2025-03-06 PROCEDURE — 93010 ELECTROCARDIOGRAM REPORT: CPT

## 2025-03-06 RX ORDER — ONDANSETRON HCL/PF 4 MG/2 ML
4 VIAL (ML) INJECTION EVERY 8 HOURS
Refills: 0 | Status: DISCONTINUED | OUTPATIENT
Start: 2025-03-06 | End: 2025-03-09

## 2025-03-06 RX ORDER — ACETAMINOPHEN 500 MG/5ML
650 LIQUID (ML) ORAL EVERY 6 HOURS
Refills: 0 | Status: DISCONTINUED | OUTPATIENT
Start: 2025-03-06 | End: 2025-03-09

## 2025-03-06 RX ORDER — ACETAMINOPHEN 500 MG/5ML
1000 LIQUID (ML) ORAL ONCE
Refills: 0 | Status: COMPLETED | OUTPATIENT
Start: 2025-03-06 | End: 2025-03-06

## 2025-03-06 RX ORDER — MAGNESIUM, ALUMINUM HYDROXIDE 200-200 MG
30 TABLET,CHEWABLE ORAL EVERY 4 HOURS
Refills: 0 | Status: DISCONTINUED | OUTPATIENT
Start: 2025-03-06 | End: 2025-03-09

## 2025-03-06 RX ORDER — CEFTRIAXONE 500 MG/1
1000 INJECTION, POWDER, FOR SOLUTION INTRAMUSCULAR; INTRAVENOUS ONCE
Refills: 0 | Status: COMPLETED | OUTPATIENT
Start: 2025-03-06 | End: 2025-03-06

## 2025-03-06 RX ORDER — MELATONIN 5 MG
3 TABLET ORAL AT BEDTIME
Refills: 0 | Status: DISCONTINUED | OUTPATIENT
Start: 2025-03-06 | End: 2025-03-09

## 2025-03-06 RX ORDER — B1/B2/B3/B5/B6/B12/VIT C/FOLIC 500-0.5 MG
1 TABLET ORAL
Refills: 0 | DISCHARGE

## 2025-03-06 RX ORDER — CEFTRIAXONE 500 MG/1
1000 INJECTION, POWDER, FOR SOLUTION INTRAMUSCULAR; INTRAVENOUS EVERY 24 HOURS
Refills: 0 | Status: DISCONTINUED | OUTPATIENT
Start: 2025-03-07 | End: 2025-03-09

## 2025-03-06 RX ORDER — HEPARIN SODIUM 1000 [USP'U]/ML
5000 INJECTION INTRAVENOUS; SUBCUTANEOUS EVERY 8 HOURS
Refills: 0 | Status: DISCONTINUED | OUTPATIENT
Start: 2025-03-06 | End: 2025-03-09

## 2025-03-06 RX ORDER — LEVOTHYROXINE SODIUM 300 MCG
175 TABLET ORAL DAILY
Refills: 0 | Status: DISCONTINUED | OUTPATIENT
Start: 2025-03-07 | End: 2025-03-09

## 2025-03-06 RX ORDER — ATORVASTATIN CALCIUM 80 MG/1
10 TABLET, FILM COATED ORAL AT BEDTIME
Refills: 0 | Status: DISCONTINUED | OUTPATIENT
Start: 2025-03-06 | End: 2025-03-09

## 2025-03-06 RX ORDER — B1/B2/B3/B5/B6/B12/VIT C/FOLIC 500-0.5 MG
1 TABLET ORAL DAILY
Refills: 0 | Status: DISCONTINUED | OUTPATIENT
Start: 2025-03-07 | End: 2025-03-09

## 2025-03-06 RX ADMIN — Medication 650 MILLIGRAM(S): at 19:49

## 2025-03-06 RX ADMIN — Medication 500 MILLILITER(S): at 15:56

## 2025-03-06 RX ADMIN — ATORVASTATIN CALCIUM 10 MILLIGRAM(S): 80 TABLET, FILM COATED ORAL at 21:41

## 2025-03-06 RX ADMIN — HEPARIN SODIUM 5000 UNIT(S): 1000 INJECTION INTRAVENOUS; SUBCUTANEOUS at 21:41

## 2025-03-06 RX ADMIN — Medication 650 MILLIGRAM(S): at 18:49

## 2025-03-06 RX ADMIN — CEFTRIAXONE 100 MILLIGRAM(S): 500 INJECTION, POWDER, FOR SOLUTION INTRAMUSCULAR; INTRAVENOUS at 15:56

## 2025-03-06 NOTE — GOALS OF CARE CONVERSATION - ADVANCED CARE PLANNING - CONVERSATION DETAILS
Code status is full code. Would want chest compressions and intubation if needed. Wants all life-sustaining measures. No limitations on care at this time. States her late  had a tracheostomy prior to passing away and she "would never want that." . Would like son, Cale Mcmullen to make her decisions for her if she were to become unable to make her own medical decisions. Code status is full code. Would want chest compressions and intubation if needed. Wants all life-sustaining measures. No limitations on care at this time. States her late  had a tracheostomy prior to passing away and she "would never want that." . Would like son, Cale Mcmullen ([phone]) to make her decisions for her if she were to become unable to make her own medical decisions.

## 2025-03-06 NOTE — ED ADULT TRIAGE NOTE - CHIEF COMPLAINT QUOTE
Sent by Dr. Forrest to for low kidney function was low. Patient unable to tell values, blood tests taken Tuesday. Complains of abdominal pain, no n/v/d. PMH kidney stents x2, HTN, DM

## 2025-03-06 NOTE — ED ADULT TRIAGE NOTE - WEIGHT IN LBS
The following are the instructions for home care, to inform you about your treatment and to help you with comfort and to control the pain and symptoms.    Please proceed to Emergency Room at any time if the medical condition would worsen significantly.    You may consider over-the-counter OTC remedies as needed as advised by Pharmacist.  Tylenol, ibuprofen, ice pack, lidocaine OTC.  Reduce the pressure on the affected are while sitting.     On those days when you are taking the antibiotic, please take probiotic (considered \"good bacteria\") orally to reduce the risk of diarrhea from antibiotic. Yogurt, for instance, is a good source of live culture of probiotic.  Probiotics should be taken orally about 3 hours after the antibiotic dose.  Stop antibiotic if you would develop diarrhea while taking antibiotic and then discuss such situation with your primary care provider.    Please continue oral hydration to prevent dehydration.    Please contact a Primary Care Provider PCP for follow-up next week.     I have placed referral to Wound Care ASAP.     Please call back with any additional questions to make sure that your needs are attended and all of your questions have been answered.   If any tests have been performed and the test results are not available at this time, we will notify you about the test results as soon as they become available. You may also see your test results at [email or web address]     210.1

## 2025-03-06 NOTE — PATIENT PROFILE ADULT - VISION (WITH CORRECTIVE LENSES IF THE PATIENT USUALLY WEARS THEM):
PSA order extended.    Partially impaired: cannot see medication labels or newsprint, but can see obstacles in path, and the surrounding layout; can count fingers at arm's length

## 2025-03-06 NOTE — ED PROVIDER NOTE - NS ED ROS FT
CONSTITUTIONAL: No weight loss, fever, chills, weakness or fatigue.    HEENT:    Eyes: No visual loss, blurred vision, double vision or yellow sclerae.  Ears, Nose, Throat: No hearing loss, sneezing, congestion, runny nose or sore throat.    CARDIOVASCULAR: No chest pain, chest pressure or chest discomfort. No palpitations or edema.    RESPIRATORY: No shortness of breath, cough or sputum.    GASTROINTESTINAL: No anorexia, nausea, vomiting or diarrhea.  Positive for abdominal pain  SKIN: No rash or itching.    GENITOURINARY: Patient denies any changes to bowel or bladder function.    NEUROLOGICAL: No headache, dizziness, syncope, paralysis, ataxia, numbness or tingling in the extremities. No change in bowel or bladder control.    MUSCULOSKELETAL: No muscle, back pain, joint pain or stiffness.

## 2025-03-06 NOTE — H&P ADULT - ASSESSMENT
Komal Mcmullen is a 69 year old female with PMHx of HLD, hypothyroidism, and congenital ureteral stricture (s/p pyeloplasty with stent insertion in 2022 and s/p b/l ureteral stent placement in 9/2024) who presented to the ED on 3/6/25 for complaints of abnormal lab results and admitted for sepsis secondary to UTI and ELLI on CKD.    Sepsis secondary to UTI  In pt with hx of b/l ureteral stent placement in 9/2024  Reports cloudy urine x few weeks, denies dysuria or malodorous urine  , RR 22, WBC 13.81K on admission  U/A with proteinuria, moderate blood, large leuks, negative nitrites, WBC TNTC, RBC 15, many bacteria, squamous epithelial cells present, moderate amorphous urates  Prior urine culture (on 9/26/22) 50,000 - 99,000 CFU E. coli, pan-sensitive  S/p  cc bolus, ceftriaxone 1 g IV, and acetaminophen 1 g IV in the ED  Ceftriaxone 1 g IV continued, antibiotic choice based on prior urine culture sensitivities  F/u blood cultures, urine culture  Monitor vital signs and WBC trend  Urology recommendations appreciated    ELLI on CKD IIIb  NAGMA, suspect secondary to above  Reports Cr 4.3 on recent blood work from PCP two days ago, advised by nephrology Dr. Stein to come to ER  Bicarb 17, BUN 62,  and Cr 3.89 on admission, unknown baseline Cr but Cr 1.80 (on 11/4/24)  U/A with proteinuria  CT A/P without significant decrease in the R. hydronephrosis  Avoid nephrotoxins, renally dose meds  S/p  cc bolus in the ED  Avoid nephrotoxins, renally dose meds  Encourage PO hydration  F/u SPEP, UPEP, Hep B, Hep C, HIV, ANCA, ELLI, anti-GBM Ab  Monitor renal function  Pending official nephrology eval - please f/u in AM    Acute on chronic normocytic anemia, possibly secondary to CKD?  Hgb 8.8 on admission, unknown baseline but hgb 10.3 (on 9/11/24)  No signs of active bleeding  F/u anemia panel, monitor hgb, transfuse for hgb < 7      Chronic medical conditions:  HLD: PTA simvastatin 20 mg qhs reordered as atorvastatin 10 mg qhs given simvastatin not on formulary  Hypothyroidism: PTA levothyroxine 175 mcg    Medication reconciliation completed using med list provided by patient.    Plan of care discussed with son at bedside. Komal Mcmullen is a 69 year old female with PMHx of HLD, hypothyroidism, and congenital ureteral stricture (s/p pyeloplasty with stent insertion in 2022 and s/p b/l ureteral stent placement in 9/2024) who presented to the ED on 3/6/25 for complaints of abnormal lab results and admitted for sepsis secondary to UTI and ELLI on CKD stage IIIb.    Sepsis secondary to UTI  In pt with hx of b/l ureteral stent placement in 9/2024  Reports cloudy urine x few weeks, denies dysuria or malodorous urine  , RR 22, WBC 13.81K on admission  U/A with proteinuria, moderate blood, large leuks, negative nitrites, WBC TNTC, RBC 15, many bacteria, squamous epithelial cells present, moderate amorphous urates  Prior urine culture (on 9/26/22) with 50,000 - 99,000 CFU E. coli, pan-sensitive  S/p  cc bolus, ceftriaxone 1 g IV, and acetaminophen 1 g IV in the ED  Ceftriaxone 1 g IV continued, antibiotic choice based on prior urine culture sensitivities  F/u blood cultures, urine culture  Monitor vital signs and WBC trend  Urology recommendations appreciated    ELLI on CKD stage IIIb  NAGMA, suspect secondary to above  Reports Cr 4.3 on recent blood work from PCP two days ago, advised by nephrology Dr. Stein to come to ER  Bicarb 17, BUN 62,  and Cr 3.89 on admission, unknown baseline Cr but Cr 1.80 (on 11/4/24)  U/A with proteinuria  CT A/P without significant decrease in the R. hydronephrosis  Avoid nephrotoxins, renally dose meds  S/p  cc bolus in the ED  Avoid nephrotoxins, renally dose meds  Encourage PO hydration  F/u SPEP, UPEP, Hep B, Hep C, HIV, ANCA, ELLI, anti-GBM Ab  Monitor bicarb and renal function  Pending official nephrology eval - please f/u in AM    Acute on chronic normocytic anemia, possibly secondary to CKD?  Hgb 8.8 on admission, unknown baseline but hgb 10.3 (on 9/11/24)  No signs of active bleeding  F/u anemia panel, monitor hgb, transfuse for hgb < 7      Chronic medical conditions:  HLD: PTA simvastatin 20 mg qhs reordered as atorvastatin 10 mg qhs given simvastatin not on formulary  Hypothyroidism: PTA levothyroxine 175 mcg    Medication reconciliation completed using med list provided by patient.    Plan of care discussed with son at bedside.

## 2025-03-06 NOTE — H&P ADULT - NSICDXPASTMEDICALHX_GEN_ALL_CORE_FT
PAST MEDICAL HISTORY:  HLD (hyperlipidemia)     Hypothyroidism     Ureteral stricture      PAST MEDICAL HISTORY:  HLD (hyperlipidemia)     Hypothyroidism     Stage 3 chronic kidney disease     Ureteral stricture

## 2025-03-06 NOTE — ED PROVIDER NOTE - CLINICAL SUMMARY MEDICAL DECISION MAKING FREE TEXT BOX
This patient is a 69-year-old female presenting to the emergency department today for abnormal lab result.  Has an elevated white blood cell count of 13.81.  No acute anemia noted.  No thrombocytopenia noted.  INR is within normal limits.  Lactate is negative.  Patient has no significant electrode abnormalities.  Does have an ELLI with a creatinine of 3.89.  No elevated LFTs.  Patient's urinalysis is positive for urinary tract infection.  Given Rocephin for this.  CT of the abdomen pelvis shows bilateral ureteral stents in good position.  No significant decrease in right hydronephrosis.    Patient was given Rocephin for the urinary tract infection.  Blood cultures were sent.    I discussed this case with the patient's nephrologist.  Per recommendation, the patient should be admitted to the hospital for further workup and management.  Patient was given Tylenol for pain control.    Patient expressed understanding of findings and was in at this time, the hospitalist was paged, however they have not returned my page for admission.  For this reason, the patient will be signed out to the oncoming physician.  Please see the oncoming physician's addendum's, notes, and progress notes for any change in this patient's management or care. This patient is a 69-year-old female presenting to the emergency department today for abnormal lab result.  Has an elevated white blood cell count of 13.81.  No acute anemia noted.  No thrombocytopenia noted.  INR is within normal limits.  Lactate is negative.  Patient has no significant electrode abnormalities.  Does have an ELLI with a creatinine of 3.89.  No elevated LFTs.  Patient's urinalysis is positive for urinary tract infection.  Given Rocephin for this.  CT of the abdomen pelvis shows bilateral ureteral stents in good position.  No significant decrease in right hydronephrosis.    Patient was given Rocephin for the urinary tract infection.  Blood cultures were sent.    I discussed this case with the patient's nephrologist.  Per recommendation, the patient should be admitted to the hospital for further workup and management.  Patient was given Tylenol for pain control.    Patient expressed understanding of findings and was in at this time.  I was able to discuss this case with the hospitalist service who accepted admission for this patient.  She excepted admission for the patient.  Per her recommendation, the patient will be discussed with urology.

## 2025-03-06 NOTE — ED ADULT NURSE NOTE - NSICDXPASTSURGICALHX_GEN_ALL_CORE_FT
DR. JOSE RAMON CONTRERAS PAST SURGICAL HISTORY:  Cataract b/l surgery    H/O total knee replacement b/l    History of pyloroplasty     S/P  section     S/P laparoscopic cholecystectomy     S/P lumbar laminectomy     S/P ureteral stent placement

## 2025-03-06 NOTE — H&P ADULT - HISTORY OF PRESENT ILLNESS
Komal Mcmullen is a 69 year old female with PMHx of HLD, hypothyroidism, and congenital ureteral stricture (s/p pyeloplasty with stent insertion in 2022 and s/p b/l ureteral stent placement in 9/2024) who presented to the ED on 3/6/25 for complaints of abnormal lab results.    Patient reports she was previously advised by urology to see nephrology due to elevated creatinine despite ureteral stent placement. Went to see Dr. Stein for the first time 1-2 months ago. Asymptomatic but then received routine blood work with PCP on Tuesday. Received call from PCP on Wednesday morning informing her that her creatinine is 4.3 and to call Dr. Stein. Patient received call back from Dr. Stein this morning who advised her to come to the ER due to worsening renal function. Of note, patient with cloudy urine for the past few weeks. Denies dysuria or malodorous urine. No hematuria. Baseline functional status is ambulates with cane and independent with all ADLs. Lives at home alone.    In the ED, VSS except HR as elevated as 108 and RR as elevated as 22. WBC 13.81K, hgb 8.8, sodium 133, bicarb 17, BUN 62, Cr 3.89. U/A with proteinuria, moderate blood, large leuks, negative nitrites, WBC TNTC, RBC 15, many bacteria, squamous epithelial cells present, moderate amorphous urates. CT A/P without significant decrease in the right hydronephrosis. Received  cc bolus, ceftriaxone 1 g IV, and acetaminophen 1 g IV. Request ER physician to consult urology.

## 2025-03-06 NOTE — H&P ADULT - NSICDXPASTSURGICALHX_GEN_ALL_CORE_FT
PAST SURGICAL HISTORY:  H/O total knee replacement b/l    History of pyloroplasty     S/P  section     S/P laparoscopic cholecystectomy     S/P lumbar laminectomy     S/P ureteral stent placement

## 2025-03-06 NOTE — H&P ADULT - TIME BILLING
coordination of care with ER physician and ER hold RN, obtaining history, performing a physical examination, reviewing and interpreting labs and imaging, ordering further studies and tests, explaining the diagnosis and treatment plan to patient and son at bedside, completing medication reconciliation, and documentation as above.

## 2025-03-06 NOTE — H&P ADULT - NSHPPHYSICALEXAM_GEN_ALL_CORE
T(C): 36.8 (03-06-25 @ 23:27), Max: 37 (03-06-25 @ 20:00)  HR: 76 (03-06-25 @ 20:00) (76 - 108)  BP: 118/52 (03-06-25 @ 23:27) (118/52 - 135/67)  RR: 24 (03-06-25 @ 20:00) (18 - 24)  SpO2: 97% (03-06-25 @ 23:27) (97% - 100%)    CONSTITUTIONAL: Well groomed, no apparent distress, pleasant, conversational  EYES: PERRLA and symmetric, EOMI  ENMT: Oral mucosa with dry membranes  RESP: No respiratory distress, no use of accessory muscles; CTA b/l  CV: RRR  GI: Soft, NT, ND  SKIN: b/l LE with nonpitting edema

## 2025-03-06 NOTE — H&P ADULT - NSHPLABSRESULTS_GEN_ALL_CORE
8.8    13.81 )-----------( 380      ( 06 Mar 2025 12:16 )             28.5     133[L]  |  110[H]  |  62[H]  ----------------------------<  178[H]       4.9   |  17[L]  |  3.89[H]    Ca    9.5      06 Mar 2025 12:16    TPro  7.5  /  Alb  2.7[L]  /  TBili  <0.1[L]  /  DBili  x   /  AST  11[L]  /  ALT  14  /  AlkPhos  78      PT/INR: 11.7/1.04 (25 @ 15:45)  PTT: 30.8 (25 @ 15:45)    Urinalysis Basic - ( 06 Mar 2025 12:16 )  Color: Yellow / Appearance: Turbid / S.009 / pH: 6.5  Gluc: Negative mg/dL / Ketone: Negative mg/dL  / Bili: Negative / Urobili: 0.2 mg/dL   Blood: Moderate / Protein: 100 mg/dL / Nitrite: Negative   Leuk Esterase: Large / RBC: 15 /HPF / WBC Too Numerous to count /HPF   Sq Epi: x / Bacteria: Many /HPF  Hyaline Casts: x/WBC Casts: x    Urinalysis with Rflx Culture (collected 06 Mar 2025 12:16)    CT A/P without contrast 3/6/25  FINDINGS:  LOWER CHEST: Within normal limits.    LIVER: Within normal limits.  BILE DUCTS: Normal caliber.  GALLBLADDER: Cholecystectomy.  SPLEEN: Within normal limits.  PANCREAS: Within normal limits.  ADRENALS: Within normal limits.  KIDNEYS/URETERS: Bilateral double pigtail ureteral stents extending from the renal pelves to the bladder. The left kidney demonstrates dilated extrarenal pelvis. The right kidney demonstrates dilatation of the calyces and renal pelvis similar to the preprocedure study. Renal cortical thinning is again seen.. Lower pole right renal calculus again identified    BLADDER: Within normal limits.  REPRODUCTIVE ORGANS: Uterus and adnexa within normal limits.    BOWEL: No bowel obstruction. Appendix is normal. There is colonic diverticulosis without diverticulitis  PERITONEUM/RETROPERITONEUM: Within normal limits.  VESSELS: Within normal limits.  LYMPH NODES: No lymphadenopathy.  ABDOMINAL WALL: Small fat-containing supraumbilical hernia.  BONES: Degenerative changes. Laminectomies are seen in the mid to lower lumbar spine    IMPRESSION:  Since the prior study, patient has undergone bilateral ureteral stents which are in good position. There is however no significant decrease in the right hydronephrosis.

## 2025-03-06 NOTE — PATIENT PROFILE ADULT - FALL HARM RISK - HARM RISK INTERVENTIONS

## 2025-03-06 NOTE — ED PROVIDER NOTE - OBJECTIVE STATEMENT
This patient is a 69-year-old female presenting to the emergency department today for an abnormal lab result.  She has a past medical history of hypothyroidism and hyperlipidemia.  She also has a past medical history of congenital ureteral stricture status post robotic pyeloplasty with stent insertion in 2022.  She does see nephrology and spoke with her nephrologist today who recommended that she come to the emergency department for evaluation for her kidneys.  She states that her "kidney numbers" are low.  She cannot tell me what the values are at this time.  She states that she has some generalized abdominal pain.  No nausea, vomiting, or diarrhea.  No chest pain or shortness of breath.  No change to her vision or hearing.  No other complaints at this time.
hypoglycemia, drank juice in the field now 201

## 2025-03-06 NOTE — ED ADULT NURSE NOTE - OBJECTIVE STATEMENT
Pt AOx3, responsive, ambulatory. Pt sent by Dr Stein for low kidney function from blood work done 3/4/25. Pt c/o lower abdominal pain and b/l flank pain x 1 month; states 3 kidney stones in bladder and 3 kidney stones about 2 months ago, also c/o cloudy urine. Pt denies fever/chills, n/v/d, burning/painful urination,  symptoms. Hx Kidney stents x 2, HTN, DM.

## 2025-03-06 NOTE — CONSULT NOTE ADULT - SUBJECTIVE AND OBJECTIVE BOX
Patient is a 69y old  Female who presents with a chief complaint of     HPI: 68 yo F hypothyroidism, hld, congenital ureteral stricture status post robotic pyeloplasty with stent insertion in , CKD[facility] elevated Cr on outpt labs. pt last saw Dr Josue in november, who referred her to nephrology Dr Stein whom she saw in december. pt had outpt lab work monday with CBC called by PCP told it was 4.3, contacted Dr Stein and advised ed visit. pt does endorse nausea, loss of appetite, general malaise x 2-3 months. 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 650 milliGRAM(s) Oral every 6 hours PRN Temp greater or equal to 38C (100.4F), Mild Pain (1 - 3)  aluminum hydroxide/magnesium hydroxide/simethicone Suspension 30 milliLiter(s) Oral every 4 hours PRN Dyspepsia  melatonin 3 milliGRAM(s) Oral at bedtime PRN Insomnia  ondansetron Injectable 4 milliGRAM(s) IV Push every 8 hours PRN Nausea and/or Vomiting      Allergies    No Known Allergies    Intolerances        SOCIAL HISTORY nonsmoker          FAMILY HISTORY:  No pertinent family history in first degree relatives        Vital Signs Last 24 Hrs  T(C): 37 (06 Mar 2025 20:00), Max: 37 (06 Mar 2025 20:00)  T(F): 98.6 (06 Mar 2025 20:00), Max: 98.6 (06 Mar 2025 20:00)  HR: 76 (06 Mar 2025 20:00) (76 - 108)  BP: 128/71 (06 Mar 2025 20:00) (128/71 - 135/67)  BP(mean): --  RR: 24 (06 Mar 2025 20:00) (18 - 24)  SpO2: 98% (06 Mar 2025 20:00) (97% - 100%)    Parameters below as of 06 Mar 2025 20:00  Patient On (Oxygen Delivery Method): room air        Physical Exam:  General:  Appears stated age, well-groomed, well-nourished, no distress  Eyes: EOMI, conjunctiva clear  Chest: no respiratory distress, no accessory muscle use  Abdomen:  obese, nondistended, nontender  : no suprapubic tenderness, neg cvat bilateral   Neuro/Psych:  Alert, oriented to time, place and person     LABS:                        8.8    13.81 )-----------( 380      ( 06 Mar 2025 12:16 )             28.5     03-06    133[L]  |  110[H]  |  62[H]  ----------------------------<  178[H]  4.9   |  17[L]  |  3.89[H]    Ca    9.5      06 Mar 2025 12:16    TPro  7.5  /  Alb  2.7[L]  /  TBili  <0.1[L]  /  DBili  x   /  AST  11[L]  /  ALT  14  /  AlkPhos  78  03-06    PT/INR - ( 06 Mar 2025 15:45 )   PT: 11.7 sec;   INR: 1.04 ratio         PTT - ( 06 Mar 2025 15:45 )  PTT:30.8 sec  Urinalysis Basic - ( 06 Mar 2025 12:16 )    Color: Yellow / Appearance: Turbid / S.009 / pH: x  Gluc: 178 mg/dL / Ketone: Negative mg/dL  / Bili: Negative / Urobili: 0.2 mg/dL   Blood: x / Protein: 100 mg/dL / Nitrite: Negative   Leuk Esterase: Large / RBC: 15 /HPF / WBC Too Numerous to count /HPF   Sq Epi: x / Non Sq Epi: x / Bacteria: Many /HPF      < from: CT Abdomen and Pelvis No Cont (25 @ 15:06) >    PROCEDURE:  CT of the Abdomen and Pelvis was performed.  Sagittal and coronal reformats were performed.    FINDINGS:  LOWER CHEST: Within normal limits.    LIVER: Within normal limits.  BILE DUCTS: Normal caliber.  GALLBLADDER: Cholecystectomy.  SPLEEN: Within normal limits.  PANCREAS: Within normal limits.  ADRENALS: Within normal limits.  KIDNEYS/URETERS: Bilateral double pigtail ureteral stents extending from   the renal pelves to the bladder. The left kidney demonstrates dilated   extrarenal pelvis. The right kidney demonstrates dilatation of the   calyces and renal pelvis similar to the preprocedure study. Renal   cortical thinning is again seen.. Lower pole right renal calculus again   identified    BLADDER: Within normal limits.  REPRODUCTIVE ORGANS: Uterus and adnexa within normal limits.    BOWEL: No bowel obstruction. Appendix is normal. There is colonic   diverticulosis without diverticulitis  PERITONEUM/RETROPERITONEUM: Within normal limits.  VESSELS: Within normal limits.  LYMPH NODES: No lymphadenopathy.  ABDOMINAL WALL: Small fat-containing supraumbilical hernia.  BONES: Degenerative changes. Laminectomies are seen in the mid to lower   lumbar spine    IMPRESSION:  Since the prior study, patient has undergone bilateral ureteral stents   which are in good position. There is however no significant decrease in  the right hydronephrosis.    --- End of Report ---    < end of copied text >          A/P:  68 yo F hypothyroidism, hld, congenital ureteral stricture status post robotic pyeloplasty with stent insertion in , CKD, s/p bilateral metal stents in septemeber with Dr josue here with ELLI on CKD, and UTI.  found to have persistent right sided hydronephrosis   stents are in good position. metal stents are not due to be changed until 2025, hydro is not worsening   -c/w antibiotics, Follow up UCx  -advise [facility]  -recommend nephrology consult  -continue care per primary team  -case discussed with Dr Josue

## 2025-03-06 NOTE — ED PROVIDER NOTE - PHYSICAL EXAMINATION
GENERAL: The patient appears well and is in no apparent distress.    EYES: Pupils equal and reactive.  Extraocular eye movements are intact.    ENT: Head is atraumatic.  Posterior oropharynx is unremarkable.      RESPIRATORY: Lungs are clear to auscultation bilaterally.  The patient has no significant wheezing, rhonchi, or rales.    CARDIOVASCULAR: The patient has a regular rate and rhythm with no significant murmurs, rubs, or gallops.    ABDOMEN: Abdomen is soft, nondistended, and non-peritoneal.  Generalized tenderness to palpation    SKIN: Skin is intact without evidence of significant lacerations or sores.    MUSCULOSKELETAL: Patient has good range of motion of all extremities.  The patient has good distal cap refill.  The patient has palpable distal pulses.  No obvious edema is noted.    NEUROLOGIC: Cranial nerves II through XII are grossly within normal limits.  Sensory and motor examination is unremarkable.    PSYCHIATRIC: Patient is awake, alert, and oriented ×4.

## 2025-03-06 NOTE — ED ADULT NURSE NOTE - ALCOHOL PRE SCREEN (AUDIT - C)
Information given by: mother    Chief Complaint   Patient presents with    Follow-up    Nasal Symptoms    Cough         Subjective:     Patient ID: Karolyn Man  is a 3 y o  male    Patient started with earache few days ago  Complaining of both ear pain  Described as mild and frequent  No history of drainage from the ear  It seems to be intermittent  Patient started with nasal congestion gradually about a month ago  This congestion has been intermittent  Presently just slightly congested  No history of purulent discharge or breathing difficulty  No history of fever vomiting or diarrhea  Patient started with cough a few days ago  Mild intermittent cough  It is only occasional     Patient complaining of clogged ears  Sibling had fever and now cough  The following portions of the patient's history were reviewed and updated as appropriate: allergies, current medications, past family history, past medical history, past social history, past surgical history and problem list     Review of Systems   Constitutional: Negative for activity change and fever  HENT: Positive for congestion  Negative for ear discharge, ear pain, rhinorrhea, sore throat and voice change  Eyes: Negative for discharge  Respiratory: Positive for cough  Negative for chest tightness and wheezing  Cardiovascular: Negative for chest pain  Gastrointestinal: Negative for abdominal distention, diarrhea and vomiting  Skin: Negative for rash  Neurological: Negative for seizures  History reviewed  No pertinent past medical history  Social History     Social History    Marital status: Single     Spouse name: N/A    Number of children: N/A    Years of education: N/A     Occupational History    Not on file       Social History Main Topics    Smoking status: Never Smoker    Smokeless tobacco: Never Used    Alcohol use Not on file    Drug use: Unknown    Sexual activity: Not on file     Other Topics Concern    Not on file     Social History Narrative    No narrative on file       Family History   Problem Relation Age of Onset    No Known Problems Mother     No Known Problems Father     Mental illness Neg Hx     Substance Abuse Neg Hx         No Known Allergies    No current outpatient prescriptions on file prior to visit  No current facility-administered medications on file prior to visit  Objective:    Vitals:    11/06/18 1542   Pulse: 96   Resp: 20   Temp: 97 5 °F (36 4 °C)   TempSrc: Oral   Weight: 38 6 kg (85 lb)   Height: 4' 6" (1 372 m)       Physical Exam   Constitutional: He appears well-developed and well-nourished  He is active  No distress  HENT:   Head: No signs of injury  Nose: Nose normal  No nasal discharge (Nasal congestion)  Mouth/Throat: Mucous membranes are moist  No tonsillar exudate  Oropharynx is clear  Pharynx is normal    Left middle ear with fluid  Clear fluid  Right middle ear with pus and redness  Nose slightly congested  Eyes: Pupils are equal, round, and reactive to light  Conjunctivae are normal  Right eye exhibits no discharge  Left eye exhibits no discharge  Neck: Normal range of motion  Neck supple  No neck rigidity or neck adenopathy  Cardiovascular: Normal rate and regular rhythm  No murmur (no murmurs heard) heard  Pulmonary/Chest: Effort normal and breath sounds normal  There is normal air entry  No stridor  No respiratory distress  Air movement is not decreased  He has no rales  He exhibits no retraction  Abdominal: Soft  Bowel sounds are normal  He exhibits no distension  There is no hepatosplenomegaly  There is no tenderness  Neurological: He is alert  No cranial nerve deficit  He exhibits normal muscle tone  Coordination normal    Skin: Skin is warm  Capillary refill takes less than 3 seconds  No petechiae, no purpura and no rash noted  No cyanosis  No jaundice or pallor           Assessment/Plan:    Diagnoses and all orders for this visit:    Acute suppurative otitis media of right ear without spontaneous rupture of tympanic membrane, recurrence not specified  -     amoxicillin (AMOXIL) 400 MG/5ML suspension; Take 10 mL (800 mg total) by mouth every 12 (twelve) hours for 10 days    Upper respiratory tract infection, unspecified type          Follow up if no improvement, symptoms worsen, reaction to medication and problems with treatment plan  Requested call back or appointment if any questions or problems  Discussed symptomatic treatment  MOTHER AGREE WITH PLAN AND ACKNOWLEDGE UNDERSTANDING              Instructions: Follow up if no improvement, symptoms worsen and/or problems with treatment plan  Requested call back or appointment if any questions or problems  Statement Selected

## 2025-03-07 LAB
ANION GAP SERPL CALC-SCNC: 10 MMOL/L — SIGNIFICANT CHANGE UP (ref 5–17)
BUN SERPL-MCNC: 58 MG/DL — HIGH (ref 7–23)
CALCIUM SERPL-MCNC: 9.2 MG/DL — SIGNIFICANT CHANGE UP (ref 8.5–10.1)
CHLORIDE SERPL-SCNC: 114 MMOL/L — HIGH (ref 96–108)
CO2 SERPL-SCNC: 15 MMOL/L — LOW (ref 22–31)
CREAT SERPL-MCNC: 3.64 MG/DL — HIGH (ref 0.5–1.3)
EGFR: 13 ML/MIN/1.73M2 — LOW
EGFR: 13 ML/MIN/1.73M2 — LOW
FERRITIN SERPL-MCNC: 407 NG/ML — HIGH (ref 13–330)
FOLATE SERPL-MCNC: 10.3 NG/ML — SIGNIFICANT CHANGE UP
GLUCOSE SERPL-MCNC: 99 MG/DL — SIGNIFICANT CHANGE UP (ref 70–99)
HCT VFR BLD CALC: 27.8 % — LOW (ref 34.5–45)
HGB BLD-MCNC: 8.3 G/DL — LOW (ref 11.5–15.5)
IRON SATN MFR SERPL: 28 % — SIGNIFICANT CHANGE UP (ref 14–50)
IRON SATN MFR SERPL: 74 UG/DL — SIGNIFICANT CHANGE UP (ref 30–160)
MCHC RBC-ENTMCNC: 29 PG — SIGNIFICANT CHANGE UP (ref 27–34)
MCHC RBC-ENTMCNC: 29.9 G/DL — LOW (ref 32–36)
MCV RBC AUTO: 97.2 FL — SIGNIFICANT CHANGE UP (ref 80–100)
NRBC BLD AUTO-RTO: 0 /100 WBCS — SIGNIFICANT CHANGE UP (ref 0–0)
PLATELET # BLD AUTO: 356 K/UL — SIGNIFICANT CHANGE UP (ref 150–400)
POTASSIUM SERPL-MCNC: 4.4 MMOL/L — SIGNIFICANT CHANGE UP (ref 3.5–5.3)
POTASSIUM SERPL-SCNC: 4.4 MMOL/L — SIGNIFICANT CHANGE UP (ref 3.5–5.3)
PROT SERPL-MCNC: 6.1 G/DL — SIGNIFICANT CHANGE UP (ref 6–8.3)
RBC # BLD: 2.86 M/UL — LOW (ref 3.8–5.2)
RBC # FLD: 14.9 % — HIGH (ref 10.3–14.5)
SODIUM SERPL-SCNC: 139 MMOL/L — SIGNIFICANT CHANGE UP (ref 135–145)
TIBC SERPL-MCNC: 267 UG/DL — SIGNIFICANT CHANGE UP (ref 220–430)
TRANSFERRIN SERPL-MCNC: 223 MG/DL — SIGNIFICANT CHANGE UP (ref 200–360)
UIBC SERPL-MCNC: 193 UG/DL — SIGNIFICANT CHANGE UP (ref 110–370)
VIT B12 SERPL-MCNC: 1087 PG/ML — SIGNIFICANT CHANGE UP (ref 232–1245)
WBC # BLD: 9.08 K/UL — SIGNIFICANT CHANGE UP (ref 3.8–10.5)
WBC # FLD AUTO: 9.08 K/UL — SIGNIFICANT CHANGE UP (ref 3.8–10.5)

## 2025-03-07 PROCEDURE — 99233 SBSQ HOSP IP/OBS HIGH 50: CPT

## 2025-03-07 PROCEDURE — 99232 SBSQ HOSP IP/OBS MODERATE 35: CPT

## 2025-03-07 RX ADMIN — HEPARIN SODIUM 5000 UNIT(S): 1000 INJECTION INTRAVENOUS; SUBCUTANEOUS at 15:27

## 2025-03-07 RX ADMIN — Medication 1 TABLET(S): at 15:27

## 2025-03-07 RX ADMIN — ATORVASTATIN CALCIUM 10 MILLIGRAM(S): 80 TABLET, FILM COATED ORAL at 21:45

## 2025-03-07 RX ADMIN — HEPARIN SODIUM 5000 UNIT(S): 1000 INJECTION INTRAVENOUS; SUBCUTANEOUS at 21:45

## 2025-03-07 RX ADMIN — HEPARIN SODIUM 5000 UNIT(S): 1000 INJECTION INTRAVENOUS; SUBCUTANEOUS at 05:17

## 2025-03-07 RX ADMIN — Medication 3 MILLIGRAM(S): at 21:45

## 2025-03-07 RX ADMIN — Medication 100 MILLILITER(S): at 17:39

## 2025-03-07 RX ADMIN — CEFTRIAXONE 100 MILLIGRAM(S): 500 INJECTION, POWDER, FOR SOLUTION INTRAMUSCULAR; INTRAVENOUS at 15:27

## 2025-03-07 RX ADMIN — Medication 175 MICROGRAM(S): at 05:17

## 2025-03-07 NOTE — PROGRESS NOTE ADULT - SUBJECTIVE AND OBJECTIVE BOX
no overnight events. doing well overall. appetite returning, abx are working    Physical Exam  T(C): 36.4 (03-07-25 @ 10:34), Max: 37 (03-06-25 @ 20:00)  HR: 90 (03-07-25 @ 10:34) (76 - 91)  BP: 108/65 (03-07-25 @ 10:34) (97/58 - 135/67)  RR: 19 (03-07-25 @ 10:34) (18 - 24)  SpO2: 95% (03-07-25 @ 10:34) (95% - 100%)  General Appearance nad, no head trauma.   EYES no scleral icterus.   CHEST clear to auscultation bilaterally, no wheezes/rhonchi/rales.   HEART RRR, normal S1 S2, no murmurs, clicks or rubs.   ABDOMEN soft NT/ND, BS present.   EXTREMITIES no clubbing, no cyanosis, no edema.   Skin normal skin, limited exam.   Psych normal affect.     Pertinent Labs/Imaging:  hgb 8.3  creat 3.64

## 2025-03-07 NOTE — PROGRESS NOTE ADULT - SUBJECTIVE AND OBJECTIVE BOX
Patient seen and examined bedside resting comfortably.  No complaints offered.   Endorses decreased appetite.   Voiding without difficulty.  Denies hematuria and dysuria.  Denies nausea and vomiting. Tolerating diet.  Denies chest pain, dyspnea, cough.    T(F): 97 (03-07-25 @ 05:30), Max: 98.6 (03-06-25 @ 20:00)  HR: 85 (03-07-25 @ 05:30) (76 - 108)  BP: 97/58 (03-07-25 @ 05:30) (97/58 - 135/67)  RR: 19 (03-07-25 @ 05:30) (18 - 24)  SpO2: 95% (03-07-25 @ 05:30) (95% - 100%)  Wt(kg): --  CAPILLARY BLOOD GLUCOSE          PHYSICAL EXAM:  General: NAD, alert and awake  HEENT: NCAT, EOMI, conjunctiva clear  Chest: nonlabored respirations, good inspiratory effort  Abdomen: soft, NTND.   Extremities: no pedal edema or calf tenderness noted   : No suprapubic tenderness or palpable bladder    LABS:                        8.3    9.08  )-----------( 356      ( 07 Mar 2025 08:44 )             27.8   03-06    133[L]  |  110[H]  |  62[H]  ----------------------------<  178[H]  4.9   |  17[L]  |  3.89[H]    Ca    9.5      06 Mar 2025 12:16    TPro  7.5  /  Alb  2.7[L]  /  TBili  <0.1[L]  /  DBili  x   /  AST  11[L]  /  ALT  14  /  AlkPhos  78  03-06  PT/INR - ( 06 Mar 2025 15:45 )   PT: 11.7 sec;   INR: 1.04 ratio         PTT - ( 06 Mar 2025 15:45 )  PTT:30.8 sec  I&O's Detail      Impression: 69y Female PMHx hypothyroidism, hld, congenital ureteral stricture status post robotic pyeloplasty with stent insertion in 2022, CKD, s/p bilateral metal ureteral stent placement in September with Dr Josue here with ELLI on CKD, and UTI. Found to have persistent right sided hydronephrosis, unchanged from outpatient US in November.  Stents are in good position. Metal stents are not due to be changed until september 2025, hydro is not worsening     Plan:  - Continue rocephin  - Follow up urine culture  - Encouraged PO hydration, recommend IV hydration as tolerated  - Recommend nephrology evaluation  - Continue care per primary team  - Will discuss with urology attending

## 2025-03-07 NOTE — CONSULT NOTE ADULT - SUBJECTIVE AND OBJECTIVE BOX
Patient chart reviewed, full consult to follow.     Thank you for the courtesy of this consultation. [facility] NEPHR[facility], [facility]  NEPHROLOGY AND HYPERTENSION  [address]  [address]  [place] [address]  [phone]    MD WALDO BAXTER MD YELENA ROSENBERG, MD BINNY KOSHY, MD CHRISTOPHER CAPUTO, MD EDWARD BOVER, MD      Information from chart:  "Patient is a 69y old  Female who presents with a chief complaint of Sepsis secondary to UTI, ELLI on CKD stage IIIb (07 Mar 2025 12:55)    HPI:  Komal Mcmullen is a 69 year old female with PMHx of HLD, hypothyroidism, and congenital ureteral stricture (s/p pyeloplasty with stent insertion in  and s/p b/l ureteral stent placement in 2024) who presented to the ED on 3/6/25 for complaints of abnormal lab results.    Patient reports she was previously advised by urology to see nephrology due to elevated creatinine despite ureteral stent placement. Went to see Dr. Stein for the first time 1-2 months ago. Asymptomatic but then received routine blood work with PCP on Tuesday. Received call from PCP on Wednesday morning informing her that her creatinine is 4.3 and to call Dr. Stein. Patient received call back from Dr. Stein this morning who advised her to come to the ER due to worsening renal function. Of note, patient with cloudy urine for the past few weeks. Denies dysuria or malodorous urine. No hematuria. Baseline functional status is ambulates with cane and independent with all ADLs. Lives at home alone.    In the ED, VSS except HR as elevated as 108 and RR as elevated as 22. WBC 13.81K, hgb 8.8, sodium 133, bicarb 17, BUN 62, Cr 3.89. U/A with proteinuria, moderate blood, large leuks, negative nitrites, WBC TNTC, RBC 15, many bacteria, squamous epithelial cells present, moderate amorphous urates. CT A/P without significant decrease in the right hydronephrosis. Received  cc bolus, ceftriaxone 1 g IV, and acetaminophen 1 g IV. Request ER physician to consult urology.  (06 Mar 2025 23:19)   "    PAST MEDICAL & SURGICAL HISTORY:  HLD (hyperlipidemia)      Hypothyroidism      Ureteral stricture      Stage 3 chronic kidney disease      S/P  section      S/P lumbar laminectomy      H/O total knee replacement  b/l      History of pyloroplasty      S/P ureteral stent placement      S/P laparoscopic cholecystectomy        FAMILY HISTORY:    Allergies    No Known Allergies    Intolerances      Home Medications:  Multiple Vitamins oral tablet: 1 tab(s) orally once a day (06 Mar 2025 20:16)  simvastatin 20 mg oral tablet: 1 tab(s) orally once a day (at bedtime) (06 Mar 2025 11:54)  Synthroid 175 mcg (0.175 mg) oral tablet: 1 tab(s) orally once a day am (06 Mar 2025 11:54)    MEDICATIONS  (STANDING):  atorvastatin 10 milliGRAM(s) Oral at bedtime  cefTRIAXone   IVPB 1000 milliGRAM(s) IV Intermittent every 24 hours  heparin   Injectable 5000 Unit(s) SubCutaneous every 8 hours  levothyroxine 175 MICROGram(s) Oral daily  multivitamin 1 Tablet(s) Oral daily  sodium chloride 0.9%. 1000 milliLiter(s) (100 mL/Hr) IV Continuous <Continuous>    MEDICATIONS  (PRN):  acetaminophen     Tablet .. 650 milliGRAM(s) Oral every 6 hours PRN Temp greater or equal to 38C (100.4F), Mild Pain (1 - 3)  aluminum hydroxide/magnesium hydroxide/simethicone Suspension 30 milliLiter(s) Oral every 4 hours PRN Dyspepsia  melatonin 3 milliGRAM(s) Oral at bedtime PRN Insomnia  ondansetron Injectable 4 milliGRAM(s) IV Push every 8 hours PRN Nausea and/or Vomiting    Vital Signs Last 24 Hrs  T(C): 36.4 (07 Mar 2025 10:34), Max: 37 (06 Mar 2025 20:00)  T(F): 97.6 (07 Mar 2025 10:34), Max: 98.6 (06 Mar 2025 20:00)  HR: 90 (07 Mar 2025 10:34) (76 - 91)  BP: 108/65 (07 Mar 2025 10:34) (97/58 - 135/67)  BP(mean): --  RR: 19 (07 Mar 2025 10:34) (18 - 24)  SpO2: 95% (07 Mar 2025 10:34) (95% - 100%)    Parameters below as of 07 Mar 2025 10:34  Patient On (Oxygen Delivery Method): room air        Daily     Daily Weight in k.2 (07 Mar 2025 01:54)    CAPILLARY BLOOD GLUCOSE        PHYSICAL EXAM:      T(C): 36.4 (25 @ 10:34), Max: 37 (25 @ 20:00)  HR: 90 (25 @ 10:34) (76 - 91)  BP: 108/65 (25 @ 10:34) (97/58 - 135/67)  RR: 19 (25 @ 10:34) (18 - 24)  SpO2: 95% (25 @ 10:34) (95% - 100%)  Wt(kg): --  Lungs clear  Heart S1S2  Abd soft NT ND  Extremities:   tr edema                  139  |  114[H]  |  58[H]  ----------------------------<  99  4.4   |  15[L]  |  3.64[H]    Ca    9.2      07 Mar 2025 08:44    TPro  7.5  /  Alb  2.7[L]  /  TBili  <0.1[L]  /  DBili  x   /  AST  11[L]  /  ALT  14  /  AlkPhos  78  -                          8.3    9.08  )-----------( 356      ( 07 Mar 2025 08:44 )             27.8     Creatinine Trend: 3.64<--, 3.89<--  Urinalysis Basic - ( 07 Mar 2025 08:44 )    Color: x / Appearance: x / SG: x / pH: x  Gluc: 99 mg/dL / Ketone: x  / Bili: x / Urobili: x   Blood: x / Protein: x / Nitrite: x   Leuk Esterase: x / RBC: x / WBC x   Sq Epi: x / Non Sq Epi: x / Bacteria: x            Assessment  ELLI CKD 3; UTI, complicated, metal ureteral stents in place.     Plan   IVF; IV abx   Will follow   Urology follow up      Victorino Stein MD        Patient chart reviewed, full consult to follow.     Thank you for the courtesy of this consultation.

## 2025-03-07 NOTE — PROGRESS NOTE ADULT - ASSESSMENT
69 year old female with PMHx of HLD, hypothyroidism, and congenital ureteral stricture (s/p pyeloplasty with stent insertion in 2022 and s/p b/l ureteral stent placement in 9/2024) who presented to the ED on 3/6/25 for complaints of abnormal lab results and admitted for sepsis secondary to UTI and ELLI on CKD stage IIIb.    Sepsis secondary to UTI  In pt with hx of b/l ureteral stent placement in 9/2024  Reports cloudy urine x few weeks, denies dysuria or malodorous urine  , RR 22, WBC 13.81K on admission  U/A with proteinuria, moderate blood, large leuks, negative nitrites, WBC TNTC, RBC 15, many bacteria, squamous epithelial cells present, moderate amorphous urates  Prior urine culture (on 9/26/22) with 50,000 - 99,000 CFU E. coli, pan-sensitive  S/p  cc bolus, ceftriaxone 1 g IV, and acetaminophen 1 g IV in the ED  Ceftriaxone 1 g IV continued, antibiotic choice based on prior urine culture sensitivities  F/u blood cultures, urine culture  Monitor vital signs and WBC trend  Urology recommendations appreciated    ELLI on CKD stage IIIb  NAGMA, suspect secondary to above  Reports Cr 4.3 on recent blood work from PCP two days ago, advised by nephrology Dr. Stein to come to ER  Bicarb 17, BUN 62,  and Cr 3.89 on admission, unknown baseline Cr but Cr 1.80 (on 11/4/24)  U/A with proteinuria  CT A/P without significant decrease in the R. hydronephrosis  Avoid nephrotoxins, renally dose meds  S/p  cc bolus in the ED  Avoid nephrotoxins, renally dose meds  Encourage PO hydration  F/u SPEP, UPEP, Hep B, Hep C, HIV, ANCA, ELLI, anti-GBM Ab  Monitor bicarb and renal function  Pending nephrology eval     Acute on chronic normocytic anemia, possibly secondary to CKD?  Hgb 8.8 on admission, unknown baseline but hgb 10.3 (on 9/11/24)  No signs of active bleeding  F/u anemia panel, monitor hgb, transfuse for hgb < 7      Chronic medical conditions:  HLD: PTA simvastatin 20 mg qhs reordered as atorvastatin 10 mg qhs given simvastatin not on formulary  Hypothyroidism: PTA levothyroxine 175 mcg

## 2025-03-08 LAB
AUTO DIFF PNL BLD: ABNORMAL
C-ANCA SER-ACNC: NEGATIVE — SIGNIFICANT CHANGE UP
GBM IGG SER-ACNC: <0.2 AI — SIGNIFICANT CHANGE UP
GLOMERULAR BASEMENT MEMBRANE A INTERPRETATION: NEGATIVE — SIGNIFICANT CHANGE UP
HBV SURFACE AB SER-ACNC: ABNORMAL
HCV RNA SPEC NAA+PROBE-LOG IU: SIGNIFICANT CHANGE UP
HCV RNA SPEC NAA+PROBE-LOG IU: SIGNIFICANT CHANGE UP LOGIU/ML
HIV 1+2 AB+HIV1 P24 AG SERPL QL IA: SIGNIFICANT CHANGE UP
P-ANCA SER-ACNC: NEGATIVE — SIGNIFICANT CHANGE UP

## 2025-03-08 PROCEDURE — 99232 SBSQ HOSP IP/OBS MODERATE 35: CPT

## 2025-03-08 RX ORDER — SODIUM BICARBONATE 1 MEQ/ML
650 SYRINGE (ML) INTRAVENOUS
Refills: 0 | Status: DISCONTINUED | OUTPATIENT
Start: 2025-03-08 | End: 2025-03-09

## 2025-03-08 RX ADMIN — ATORVASTATIN CALCIUM 10 MILLIGRAM(S): 80 TABLET, FILM COATED ORAL at 22:00

## 2025-03-08 RX ADMIN — HEPARIN SODIUM 5000 UNIT(S): 1000 INJECTION INTRAVENOUS; SUBCUTANEOUS at 13:41

## 2025-03-08 RX ADMIN — Medication 175 MICROGRAM(S): at 06:20

## 2025-03-08 RX ADMIN — HEPARIN SODIUM 5000 UNIT(S): 1000 INJECTION INTRAVENOUS; SUBCUTANEOUS at 22:00

## 2025-03-08 RX ADMIN — CEFTRIAXONE 100 MILLIGRAM(S): 500 INJECTION, POWDER, FOR SOLUTION INTRAMUSCULAR; INTRAVENOUS at 15:28

## 2025-03-08 RX ADMIN — Medication 650 MILLIGRAM(S): at 17:41

## 2025-03-08 RX ADMIN — Medication 1 TABLET(S): at 11:51

## 2025-03-08 RX ADMIN — HEPARIN SODIUM 5000 UNIT(S): 1000 INJECTION INTRAVENOUS; SUBCUTANEOUS at 06:19

## 2025-03-08 NOTE — PROGRESS NOTE ADULT - SUBJECTIVE AND OBJECTIVE BOX
Resting    Vital Signs Last 24 Hrs  T(C): 36.3 (03-08-25 @ 16:48), Max: 36.8 (03-08-25 @ 05:12)  T(F): 97.4 (03-08-25 @ 16:48), Max: 98.3 (03-08-25 @ 05:12)  HR: 64 (03-08-25 @ 16:48) (64 - 86)  BP: 104/65 (03-08-25 @ 16:48) (104/65 - 122/65)  RR: 17 (03-08-25 @ 16:48) (17 - 18)  SpO2: 95% (03-08-25 @ 16:48) (95% - 98%)    Lungs b/l air entry  Heart S1S2  Abd soft ND  Extremities:   tr edema                        8.3    9.08  )-----------( 356      ( 07 Mar 2025 08:44 )             27.8     07 Mar 2025 08:44    139    |  114    |  58     ----------------------------<  99     4.4     |  15     |  3.64     Ca    9.2        07 Mar 2025 08:44    TPro  6.1    /  Alb  x      /  TBili  x      /  DBili  x      /  AST  x      /  ALT  x      /  AlkPhos  x      07 Mar 2025 08:44    LIVER FUNCTIONS - ( 07 Mar 2025 08:44 )  Alb: x     / Pro: 6.1 g/dL / ALK PHOS: x     / ALT: x     / AST: x     / GGT: x           Culture - Blood (collected 06 Mar 2025 15:45)  Source: Blood Blood  Preliminary Report (08 Mar 2025 02:02):    No growth at 24 hours    Culture - Blood (collected 06 Mar 2025 15:40)  Source: Blood Blood  Preliminary Report (08 Mar 2025 02:02):    No growth at 24 hours    Culture - Urine (collected 06 Mar 2025 12:16)  Source: Clean Catch  Final Report (07 Mar 2025 14:00):    <10,000 CFU/mL Normal Urogenital Maria E    acetaminophen     Tablet .. 650 milliGRAM(s) Oral every 6 hours PRN  aluminum hydroxide/magnesium hydroxide/simethicone Suspension 30 milliLiter(s) Oral every 4 hours PRN  atorvastatin 10 milliGRAM(s) Oral at bedtime  cefTRIAXone   IVPB 1000 milliGRAM(s) IV Intermittent every 24 hours  heparin   Injectable 5000 Unit(s) SubCutaneous every 8 hours  levothyroxine 175 MICROGram(s) Oral daily  melatonin 3 milliGRAM(s) Oral at bedtime PRN  multivitamin 1 Tablet(s) Oral daily  ondansetron Injectable 4 milliGRAM(s) IV Push every 8 hours PRN  sodium bicarbonate 650 milliGRAM(s) Oral two times a day    Home Medications:  Multiple Vitamins oral tablet: 1 tab(s) orally once a day (06 Mar 2025 20:16)  simvastatin 20 mg oral tablet: 1 tab(s) orally once a day (at bedtime) (06 Mar 2025 11:54)  Synthroid 175 mcg (0.175 mg) oral tablet: 1 tab(s) orally once a day am (06 Mar 2025 11:54)    Assessment/Plan:    ELLI/CKD 3, possibly obstructive (Cr 1.8 - 11/4/24)  B/l ureteral stent w/some R hydro   Recommend  eval  Avoid nephrotoxins   Continue Na Bicarb  BMP in am    [phone]

## 2025-03-08 NOTE — PROGRESS NOTE ADULT - SUBJECTIVE AND OBJECTIVE BOX
no overnight events. dia t bedside. doing well no concerns.     Physical Exam  T(C): 36.4 (03-08-25 @ 11:05), Max: 36.8 (03-08-25 @ 05:12)  HR: 86 (03-08-25 @ 11:05) (79 - 86)  BP: 118/64 (03-08-25 @ 11:05) (111/69 - 122/65)  RR: 18 (03-08-25 @ 11:05) (17 - 18)  SpO2: 96% (03-08-25 @ 11:05) (96% - 98%)  General Appearance nad, no head trauma.   EYES no scleral icterus.   BACK no CVA , no spinal tenderness.   ABDOMEN soft NT/ND, BS present.   EXTREMITIES no clubbing, no cyanosis, no edema.   Skin normal skin, limited exam.   Psych normal affect.     Pertinent Labs/Imaging:  pending today no overnight events. dia t bedside. doing well no concerns.     Physical Exam  T(C): 36.4 (03-08-25 @ 11:05), Max: 36.8 (03-08-25 @ 05:12)  HR: 86 (03-08-25 @ 11:05) (79 - 86)  BP: 118/64 (03-08-25 @ 11:05) (111/69 - 122/65)  RR: 18 (03-08-25 @ 11:05) (17 - 18)  SpO2: 96% (03-08-25 @ 11:05) (96% - 98%)  General Appearance nad, no head trauma.   EYES no scleral icterus.   BACK no CVA , no spinal tenderness.   ABDOMEN soft NT/ND, BS present.   EXTREMITIES no clubbing, no cyanosis, no edema.   Skin normal skin, limited exam.   Psych normal affect.     Pertinent Labs/Imaging:  bicarb 15

## 2025-03-08 NOTE — PROGRESS NOTE ADULT - ASSESSMENT
69 year old female with PMHx of HLD, hypothyroidism, and congenital ureteral stricture (s/p pyeloplasty with stent insertion in 2022 and s/p b/l ureteral stent placement in 9/2024) who presented to the ED on 3/6/25 for complaints of abnormal lab results and admitted for sepsis secondary to UTI and ELLI on CKD stage IIIb.    Sepsis secondary to UTI  In pt with hx of b/l ureteral stent placement in 9/2024  Reports cloudy urine x few weeks, denies dysuria or malodorous urine  , RR 22, WBC 13.81K on admission  U/A with proteinuria, moderate blood, large leuks, negative nitrites, WBC TNTC, RBC 15, many bacteria, squamous epithelial cells present, moderate amorphous urates  Prior urine culture (on 9/26/22) with 50,000 - 99,000 CFU E. coli, pan-sensitive  S/p  cc bolus, ceftriaxone 1 g IV, and acetaminophen 1 g IV in the ED  Ceftriaxone 1 g IV continued, antibiotic choice based on prior urine culture sensitivities  F/u blood cultures, urine culture ngtd 1 day  Monitor vital signs and WBC trend  Urology recommendations appreciated    ELLI on CKD stage IIIb  NAGMA, suspect secondary to above  Reports Cr 4.3 on recent blood work from PCP two days ago, advised by nephrology Dr. Stein to come to ER  Bicarb 17, BUN 62,  and Cr 3.89 on admission, unknown baseline Cr but Cr 1.80 (on 11/4/24)  U/A with proteinuria  CT A/P without significant decrease in the R. hydronephrosis  Avoid nephrotoxins, renally dose meds  S/p  cc bolus in the ED  Avoid nephrotoxins, renally dose meds  Encourage PO hydration  F/u SPEP, UPEP, Hep B, Hep C, HIV, ANCA, ELLI, anti-GBM Ab  Monitor bicarb and renal function  Pending nephrology eval     Acute on chronic normocytic anemia, possibly secondary to CKD?  Hgb 8.8 on admission, unknown baseline but hgb 10.3 (on 9/11/24)  No signs of active bleeding  F/u anemia panel, monitor hgb, transfuse for hgb < 7      Chronic medical conditions:  HLD: PTA simvastatin 20 mg qhs reordered as atorvastatin 10 mg qhs given simvastatin not on formulary  Hypothyroidism: PTA levothyroxine 175 mcg    possible dc once blood culture is ngtd 2 days 69 year old female with PMHx of HLD, hypothyroidism, and congenital ureteral stricture (s/p pyeloplasty with stent insertion in 2022 and s/p b/l ureteral stent placement in 9/2024) who presented to the ED on 3/6/25 for complaints of abnormal lab results and admitted for sepsis secondary to UTI and ELLI on CKD stage IIIb.    Sepsis secondary to UTI  In pt with hx of b/l ureteral stent placement in 9/2024  Reports cloudy urine x few weeks, denies dysuria or malodorous urine  , RR 22, WBC 13.81K on admission  U/A with proteinuria, moderate blood, large leuks, negative nitrites, WBC TNTC, RBC 15, many bacteria, squamous epithelial cells present, moderate amorphous urates  Prior urine culture (on 9/26/22) with 50,000 - 99,000 CFU E. coli, pan-sensitive  S/p  cc bolus, ceftriaxone 1 g IV, and acetaminophen 1 g IV in the ED  Ceftriaxone 1 g IV continued, antibiotic choice based on prior urine culture sensitivities  F/u blood cultures, urine culture ngtd 1 day  Monitor vital signs and WBC trend  Urology recommendations appreciated    ELLI on CKD stage IIIb  NAGMA, suspect secondary to above  Reports Cr 4.3 on recent blood work from PCP two days ago, advised by nephrology Dr. Stein to come to ER  Bicarb 17, BUN 62,  and Cr 3.89 on admission, unknown baseline Cr but Cr 1.80 (on 11/4/24)  U/A with proteinuria  CT A/P without significant decrease in the R. hydronephrosis  Avoid nephrotoxins, renally dose meds  Encourage PO hydration  F/u SPEP, UPEP, Hep B, Hep C, HIV, ANCA, ELLI, anti-GBM Ab  Monitor bicarb and renal function=add na bicarb bid  nephrology eval reviewed    Acute on chronic normocytic anemia, possibly secondary to CKD?  Hgb 8.8 on admission, unknown baseline but hgb 10.3 (on 9/11/24)  No signs of active bleeding  F/u anemia panel, monitor hgb, transfuse for hgb < 7      Chronic medical conditions:  HLD: PTA simvastatin 20 mg qhs reordered as atorvastatin 10 mg qhs given simvastatin not on formulary  Hypothyroidism: PTA levothyroxine 175 mcg    possible dc once blood culture is ngtd 2 days

## 2025-03-09 VITALS
DIASTOLIC BLOOD PRESSURE: 80 MMHG | RESPIRATION RATE: 18 BRPM | TEMPERATURE: 98 F | OXYGEN SATURATION: 96 % | SYSTOLIC BLOOD PRESSURE: 128 MMHG | HEART RATE: 94 BPM

## 2025-03-09 LAB
ALBUMIN SERPL ELPH-MCNC: 2.6 G/DL — LOW (ref 3.3–5)
ALP SERPL-CCNC: 75 U/L — SIGNIFICANT CHANGE UP (ref 40–120)
ALT FLD-CCNC: 18 U/L — SIGNIFICANT CHANGE UP (ref 12–78)
ANION GAP SERPL CALC-SCNC: 7 MMOL/L — SIGNIFICANT CHANGE UP (ref 5–17)
ANION GAP SERPL CALC-SCNC: 9 MMOL/L — SIGNIFICANT CHANGE UP (ref 5–17)
AST SERPL-CCNC: 15 U/L — SIGNIFICANT CHANGE UP (ref 15–37)
BILIRUB SERPL-MCNC: <0.1 MG/DL — LOW (ref 0.2–1.2)
BUN SERPL-MCNC: 47 MG/DL — HIGH (ref 7–23)
BUN SERPL-MCNC: 47 MG/DL — HIGH (ref 7–23)
CALCIUM SERPL-MCNC: 9 MG/DL — SIGNIFICANT CHANGE UP (ref 8.5–10.1)
CALCIUM SERPL-MCNC: 9.1 MG/DL — SIGNIFICANT CHANGE UP (ref 8.5–10.1)
CHLORIDE SERPL-SCNC: 115 MMOL/L — HIGH (ref 96–108)
CHLORIDE SERPL-SCNC: 117 MMOL/L — HIGH (ref 96–108)
CO2 SERPL-SCNC: 15 MMOL/L — LOW (ref 22–31)
CO2 SERPL-SCNC: 19 MMOL/L — LOW (ref 22–31)
CREAT SERPL-MCNC: 2.9 MG/DL — HIGH (ref 0.5–1.3)
CREAT SERPL-MCNC: 3.05 MG/DL — HIGH (ref 0.5–1.3)
CREATININE, URINE RESULT: 44 MG/DL — SIGNIFICANT CHANGE UP
EGFR: 16 ML/MIN/1.73M2 — LOW
EGFR: 16 ML/MIN/1.73M2 — LOW
EGFR: 17 ML/MIN/1.73M2 — LOW
EGFR: 17 ML/MIN/1.73M2 — LOW
GLUCOSE SERPL-MCNC: 157 MG/DL — HIGH (ref 70–99)
GLUCOSE SERPL-MCNC: 93 MG/DL — SIGNIFICANT CHANGE UP (ref 70–99)
HCT VFR BLD CALC: 25.1 % — LOW (ref 34.5–45)
HGB BLD-MCNC: 7.8 G/DL — LOW (ref 11.5–15.5)
MAGNESIUM SERPL-MCNC: 2.2 MG/DL — SIGNIFICANT CHANGE UP (ref 1.6–2.6)
MCHC RBC-ENTMCNC: 29.9 PG — SIGNIFICANT CHANGE UP (ref 27–34)
MCHC RBC-ENTMCNC: 31.1 G/DL — LOW (ref 32–36)
MCV RBC AUTO: 96.2 FL — SIGNIFICANT CHANGE UP (ref 80–100)
NRBC BLD AUTO-RTO: 0 /100 WBCS — SIGNIFICANT CHANGE UP (ref 0–0)
PLATELET # BLD AUTO: 295 K/UL — SIGNIFICANT CHANGE UP (ref 150–400)
POTASSIUM SERPL-MCNC: 4.3 MMOL/L — SIGNIFICANT CHANGE UP (ref 3.5–5.3)
POTASSIUM SERPL-MCNC: 4.5 MMOL/L — SIGNIFICANT CHANGE UP (ref 3.5–5.3)
POTASSIUM SERPL-SCNC: 4.3 MMOL/L — SIGNIFICANT CHANGE UP (ref 3.5–5.3)
POTASSIUM SERPL-SCNC: 4.5 MMOL/L — SIGNIFICANT CHANGE UP (ref 3.5–5.3)
PROT ?TM UR-MCNC: 55 MG/DL — HIGH (ref 0–12)
PROT SERPL-MCNC: 6.9 GM/DL — SIGNIFICANT CHANGE UP (ref 6–8.3)
RBC # BLD: 2.61 M/UL — LOW (ref 3.8–5.2)
RBC # FLD: 15.2 % — HIGH (ref 10.3–14.5)
SODIUM SERPL-SCNC: 141 MMOL/L — SIGNIFICANT CHANGE UP (ref 135–145)
SODIUM SERPL-SCNC: 141 MMOL/L — SIGNIFICANT CHANGE UP (ref 135–145)
WBC # BLD: 9.88 K/UL — SIGNIFICANT CHANGE UP (ref 3.8–10.5)
WBC # FLD AUTO: 9.88 K/UL — SIGNIFICANT CHANGE UP (ref 3.8–10.5)

## 2025-03-09 PROCEDURE — 99238 HOSP IP/OBS DSCHRG MGMT 30/<: CPT

## 2025-03-09 RX ORDER — SENNA 187 MG
1 TABLET ORAL
Qty: 60 | Refills: 0
Start: 2025-03-09 | End: 2025-04-07

## 2025-03-09 RX ORDER — ATORVASTATIN CALCIUM 80 MG/1
1 TABLET, FILM COATED ORAL
Qty: 30 | Refills: 0
Start: 2025-03-09 | End: 2025-04-07

## 2025-03-09 RX ORDER — SODIUM BICARBONATE 1 MEQ/ML
1 SYRINGE (ML) INTRAVENOUS
Qty: 60 | Refills: 0
Start: 2025-03-09 | End: 2025-04-07

## 2025-03-09 RX ORDER — SENNA 187 MG
1 TABLET ORAL
Refills: 0 | Status: DISCONTINUED | OUTPATIENT
Start: 2025-03-09 | End: 2025-03-09

## 2025-03-09 RX ORDER — CEFPODOXIME PROXETIL 200 MG/1
1 TABLET, FILM COATED ORAL
Qty: 3 | Refills: 0
Start: 2025-03-09 | End: 2025-03-11

## 2025-03-09 RX ORDER — POLYETHYLENE GLYCOL 3350 17 G/17G
17 POWDER, FOR SOLUTION ORAL ONCE
Refills: 0 | Status: COMPLETED | OUTPATIENT
Start: 2025-03-09 | End: 2025-03-09

## 2025-03-09 RX ADMIN — Medication 1 TABLET(S): at 12:24

## 2025-03-09 RX ADMIN — Medication 650 MILLIGRAM(S): at 05:44

## 2025-03-09 RX ADMIN — HEPARIN SODIUM 5000 UNIT(S): 1000 INJECTION INTRAVENOUS; SUBCUTANEOUS at 05:44

## 2025-03-09 RX ADMIN — POLYETHYLENE GLYCOL 3350 17 GRAM(S): 17 POWDER, FOR SOLUTION ORAL at 10:45

## 2025-03-09 RX ADMIN — Medication 175 MICROGRAM(S): at 05:44

## 2025-03-09 RX ADMIN — HEPARIN SODIUM 5000 UNIT(S): 1000 INJECTION INTRAVENOUS; SUBCUTANEOUS at 13:41

## 2025-03-09 NOTE — DISCHARGE NOTE NURSING/CASE MANAGEMENT/SOCIAL WORK - PATIENT PORTAL LINK FT
You can access the FollowMyHealth Patient Portal offered by [facility] by registering at the following website: [email or web address]. By joining "[employer]"’s FollowMyHealth portal, you will also be able to view your health information using other applications (apps) compatible with our system.

## 2025-03-09 NOTE — PROGRESS NOTE ADULT - REASON FOR ADMISSION
Sepsis secondary to UTI, ELLI on CKD stage IIIb

## 2025-03-09 NOTE — PROGRESS NOTE ADULT - SUBJECTIVE AND OBJECTIVE BOX
Patient seen and examined bedside resting comfortably.  No complaints offered.   Voiding without difficulty.  Denies hematuria and dysuria.  Denies nausea vomitin, diarrhea, fevers, chills, abd pain.  Tolerating diet.      T(F): 98.2 (03-09-25 @ 05:16), Max: 98.2 (03-09-25 @ 05:16)  HR: 85 (03-09-25 @ 05:16) (64 - 86)  BP: 115/72 (03-09-25 @ 05:16) (104/65 - 118/64)  RR: 17 (03-09-25 @ 05:16) (17 - 18)  SpO2: 98% (03-09-25 @ 05:16) (95% - 98%)  Wt(kg): --  CAPILLARY BLOOD GLUCOSE          PHYSICAL EXAM:  General: NAD, alert and awake  HEENT: NCAT, EOMI, conjunctiva clear  Chest: nonlabored respirations, good inspiratory effort  Abdomen: soft, NTND.   : no suprapubic tenderness, neg cvat bilateral     LABS:        I&O's Detail    08 Mar 2025 06:01  -  09 Mar 2025 07:00  --------------------------------------------------------  IN:    Oral Fluid: 560 mL  Total IN: 560 mL    OUT:  Total OUT: 0 mL    Total NET: 560 mL    A/P:  69y Female PMHx hypothyroidism, hld, congenital ureteral stricture status post robotic pyeloplasty with stent insertion in 2022, CKD, s/p bilateral metal ureteral stent placement in September with Dr Josue here with ELLI on CKD. Found to have persistent right sided hydronephrosis, unchanged from outpatient US in November.  Stents are in good position. Metal stents are not due to be changed until september 2025, hydro is not worsening   - f/u am labs  - Encouraged PO hydration, recommend IV hydration as tolerated  -appreciate nephrology recs  - Continue care per primary team  - discussed with Dr Josue

## 2025-03-09 NOTE — DISCHARGE NOTE NURSING/CASE MANAGEMENT/SOCIAL WORK - NSDCVIVACCINE_GEN_ALL_CORE_FT
Tdap; 14-Dec-2020 10:14; Kaylin Dumont (RN); [employer]; [id number] (Exp. Date: 21-Jul-2022); IntraMuscular; Deltoid Left.; 0.5 milliLiter(s); VIS (VIS Published: 09-May-2013, VIS Presented: 14-Dec-2020);

## 2025-03-09 NOTE — DISCHARGE NOTE PROVIDER - ATTENDING DISCHARGE PHYSICAL EXAMINATION:
Physical Exam  T(C): 36.4 (03-09-25 @ 10:51), Max: 36.8 (03-09-25 @ 05:16)  HR: 94 (03-09-25 @ 10:51) (64 - 94)  BP: 128/80 (03-09-25 @ 10:51) (104/65 - 128/80)  RR: 18 (03-09-25 @ 10:51) (17 - 18)  SpO2: 96% (03-09-25 @ 10:51) (95% - 98%)  General Appearance nad, no head trauma.   EYES no scleral icterus.   CHEST clear to auscultation bilaterally, no wheezes/rhonchi/rales.   HEART RRR, normal S1 S2, no murmurs, clicks or rubs.   BACK no CVA , no spinal tenderness.   ABDOMEN soft NT/ND, BS present.   EXTREMITIES no clubbing, no cyanosis, no edema.   Skin normal skin, limited exam.   Psych normal affect.

## 2025-03-09 NOTE — DISCHARGE NOTE PROVIDER - HOSPITAL COURSE
69 year old female with PMHx of HLD, hypothyroidism, and congenital ureteral stricture (s/p pyeloplasty with stent insertion in 2022 and s/p b/l ureteral stent placement in 9/2024) who presented to the ED on 3/6/25 for complaints of abnormal lab results and admitted for sepsis secondary to UTI and ELLI on CKD stage IIIb.    Sepsis secondary to UTI  In pt with hx of b/l ureteral stent placement in 9/2024  Reports cloudy urine x few weeks, denies dysuria or malodorous urine  , RR 22, WBC 13.81K on admission  U/A with proteinuria, moderate blood, large leuks, negative nitrites, WBC TNTC, RBC 15, many bacteria, squamous epithelial cells present, moderate amorphous urates  Prior urine culture (on 9/26/22) with 50,000 - 99,000 CFU E. coli, pan-sensitive  S/p  cc bolus, ceftriaxone 1 g IV, and acetaminophen 1 g IV in the ED  Ceftriaxone 1 g IV continued, antibiotic choice based on prior urine culture sensitivities-dc home to finish cefpodoxime 7 day total abx course  F/u blood cultures, urine culture ngtd 2 day  Monitor vital signs and WBC trend  Urology recommendations appreciated    ELLI on CKD stage IIIb  NAGMA, suspect secondary to above  Reports Cr 4.3 on recent blood work from PCP two days ago, advised by nephrology Dr. Stein to come to ER  Bicarb 17, BUN 62,  and Cr 3.89 on admission, unknown baseline Cr but Cr 1.80 (on 11/4/24)  U/A with proteinuria  CT A/P without significant decrease in the R. hydronephrosis  Avoid nephrotoxins, renally dose meds  Encourage PO hydration  F/u SPEP, UPEP, Hep B, Hep C, HIV, ANCA, ELLI, anti-GBM Ab  Monitor bicarb and renal function=add na bicarb bid  nephrology eval reviewed    Acute on chronic normocytic anemia, possibly secondary to CKD?  Hgb 8.8 on admission, unknown baseline but hgb 10.3 (on 9/11/24)  No signs of active bleeding  F/u anemia panel, monitor hgb, transfuse for hgb < 7      Chronic medical conditions:  HLD: PTA simvastatin 20 mg qhs reordered as atorvastatin 10 mg qhs given simvastatin not on formulary  Hypothyroidism: PTA levothyroxine 175 mcg    possible dc once blood culture is ngtd 2 days

## 2025-03-09 NOTE — DISCHARGE NOTE NURSING/CASE MANAGEMENT/SOCIAL WORK - FINANCIAL ASSISTANCE
[facility] provides services at a reduced cost to those who are determined to be eligible through [facility]’s financial assistance program. Information regarding [facility]’s financial assistance program can be found by going to [email or web address] or by calling [phone].

## 2025-03-09 NOTE — DISCHARGE NOTE NURSING/CASE MANAGEMENT/SOCIAL WORK - NSDCPEFALRISK_GEN_ALL_CORE
For information on Fall & Injury Prevention, visit: [email or web address] OR  [email or web address] OR  https://www.cdc.gov/steadi/patient.html

## 2025-03-09 NOTE — DISCHARGE NOTE PROVIDER - CARE PROVIDER_API CALL
Simon Josue  Urology  [address], [address]  [place] [address]  Phone: [phone]  Fax: [phone]  Follow Up Time:     Victorino Stein  Nephrology  [address], [address]  [place] [address]  Phone: [phone]  Fax: [phone]  Follow Up Time:

## 2025-03-09 NOTE — DISCHARGE NOTE PROVIDER - NSDCMRMEDTOKEN_GEN_ALL_CORE_FT
atorvastatin 10 mg oral tablet: 1 tab(s) orally once a day (at bedtime)  cefpodoxime 200 mg oral tablet: 1 tab(s) orally once a day  Multiple Vitamins oral tablet: 1 tab(s) orally once a day  senna leaf extract oral tablet: 1 tab(s) orally 2 times a day as needed for  constipation  simvastatin 20 mg oral tablet: 1 tab(s) orally once a day (at bedtime)  sodium bicarbonate 650 mg oral tablet: 1 tab(s) orally 2 times a day  Synthroid 175 mcg (0.175 mg) oral tablet: 1 tab(s) orally once a day am

## 2025-03-11 LAB — ANA TITR SER: NEGATIVE — SIGNIFICANT CHANGE UP

## 2025-03-13 LAB
% GAMMA, URINE: 14.9 % — SIGNIFICANT CHANGE UP
ALBUMIN 24H MFR UR ELPH: 26.6 % — SIGNIFICANT CHANGE UP
ALPHA1 GLOB 24H MFR UR ELPH: 18.3 % — SIGNIFICANT CHANGE UP
ALPHA2 GLOB 24H MFR UR ELPH: 15.8 % — SIGNIFICANT CHANGE UP
B-GLOBULIN 24H MFR UR ELPH: 24.4 % — SIGNIFICANT CHANGE UP
COLLECT DURATION TIME UR: 24 HR — SIGNIFICANT CHANGE UP
INTERPRETATION 24H UR IFE-IMP: SIGNIFICANT CHANGE UP
M PROTEIN 24H UR ELPH-MRATE: 0 % — SIGNIFICANT CHANGE UP
M PROTEIN 24H UR ELPH-MRATE: 0 MG/24HR — SIGNIFICANT CHANGE UP (ref 0–0)
M PROTEIN 24H UR ELPH-MRATE: 0 MG/DL — SIGNIFICANT CHANGE UP
PROT ?TM UR-MCNC: 55 MG/DL — HIGH (ref 0–12)
PROT PATTERN 24H UR ELPH-IMP: SIGNIFICANT CHANGE UP
PROTEIN QUANT CALC, URINE: 880 MG/24 H — HIGH (ref 50–100)
TOTAL VOLUME - 24 HOUR: 1600 ML — SIGNIFICANT CHANGE UP
URINE CREATININE CALCULATION: 0.7 G/24 H — LOW (ref 0.8–1.8)

## 2025-03-14 DIAGNOSIS — N39.0 URINARY TRACT INFECTION, SITE NOT SPECIFIED: ICD-10-CM

## 2025-03-14 DIAGNOSIS — Z98.42 CATARACT EXTRACTION STATUS, LEFT EYE: ICD-10-CM

## 2025-03-14 DIAGNOSIS — E78.5 HYPERLIPIDEMIA, UNSPECIFIED: ICD-10-CM

## 2025-03-14 DIAGNOSIS — Z98.41 CATARACT EXTRACTION STATUS, RIGHT EYE: ICD-10-CM

## 2025-03-14 DIAGNOSIS — Z96.0 PRESENCE OF UROGENITAL IMPLANTS: ICD-10-CM

## 2025-03-14 DIAGNOSIS — M19.90 UNSPECIFIED OSTEOARTHRITIS, UNSPECIFIED SITE: ICD-10-CM

## 2025-03-14 DIAGNOSIS — Z98.891 HISTORY OF UTERINE SCAR FROM PREVIOUS SURGERY: ICD-10-CM

## 2025-03-14 DIAGNOSIS — A41.9 SEPSIS, UNSPECIFIED ORGANISM: ICD-10-CM

## 2025-03-14 DIAGNOSIS — D63.1 ANEMIA IN CHRONIC KIDNEY DISEASE: ICD-10-CM

## 2025-03-14 DIAGNOSIS — N18.32 CHRONIC KIDNEY DISEASE, STAGE 3B: ICD-10-CM

## 2025-03-14 DIAGNOSIS — Z96.653 PRESENCE OF ARTIFICIAL KNEE JOINT, BILATERAL: ICD-10-CM

## 2025-03-14 DIAGNOSIS — Q62.8 OTHER CONGENITAL MALFORMATIONS OF URETER: ICD-10-CM

## 2025-03-14 DIAGNOSIS — Z79.890 HORMONE REPLACEMENT THERAPY: ICD-10-CM

## 2025-03-14 DIAGNOSIS — J45.909 UNSPECIFIED ASTHMA, UNCOMPLICATED: ICD-10-CM

## 2025-03-14 DIAGNOSIS — Z98.1 ARTHRODESIS STATUS: ICD-10-CM

## 2025-03-14 DIAGNOSIS — E03.9 HYPOTHYROIDISM, UNSPECIFIED: ICD-10-CM

## 2025-03-14 DIAGNOSIS — N17.9 ACUTE KIDNEY FAILURE, UNSPECIFIED: ICD-10-CM

## 2025-03-14 DIAGNOSIS — N13.30 UNSPECIFIED HYDRONEPHROSIS: ICD-10-CM

## 2025-03-14 DIAGNOSIS — E87.20 ACIDOSIS, UNSPECIFIED: ICD-10-CM

## 2025-03-14 DIAGNOSIS — G47.00 INSOMNIA, UNSPECIFIED: ICD-10-CM

## 2025-03-15 LAB
% ALBUMIN: 46.2 % — SIGNIFICANT CHANGE UP
% ALPHA 1: 8.4 % — SIGNIFICANT CHANGE UP
% ALPHA 2: 20.5 % — SIGNIFICANT CHANGE UP
% BETA: 12.1 % — SIGNIFICANT CHANGE UP
% GAMMA: 12.8 % — SIGNIFICANT CHANGE UP
% M SPIKE: SIGNIFICANT CHANGE UP
ALBUMIN SERPL ELPH-MCNC: 2.8 G/DL — LOW (ref 3.6–5.5)
ALBUMIN/GLOB SERPL ELPH: 0.8 RATIO — SIGNIFICANT CHANGE UP
ALPHA1 GLOB SERPL ELPH-MCNC: 0.5 G/DL — HIGH (ref 0.1–0.4)
ALPHA2 GLOB SERPL ELPH-MCNC: 1.3 G/DL — HIGH (ref 0.5–1)
B-GLOBULIN SERPL ELPH-MCNC: 0.7 G/DL — SIGNIFICANT CHANGE UP (ref 0.5–1)
GAMMA GLOBULIN: 0.8 G/DL — SIGNIFICANT CHANGE UP (ref 0.6–1.6)
INTERPRETATION SERPL IFE-IMP: SIGNIFICANT CHANGE UP
M-SPIKE: SIGNIFICANT CHANGE UP (ref 0–0)
PROT PATTERN SERPL ELPH-IMP: SIGNIFICANT CHANGE UP
PROT SERPL-MCNC: 6.1 G/DL — SIGNIFICANT CHANGE UP (ref 6–8.3)
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
## 2025-06-16 PROBLEM — N13.5 CROSSING VESSEL AND STRICTURE OF URETER WITHOUT HYDRONEPHROSIS: Chronic | Status: ACTIVE | Noted: 2025-03-06

## 2025-06-16 PROBLEM — E78.5 HYPERLIPIDEMIA, UNSPECIFIED: Chronic | Status: ACTIVE | Noted: 2025-03-06

## 2025-06-16 PROBLEM — E03.9 HYPOTHYROIDISM, UNSPECIFIED: Chronic | Status: ACTIVE | Noted: 2025-03-06

## 2025-08-20 ENCOUNTER — RESULT REVIEW (OUTPATIENT)
Age: 70
End: 2025-08-20

## 2025-08-20 ENCOUNTER — APPOINTMENT (OUTPATIENT)
Dept: UROLOGY | Facility: CLINIC | Age: 70
End: 2025-08-20
Payer: MEDICARE

## 2025-08-20 VITALS
TEMPERATURE: 97.7 F | OXYGEN SATURATION: 95 % | WEIGHT: 180 LBS | HEIGHT: 60 IN | BODY MASS INDEX: 35.34 KG/M2 | SYSTOLIC BLOOD PRESSURE: 151 MMHG | DIASTOLIC BLOOD PRESSURE: 79 MMHG | HEART RATE: 74 BPM

## 2025-08-20 VITALS — OXYGEN SATURATION: 99 % | HEART RATE: 83 BPM | SYSTOLIC BLOOD PRESSURE: 150 MMHG | DIASTOLIC BLOOD PRESSURE: 76 MMHG

## 2025-08-20 DIAGNOSIS — Q62.39 OTHER OBSTRUCTIVE DEFECTS OF RENAL PELVIS AND URETER: ICD-10-CM

## 2025-08-20 DIAGNOSIS — Z96.0 PRESENCE OF UROGENITAL IMPLANTS: ICD-10-CM

## 2025-08-20 DIAGNOSIS — R79.89 OTHER SPECIFIED ABNORMAL FINDINGS OF BLOOD CHEMISTRY: ICD-10-CM

## 2025-08-20 LAB
ALBUMIN SERPL ELPH-MCNC: 3.7 G/DL
ALP BLD-CCNC: 88 U/L
ALT SERPL-CCNC: 11 U/L
ANION GAP SERPL CALC-SCNC: 22 MMOL/L
APPEARANCE: ABNORMAL
AST SERPL-CCNC: 12 U/L
BACTERIA: ABNORMAL /HPF
BILIRUB SERPL-MCNC: 0.2 MG/DL
BILIRUBIN URINE: NEGATIVE
BLOOD URINE: ABNORMAL
BUN SERPL-MCNC: 86 MG/DL
CALCIUM SERPL-MCNC: 8.9 MG/DL
CAST: 9 /LPF
CHLORIDE SERPL-SCNC: 103 MMOL/L
CO2 SERPL-SCNC: 16 MMOL/L
COLOR: YELLOW
CREAT SERPL-MCNC: 5.11 MG/DL
EGFRCR SERPLBLD CKD-EPI 2021: 9 ML/MIN/1.73M2
EPITHELIAL CELLS: 6 /HPF
GLUCOSE QUALITATIVE U: 100 MG/DL
GLUCOSE SERPL-MCNC: 124 MG/DL
HYALINE CASTS: PRESENT
KETONES URINE: NEGATIVE MG/DL
LEUKOCYTE ESTERASE URINE: ABNORMAL
MICROSCOPIC-UA: NORMAL
NITRITE URINE: NEGATIVE
PH URINE: 6.5
POTASSIUM SERPL-SCNC: 5.1 MMOL/L
PROT SERPL-MCNC: 6.4 G/DL
PROTEIN URINE: 100 MG/DL
RED BLOOD CELLS URINE: 2 /HPF
REVIEW: NORMAL
SODIUM SERPL-SCNC: 141 MMOL/L
SPECIFIC GRAVITY URINE: 1.01
UROBILINOGEN URINE: 0.2 MG/DL
WHITE BLOOD CELLS URINE: >998 /HPF

## 2025-08-20 PROCEDURE — 99214 OFFICE O/P EST MOD 30 MIN: CPT

## 2025-08-23 LAB — BACTERIA UR CULT: ABNORMAL

## 2025-08-26 ENCOUNTER — OUTPATIENT (OUTPATIENT)
Dept: OUTPATIENT SERVICES | Facility: HOSPITAL | Age: 70
LOS: 1 days | End: 2025-08-26
Payer: MEDICARE

## 2025-08-26 ENCOUNTER — APPOINTMENT (OUTPATIENT)
Dept: CT IMAGING | Facility: CLINIC | Age: 70
End: 2025-08-26
Payer: MEDICARE

## 2025-08-26 DIAGNOSIS — Q62.39 OTHER OBSTRUCTIVE DEFECTS OF RENAL PELVIS AND URETER: ICD-10-CM

## 2025-08-26 DIAGNOSIS — Z98.89 OTHER SPECIFIED POSTPROCEDURAL STATES: Chronic | ICD-10-CM

## 2025-08-26 DIAGNOSIS — Z96.0 PRESENCE OF UROGENITAL IMPLANTS: Chronic | ICD-10-CM

## 2025-08-26 DIAGNOSIS — Z98.890 OTHER SPECIFIED POSTPROCEDURAL STATES: Chronic | ICD-10-CM

## 2025-08-26 DIAGNOSIS — Z96.659 PRESENCE OF UNSPECIFIED ARTIFICIAL KNEE JOINT: Chronic | ICD-10-CM

## 2025-08-26 DIAGNOSIS — Z90.49 ACQUIRED ABSENCE OF OTHER SPECIFIED PARTS OF DIGESTIVE TRACT: Chronic | ICD-10-CM

## 2025-08-26 PROCEDURE — 74176 CT ABD & PELVIS W/O CONTRAST: CPT | Mod: 26

## 2025-08-26 PROCEDURE — 74176 CT ABD & PELVIS W/O CONTRAST: CPT

## 2025-09-05 ENCOUNTER — NON-APPOINTMENT (OUTPATIENT)
Age: 70
End: 2025-09-05

## 2025-09-06 PROBLEM — N18.30 CHRONIC KIDNEY DISEASE, STAGE 3 UNSPECIFIED: Chronic | Status: INACTIVE | Noted: 2025-03-06 | Resolved: 2025-09-06

## 2025-09-08 ENCOUNTER — TRANSCRIPTION ENCOUNTER (OUTPATIENT)
Age: 70
End: 2025-09-08

## 2025-09-12 ENCOUNTER — TRANSCRIPTION ENCOUNTER (OUTPATIENT)
Age: 70
End: 2025-09-12

## (undated) DEVICE — DRAIN JACKSON PRATT 10MM FLAT FULL NO TROCAR

## (undated) DEVICE — SYR LUER LOK 20CC

## (undated) DEVICE — PRESSURE INFUSOR BAG 3000ML

## (undated) DEVICE — PACK CYSTO

## (undated) DEVICE — PACK ROBOTIC LIJ

## (undated) DEVICE — DRAIN RESERVOIR FOR JACKSON PRATT 100CC CARDINAL

## (undated) DEVICE — TUBING SUCTION NONCONDUCTIVE 6MM X 12FT

## (undated) DEVICE — XI ARM CLIP APPLIER LARGE

## (undated) DEVICE — VENODYNE/SCD SLEEVE CALF MEDIUM

## (undated) DEVICE — SUT POLYSORB 0 36" GU-46

## (undated) DEVICE — GLV 8 PROTEXIS (WHITE)

## (undated) DEVICE — SUT CLIP LAPRA-TY ABSORBABLE SIZE 0.118 TO 0.12" VIOLET

## (undated) DEVICE — TUBING LEVEL ONE NORMOFLO SET

## (undated) DEVICE — DRSG TEGADERM 6"X8"

## (undated) DEVICE — POSITIONER PURPLE ARM ONE STEP (LARGE)

## (undated) DEVICE — XI DRAPE COLUMN

## (undated) DEVICE — GLV 7.5 PROTEXIS (WHITE)

## (undated) DEVICE — XI ARM FORCEP MARYLAND BIPOLAR

## (undated) DEVICE — POSITIONER PINK PAD PIGAZZI SYSTEM

## (undated) DEVICE — TUBING AIRSEAL TRI-LUMEN FILTERED

## (undated) DEVICE — D HELP - CLEARVIEW CLEARIFY SYSTEM

## (undated) DEVICE — XI SEAL UNIV 5- 8 MM

## (undated) DEVICE — FOLEY CATH 2-WAY 16FR 5CC LATEX LUBRICATH

## (undated) DEVICE — SUT VLOC 90 3-0 6" CV-23 UNDYED

## (undated) DEVICE — DRAPE C ARM 41X140"

## (undated) DEVICE — FOLEY HOLDER STATLOCK 2 WAY ADULT

## (undated) DEVICE — SOL IRR POUR NS 0.9% 500ML

## (undated) DEVICE — LAP PAD 4 X 18"

## (undated) DEVICE — SUT BIOSYN 4-0 18" P-12

## (undated) DEVICE — TROCAR SURGIQUEST AIRSEAL 12MMX100MM

## (undated) DEVICE — LUBRICATING JELLY ONESHOT 1.25OZ

## (undated) DEVICE — SPECIMEN CONTAINER 100ML

## (undated) DEVICE — INSUFFLATION NDL COVIDIEN SURGINEEDLE VERESS 120MM

## (undated) DEVICE — XI ARM GRASPER TIP UP FENESTRATED

## (undated) DEVICE — PREP CHLORAPREP HI-LITE ORANGE 26ML

## (undated) DEVICE — XI OBTURATOR OPTICAL BLADELESS 8MM

## (undated) DEVICE — SUT VLOC 90 3-0 6" CV-23 VIOLET

## (undated) DEVICE — TUBING STRYKEFLOW II SUCTION / IRRIGATOR

## (undated) DEVICE — XI DRAPE ARM

## (undated) DEVICE — XI TIP COVER

## (undated) DEVICE — SUT CAPROSYN 4-0 30" P-12 UNDYED

## (undated) DEVICE — SUT POLYSORB 1 36" GS-21 UNDYED

## (undated) DEVICE — XI NEEDLE DRIVER LARGE

## (undated) DEVICE — TAPE SILK 3"

## (undated) DEVICE — XI VESSEL SEALER

## (undated) DEVICE — SUT VLOC 180 2-0 9" GS-21 GREEN

## (undated) DEVICE — POSITIONER FOAM HEAD CRADLE (PINK)

## (undated) DEVICE — SYR LUER LOK 10CC

## (undated) DEVICE — FOLEY TRAY 16FR 5CC LTX UMETER CLOSED

## (undated) DEVICE — XI ARM FORCEP PROGRASP 8MM

## (undated) DEVICE — SOL IRR POUR H2O 250ML

## (undated) DEVICE — XI ARM FORCEP FENESTRATED BIPOLAR 8MM

## (undated) DEVICE — SYR ASEPTO

## (undated) DEVICE — Device

## (undated) DEVICE — WARMING BLANKET UPPER ADULT

## (undated) DEVICE — DRSG MASTISOL

## (undated) DEVICE — XI ARM SCISSOR MONO CURVED